# Patient Record
Sex: MALE | Race: WHITE | Employment: OTHER
[De-identification: names, ages, dates, MRNs, and addresses within clinical notes are randomized per-mention and may not be internally consistent; named-entity substitution may affect disease eponyms.]

---

## 2017-01-09 DIAGNOSIS — M51.26 LUMBAGO DUE TO DISPLACEMENT OF INTERVERTEBRAL DISC: ICD-10-CM

## 2017-01-09 RX ORDER — GABAPENTIN 300 MG/1
300 CAPSULE ORAL 3 TIMES DAILY PRN
Qty: 90 CAPSULE | Refills: 2 | OUTPATIENT
Start: 2017-01-09 | End: 2018-01-09

## 2017-01-23 LAB — HBA1C MFR BLD: 6 %

## 2017-02-13 ENCOUNTER — CARE COORDINATION (OUTPATIENT)
Dept: CARE COORDINATION | Facility: CLINIC | Age: 52
End: 2017-02-13

## 2017-02-13 DIAGNOSIS — M51.26 LUMBAGO DUE TO DISPLACEMENT OF INTERVERTEBRAL DISC: ICD-10-CM

## 2017-02-13 RX ORDER — MULTIVIT WITH MINERALS/LUTEIN
1000 TABLET ORAL DAILY
COMMUNITY
End: 2017-10-23 | Stop reason: DRUGHIGH

## 2017-02-13 RX ORDER — METRONIDAZOLE 500 MG/1
500 TABLET ORAL 3 TIMES DAILY
COMMUNITY
Start: 2017-02-10 | End: 2017-02-17

## 2017-02-13 RX ORDER — CEFUROXIME AXETIL 500 MG/1
500 TABLET ORAL 2 TIMES DAILY
COMMUNITY
Start: 2017-02-10 | End: 2017-02-24

## 2017-02-14 ENCOUNTER — TELEPHONE (OUTPATIENT)
Dept: FAMILY MEDICINE CLINIC | Facility: CLINIC | Age: 52
End: 2017-02-14

## 2017-02-14 RX ORDER — METHADONE HYDROCHLORIDE 10 MG/1
10 TABLET ORAL 2 TIMES DAILY
Qty: 60 TABLET | Refills: 0 | Status: SHIPPED | OUTPATIENT
Start: 2017-02-14 | End: 2017-03-07 | Stop reason: ALTCHOICE

## 2017-02-15 ENCOUNTER — HOSPITAL ENCOUNTER (OUTPATIENT)
Age: 52
Setting detail: SPECIMEN
Discharge: HOME | End: 2017-02-15
Payer: MEDICARE

## 2017-02-15 DIAGNOSIS — M51.26 LUMBAGO DUE TO DISPLACEMENT OF INTERVERTEBRAL DISC: ICD-10-CM

## 2017-02-15 LAB
ABSOLUTE EOS #: 0.3 K/UL (ref 0–0.4)
ABSOLUTE LYMPH #: 2.2 K/UL (ref 1–4.8)
ABSOLUTE MONO #: 1.2 K/UL (ref 0.1–1.2)
ALBUMIN SERPL-MCNC: 3.8 G/DL (ref 3.5–5.2)
ALBUMIN/GLOBULIN RATIO: 1.1 (ref 1–2.5)
ALP BLD-CCNC: 172 U/L (ref 40–129)
ALT SERPL-CCNC: 37 U/L (ref 5–41)
ANION GAP SERPL CALCULATED.3IONS-SCNC: 19 MMOL/L (ref 9–17)
AST SERPL-CCNC: 36 U/L
BASOPHILS # BLD: 1 % (ref 0–2)
BASOPHILS ABSOLUTE: 0.1 K/UL (ref 0–0.2)
BILIRUB SERPL-MCNC: 1.01 MG/DL (ref 0.3–1.2)
BUN BLDV-MCNC: 24 MG/DL (ref 6–20)
BUN/CREAT BLD: ABNORMAL (ref 9–20)
CALCIUM SERPL-MCNC: 9.5 MG/DL (ref 8.6–10.4)
CHLORIDE BLD-SCNC: 91 MMOL/L (ref 98–107)
CO2: 24 MMOL/L (ref 20–31)
CREAT SERPL-MCNC: 0.82 MG/DL (ref 0.7–1.2)
DIFFERENTIAL TYPE: ABNORMAL
EOSINOPHILS RELATIVE PERCENT: 2 % (ref 1–4)
GFR AFRICAN AMERICAN: >60 ML/MIN
GFR NON-AFRICAN AMERICAN: >60 ML/MIN
GFR SERPL CREATININE-BSD FRML MDRD: ABNORMAL ML/MIN/{1.73_M2}
GFR SERPL CREATININE-BSD FRML MDRD: ABNORMAL ML/MIN/{1.73_M2}
GLUCOSE BLD-MCNC: 104 MG/DL (ref 70–99)
HCT VFR BLD CALC: 33.3 % (ref 41–53)
HEMOGLOBIN: 10.9 G/DL (ref 13.5–17.5)
LYMPHOCYTES # BLD: 15 % (ref 24–44)
MCH RBC QN AUTO: 29.3 PG (ref 26–34)
MCHC RBC AUTO-ENTMCNC: 32.8 G/DL (ref 31–37)
MCV RBC AUTO: 89.4 FL (ref 80–100)
MONOCYTES # BLD: 8 % (ref 2–11)
PDW BLD-RTO: 17.1 % (ref 12.5–15.4)
PLATELET # BLD: 1267 K/UL (ref 140–450)
PLATELET ESTIMATE: ABNORMAL
PMV BLD AUTO: 7.5 FL (ref 6–12)
POTASSIUM SERPL-SCNC: 5.9 MMOL/L (ref 3.7–5.3)
RBC # BLD: 3.72 M/UL (ref 4.5–5.9)
RBC # BLD: ABNORMAL 10*6/UL
SEG NEUTROPHILS: 74 % (ref 36–66)
SEGMENTED NEUTROPHILS ABSOLUTE COUNT: 10.8 K/UL (ref 1.8–7.7)
SODIUM BLD-SCNC: 134 MMOL/L (ref 135–144)
TOTAL PROTEIN: 7.4 G/DL (ref 6.4–8.3)
WBC # BLD: 14.6 K/UL (ref 3.5–11)
WBC # BLD: ABNORMAL 10*3/UL

## 2017-02-15 PROCEDURE — 36415 COLL VENOUS BLD VENIPUNCTURE: CPT

## 2017-02-15 PROCEDURE — 80053 COMPREHEN METABOLIC PANEL: CPT

## 2017-02-15 PROCEDURE — 85025 COMPLETE CBC W/AUTO DIFF WBC: CPT

## 2017-02-16 DIAGNOSIS — M51.26 LUMBAGO DUE TO DISPLACEMENT OF INTERVERTEBRAL DISC: ICD-10-CM

## 2017-02-16 RX ORDER — METHADONE HYDROCHLORIDE 10 MG/1
TABLET ORAL
Qty: 60 TABLET | Refills: 0 | OUTPATIENT
Start: 2017-02-16

## 2017-02-16 RX ORDER — METHADONE HYDROCHLORIDE 10 MG/1
10 TABLET ORAL 2 TIMES DAILY
Qty: 60 TABLET | Refills: 0 | Status: CANCELLED | OUTPATIENT
Start: 2017-02-16 | End: 2018-02-16

## 2017-02-18 DIAGNOSIS — E78.5 HYPERLIPIDEMIA, UNSPECIFIED HYPERLIPIDEMIA TYPE: ICD-10-CM

## 2017-02-20 RX ORDER — ATORVASTATIN CALCIUM 40 MG/1
40 TABLET, FILM COATED ORAL DAILY
Qty: 30 TABLET | Refills: 2 | Status: SHIPPED | OUTPATIENT
Start: 2017-02-20 | End: 2017-05-08 | Stop reason: SDUPTHER

## 2017-02-27 ENCOUNTER — CARE COORDINATION (OUTPATIENT)
Dept: CARE COORDINATION | Facility: CLINIC | Age: 52
End: 2017-02-27

## 2017-02-27 RX ORDER — CEFUROXIME AXETIL 500 MG/1
500 TABLET ORAL 2 TIMES DAILY
COMMUNITY
Start: 2017-02-25 | End: 2017-03-07 | Stop reason: ALTCHOICE

## 2017-03-06 ENCOUNTER — CARE COORDINATION (OUTPATIENT)
Dept: CARE COORDINATION | Facility: CLINIC | Age: 52
End: 2017-03-06

## 2017-03-06 DIAGNOSIS — M51.26 LUMBAGO DUE TO DISPLACEMENT OF INTERVERTEBRAL DISC: ICD-10-CM

## 2017-03-06 RX ORDER — METHADONE HYDROCHLORIDE 10 MG/1
10 TABLET ORAL 2 TIMES DAILY
Qty: 60 TABLET | Refills: 0 | Status: CANCELLED | OUTPATIENT
Start: 2017-03-06 | End: 2018-03-06

## 2017-03-07 ENCOUNTER — OFFICE VISIT (OUTPATIENT)
Dept: FAMILY MEDICINE CLINIC | Facility: CLINIC | Age: 52
End: 2017-03-07

## 2017-03-07 VITALS
TEMPERATURE: 97.1 F | DIASTOLIC BLOOD PRESSURE: 72 MMHG | RESPIRATION RATE: 17 BRPM | BODY MASS INDEX: 28.62 KG/M2 | HEIGHT: 76 IN | HEART RATE: 70 BPM | WEIGHT: 235 LBS | SYSTOLIC BLOOD PRESSURE: 100 MMHG

## 2017-03-07 DIAGNOSIS — F11.20 UNCOMPLICATED OPIOID DEPENDENCE (HCC): ICD-10-CM

## 2017-03-07 DIAGNOSIS — I10 ESSENTIAL HYPERTENSION: ICD-10-CM

## 2017-03-07 DIAGNOSIS — R55 NEAR SYNCOPE: ICD-10-CM

## 2017-03-07 DIAGNOSIS — J90 LOCULATED PLEURAL EFFUSION: Primary | ICD-10-CM

## 2017-03-07 DIAGNOSIS — J86.9 EMPYEMA (HCC): ICD-10-CM

## 2017-03-07 DIAGNOSIS — M51.26 LUMBAGO DUE TO DISPLACEMENT OF INTERVERTEBRAL DISC: ICD-10-CM

## 2017-03-07 PROCEDURE — 99495 TRANSJ CARE MGMT MOD F2F 14D: CPT | Performed by: NURSE PRACTITIONER

## 2017-03-07 RX ORDER — CEFUROXIME AXETIL 500 MG/1
500 TABLET ORAL 2 TIMES DAILY
COMMUNITY
End: 2017-07-10 | Stop reason: ALTCHOICE

## 2017-03-07 ASSESSMENT — ENCOUNTER SYMPTOMS
VOMITING: 0
DIARRHEA: 0
BACK PAIN: 1
EYE PAIN: 0
EYE DISCHARGE: 0
ABDOMINAL PAIN: 0
TROUBLE SWALLOWING: 0
CONSTIPATION: 1
WHEEZING: 0
SHORTNESS OF BREATH: 1
NAUSEA: 0
BLOOD IN STOOL: 0
RHINORRHEA: 1
COUGH: 1
SORE THROAT: 0

## 2017-03-10 RX ORDER — OXYCODONE AND ACETAMINOPHEN 7.5; 325 MG/1; MG/1
1 TABLET ORAL EVERY 4 HOURS PRN
Qty: 40 TABLET | Refills: 0 | Status: SHIPPED | OUTPATIENT
Start: 2017-03-10 | End: 2017-03-17 | Stop reason: SDUPTHER

## 2017-03-17 DIAGNOSIS — M51.26 LUMBAGO DUE TO DISPLACEMENT OF INTERVERTEBRAL DISC: ICD-10-CM

## 2017-03-17 RX ORDER — OXYCODONE AND ACETAMINOPHEN 7.5; 325 MG/1; MG/1
1 TABLET ORAL EVERY 6 HOURS PRN
Qty: 120 TABLET | Refills: 0 | Status: SHIPPED | OUTPATIENT
Start: 2017-03-17 | End: 2017-07-10 | Stop reason: ALTCHOICE

## 2017-03-20 RX ORDER — IBUPROFEN 800 MG/1
800 TABLET ORAL EVERY 8 HOURS PRN
Qty: 270 TABLET | Refills: 2 | Status: SHIPPED | OUTPATIENT
Start: 2017-03-20 | End: 2017-10-20 | Stop reason: SDUPTHER

## 2017-03-22 DIAGNOSIS — M51.26 LUMBAGO DUE TO DISPLACEMENT OF INTERVERTEBRAL DISC: ICD-10-CM

## 2017-03-23 RX ORDER — GABAPENTIN 300 MG/1
300 CAPSULE ORAL 3 TIMES DAILY PRN
Qty: 90 CAPSULE | Refills: 3 | Status: SHIPPED | OUTPATIENT
Start: 2017-03-23 | End: 2017-07-10 | Stop reason: DRUGHIGH

## 2017-03-24 ENCOUNTER — CARE COORDINATION (OUTPATIENT)
Dept: CARE COORDINATION | Age: 52
End: 2017-03-24

## 2017-04-07 ENCOUNTER — OFFICE VISIT (OUTPATIENT)
Dept: FAMILY MEDICINE CLINIC | Age: 52
End: 2017-04-07
Payer: MEDICARE

## 2017-04-07 VITALS
TEMPERATURE: 98 F | SYSTOLIC BLOOD PRESSURE: 110 MMHG | WEIGHT: 246 LBS | DIASTOLIC BLOOD PRESSURE: 84 MMHG | BODY MASS INDEX: 29.96 KG/M2 | HEIGHT: 76 IN | RESPIRATION RATE: 20 BRPM | HEART RATE: 72 BPM

## 2017-04-07 DIAGNOSIS — M51.26 LUMBAGO DUE TO DISPLACEMENT OF INTERVERTEBRAL DISC: Primary | ICD-10-CM

## 2017-04-07 DIAGNOSIS — M51.26 LUMBAGO DUE TO DISPLACEMENT OF INTERVERTEBRAL DISC: ICD-10-CM

## 2017-04-07 DIAGNOSIS — F11.20 UNCOMPLICATED OPIOID DEPENDENCE (HCC): ICD-10-CM

## 2017-04-07 DIAGNOSIS — F33.0 MAJOR DEPRESSIVE DISORDER, RECURRENT EPISODE, MILD (HCC): ICD-10-CM

## 2017-04-07 PROCEDURE — 99213 OFFICE O/P EST LOW 20 MIN: CPT | Performed by: PEDIATRICS

## 2017-04-07 PROCEDURE — 1036F TOBACCO NON-USER: CPT | Performed by: PEDIATRICS

## 2017-04-07 PROCEDURE — G8419 CALC BMI OUT NRM PARAM NOF/U: HCPCS | Performed by: PEDIATRICS

## 2017-04-07 PROCEDURE — G8427 DOCREV CUR MEDS BY ELIG CLIN: HCPCS | Performed by: PEDIATRICS

## 2017-04-07 PROCEDURE — 3017F COLORECTAL CA SCREEN DOC REV: CPT | Performed by: PEDIATRICS

## 2017-04-07 RX ORDER — LIDOCAINE 50 MG/G
PATCH TOPICAL
Qty: 90 PATCH | Refills: 0 | Status: SHIPPED | OUTPATIENT
Start: 2017-04-07 | End: 2017-05-07

## 2017-04-07 RX ORDER — GABAPENTIN 800 MG/1
800 TABLET ORAL DAILY
Qty: 90 TABLET | Refills: 3 | Status: SHIPPED | OUTPATIENT
Start: 2017-04-07 | End: 2017-06-14 | Stop reason: SDUPTHER

## 2017-04-07 ASSESSMENT — ENCOUNTER SYMPTOMS
BACK PAIN: 1
COUGH: 0

## 2017-04-11 ENCOUNTER — CARE COORDINATION (OUTPATIENT)
Dept: CARE COORDINATION | Age: 52
End: 2017-04-11

## 2017-04-26 RX ORDER — LIDOCAINE 50 MG/G
OINTMENT TOPICAL
Qty: 150 G | Refills: 2 | Status: SHIPPED | OUTPATIENT
Start: 2017-04-26 | End: 2018-04-26

## 2017-05-02 DIAGNOSIS — M51.26 LUMBAGO DUE TO DISPLACEMENT OF INTERVERTEBRAL DISC: ICD-10-CM

## 2017-05-06 ENCOUNTER — PATIENT MESSAGE (OUTPATIENT)
Dept: FAMILY MEDICINE CLINIC | Age: 52
End: 2017-05-06

## 2017-05-08 DIAGNOSIS — E78.5 HYPERLIPIDEMIA, UNSPECIFIED HYPERLIPIDEMIA TYPE: ICD-10-CM

## 2017-05-09 RX ORDER — ATORVASTATIN CALCIUM 40 MG/1
40 TABLET, FILM COATED ORAL DAILY
Qty: 30 TABLET | Refills: 2 | Status: SHIPPED | OUTPATIENT
Start: 2017-05-09 | End: 2017-07-18 | Stop reason: SDUPTHER

## 2017-05-16 ENCOUNTER — CARE COORDINATION (OUTPATIENT)
Dept: CARE COORDINATION | Age: 52
End: 2017-05-16

## 2017-05-19 DIAGNOSIS — I10 ESSENTIAL HYPERTENSION: ICD-10-CM

## 2017-05-19 RX ORDER — HYDROCHLOROTHIAZIDE 25 MG/1
25 TABLET ORAL DAILY
Qty: 30 TABLET | Refills: 5 | Status: SHIPPED | OUTPATIENT
Start: 2017-05-19 | End: 2017-11-15 | Stop reason: SDUPTHER

## 2017-05-30 ENCOUNTER — CARE COORDINATION (OUTPATIENT)
Dept: CARE COORDINATION | Age: 52
End: 2017-05-30

## 2017-06-06 DIAGNOSIS — M51.26 LUMBAGO DUE TO DISPLACEMENT OF INTERVERTEBRAL DISC: ICD-10-CM

## 2017-06-14 DIAGNOSIS — M51.26 LUMBAGO DUE TO DISPLACEMENT OF INTERVERTEBRAL DISC: ICD-10-CM

## 2017-06-14 RX ORDER — GABAPENTIN 800 MG/1
800 TABLET ORAL 3 TIMES DAILY
Qty: 90 TABLET | Refills: 3 | Status: SHIPPED | OUTPATIENT
Start: 2017-06-14 | End: 2017-10-20 | Stop reason: SDUPTHER

## 2017-06-18 DIAGNOSIS — F33.0 MAJOR DEPRESSIVE DISORDER, RECURRENT EPISODE, MILD (HCC): ICD-10-CM

## 2017-06-18 DIAGNOSIS — M51.26 LUMBAGO DUE TO DISPLACEMENT OF INTERVERTEBRAL DISC: ICD-10-CM

## 2017-06-19 RX ORDER — DULOXETIN HYDROCHLORIDE 60 MG/1
60 CAPSULE, DELAYED RELEASE ORAL DAILY
Qty: 30 CAPSULE | Refills: 5 | Status: SHIPPED | OUTPATIENT
Start: 2017-06-19 | End: 2017-11-17 | Stop reason: SDUPTHER

## 2017-06-20 ENCOUNTER — CARE COORDINATION (OUTPATIENT)
Dept: CARE COORDINATION | Age: 52
End: 2017-06-20

## 2017-07-05 DIAGNOSIS — M51.26 LUMBAGO DUE TO DISPLACEMENT OF INTERVERTEBRAL DISC: ICD-10-CM

## 2017-07-10 ENCOUNTER — OFFICE VISIT (OUTPATIENT)
Dept: FAMILY MEDICINE CLINIC | Age: 52
End: 2017-07-10
Payer: MEDICARE

## 2017-07-10 VITALS
DIASTOLIC BLOOD PRESSURE: 78 MMHG | HEART RATE: 88 BPM | TEMPERATURE: 98.1 F | SYSTOLIC BLOOD PRESSURE: 126 MMHG | BODY MASS INDEX: 32.28 KG/M2 | WEIGHT: 265.2 LBS | RESPIRATION RATE: 20 BRPM

## 2017-07-10 DIAGNOSIS — R73.01 IMPAIRED FASTING BLOOD SUGAR: ICD-10-CM

## 2017-07-10 DIAGNOSIS — E78.00 PURE HYPERCHOLESTEROLEMIA: ICD-10-CM

## 2017-07-10 DIAGNOSIS — I10 ESSENTIAL HYPERTENSION: ICD-10-CM

## 2017-07-10 DIAGNOSIS — Z12.11 COLON CANCER SCREENING: ICD-10-CM

## 2017-07-10 DIAGNOSIS — D64.9 ANEMIA, UNSPECIFIED TYPE: ICD-10-CM

## 2017-07-10 DIAGNOSIS — M51.26 LUMBAGO DUE TO DISPLACEMENT OF INTERVERTEBRAL DISC: Primary | ICD-10-CM

## 2017-07-10 PROCEDURE — 99214 OFFICE O/P EST MOD 30 MIN: CPT | Performed by: PEDIATRICS

## 2017-07-10 PROCEDURE — 3017F COLORECTAL CA SCREEN DOC REV: CPT | Performed by: PEDIATRICS

## 2017-07-10 PROCEDURE — G8427 DOCREV CUR MEDS BY ELIG CLIN: HCPCS | Performed by: PEDIATRICS

## 2017-07-10 PROCEDURE — G8417 CALC BMI ABV UP PARAM F/U: HCPCS | Performed by: PEDIATRICS

## 2017-07-10 PROCEDURE — 1036F TOBACCO NON-USER: CPT | Performed by: PEDIATRICS

## 2017-07-10 RX ORDER — MOMETASONE FUROATE 50 UG/1
2 SPRAY, METERED NASAL DAILY
Refills: 0 | COMMUNITY
Start: 2017-05-02 | End: 2017-10-23 | Stop reason: SDUPTHER

## 2017-07-10 ASSESSMENT — PATIENT HEALTH QUESTIONNAIRE - PHQ9
2. FEELING DOWN, DEPRESSED OR HOPELESS: 0
SUM OF ALL RESPONSES TO PHQ QUESTIONS 1-9: 1
SUM OF ALL RESPONSES TO PHQ9 QUESTIONS 1 & 2: 1
1. LITTLE INTEREST OR PLEASURE IN DOING THINGS: 1

## 2017-07-10 ASSESSMENT — ENCOUNTER SYMPTOMS
BOWEL INCONTINENCE: 0
BACK PAIN: 1
ABDOMINAL PAIN: 0

## 2017-07-18 DIAGNOSIS — E78.5 HYPERLIPIDEMIA, UNSPECIFIED HYPERLIPIDEMIA TYPE: ICD-10-CM

## 2017-07-18 DIAGNOSIS — I10 ESSENTIAL HYPERTENSION: ICD-10-CM

## 2017-07-18 RX ORDER — LISINOPRIL 20 MG/1
20 TABLET ORAL DAILY
Qty: 30 TABLET | Refills: 5 | Status: SHIPPED | OUTPATIENT
Start: 2017-07-18 | End: 2017-11-17 | Stop reason: SDUPTHER

## 2017-07-18 RX ORDER — ATORVASTATIN CALCIUM 40 MG/1
40 TABLET, FILM COATED ORAL DAILY
Qty: 30 TABLET | Refills: 3 | Status: SHIPPED | OUTPATIENT
Start: 2017-07-18 | End: 2017-07-26 | Stop reason: SDUPTHER

## 2017-07-20 ENCOUNTER — CARE COORDINATION (OUTPATIENT)
Dept: CARE COORDINATION | Age: 52
End: 2017-07-20

## 2017-07-20 DIAGNOSIS — M51.26 LUMBAGO DUE TO DISPLACEMENT OF INTERVERTEBRAL DISC: Primary | ICD-10-CM

## 2017-07-21 ENCOUNTER — CARE COORDINATION (OUTPATIENT)
Dept: CARE COORDINATION | Age: 52
End: 2017-07-21

## 2017-07-24 ENCOUNTER — HOSPITAL ENCOUNTER (OUTPATIENT)
Age: 52
Setting detail: SPECIMEN
Discharge: HOME | End: 2017-07-24
Payer: MEDICARE

## 2017-07-24 DIAGNOSIS — I10 ESSENTIAL HYPERTENSION: ICD-10-CM

## 2017-07-24 DIAGNOSIS — D64.9 ANEMIA, UNSPECIFIED TYPE: ICD-10-CM

## 2017-07-24 DIAGNOSIS — R73.01 IMPAIRED FASTING BLOOD SUGAR: ICD-10-CM

## 2017-07-24 DIAGNOSIS — E78.00 PURE HYPERCHOLESTEROLEMIA: ICD-10-CM

## 2017-07-24 LAB
ALBUMIN SERPL-MCNC: 4.7 G/DL (ref 3.5–5.2)
ALBUMIN/GLOBULIN RATIO: 1.7 (ref 1–2.5)
ALP BLD-CCNC: 123 U/L (ref 40–129)
ALT SERPL-CCNC: 15 U/L (ref 5–41)
ANION GAP SERPL CALCULATED.3IONS-SCNC: 18 MMOL/L (ref 9–17)
AST SERPL-CCNC: 17 U/L
BILIRUB SERPL-MCNC: 0.7 MG/DL (ref 0.3–1.2)
BUN BLDV-MCNC: 20 MG/DL (ref 6–20)
BUN/CREAT BLD: ABNORMAL (ref 9–20)
CALCIUM SERPL-MCNC: 9.7 MG/DL (ref 8.6–10.4)
CHLORIDE BLD-SCNC: 94 MMOL/L (ref 98–107)
CHOLESTEROL/HDL RATIO: 4
CHOLESTEROL: 227 MG/DL
CO2: 27 MMOL/L (ref 20–31)
CREAT SERPL-MCNC: 0.85 MG/DL (ref 0.7–1.2)
ESTIMATED AVERAGE GLUCOSE: 131 MG/DL
GFR AFRICAN AMERICAN: >60 ML/MIN
GFR NON-AFRICAN AMERICAN: >60 ML/MIN
GFR SERPL CREATININE-BSD FRML MDRD: ABNORMAL ML/MIN/{1.73_M2}
GFR SERPL CREATININE-BSD FRML MDRD: ABNORMAL ML/MIN/{1.73_M2}
GLUCOSE BLD-MCNC: 101 MG/DL (ref 70–99)
HBA1C MFR BLD: 6.2 % (ref 4–6)
HCT VFR BLD CALC: 39.1 % (ref 41–53)
HDLC SERPL-MCNC: 57 MG/DL
HEMOGLOBIN: 12.9 G/DL (ref 13.5–17.5)
LDL CHOLESTEROL DIRECT: 133 MG/DL
LDL CHOLESTEROL: ABNORMAL MG/DL (ref 0–130)
MCH RBC QN AUTO: 30.1 PG (ref 26–34)
MCHC RBC AUTO-ENTMCNC: 33.1 G/DL (ref 31–37)
MCV RBC AUTO: 91 FL (ref 80–100)
PDW BLD-RTO: 14.9 % (ref 12.5–15.4)
PLATELET # BLD: 357 K/UL (ref 140–450)
PMV BLD AUTO: 8.3 FL (ref 6–12)
POTASSIUM SERPL-SCNC: 4.9 MMOL/L (ref 3.7–5.3)
RBC # BLD: 4.3 M/UL (ref 4.5–5.9)
SODIUM BLD-SCNC: 139 MMOL/L (ref 135–144)
TOTAL PROTEIN: 7.4 G/DL (ref 6.4–8.3)
TRIGL SERPL-MCNC: 454 MG/DL
VLDLC SERPL CALC-MCNC: ABNORMAL MG/DL (ref 1–30)
WBC # BLD: 8.6 K/UL (ref 3.5–11)

## 2017-07-25 ENCOUNTER — CARE COORDINATION (OUTPATIENT)
Dept: CARE COORDINATION | Age: 52
End: 2017-07-25

## 2017-07-26 DIAGNOSIS — M51.26 LUMBAGO DUE TO DISPLACEMENT OF INTERVERTEBRAL DISC: ICD-10-CM

## 2017-07-26 DIAGNOSIS — E78.5 HYPERLIPIDEMIA, UNSPECIFIED HYPERLIPIDEMIA TYPE: ICD-10-CM

## 2017-07-26 RX ORDER — ATORVASTATIN CALCIUM 80 MG/1
80 TABLET, FILM COATED ORAL DAILY
Qty: 30 TABLET | Refills: 3 | Status: SHIPPED | OUTPATIENT
Start: 2017-07-26 | End: 2017-10-20 | Stop reason: SDUPTHER

## 2017-08-01 ENCOUNTER — CARE COORDINATION (OUTPATIENT)
Dept: CARE COORDINATION | Age: 52
End: 2017-08-01

## 2017-08-07 DIAGNOSIS — M51.26 LUMBAGO DUE TO DISPLACEMENT OF INTERVERTEBRAL DISC: ICD-10-CM

## 2017-08-15 ENCOUNTER — CARE COORDINATION (OUTPATIENT)
Dept: CARE COORDINATION | Age: 52
End: 2017-08-15

## 2017-09-05 DIAGNOSIS — M51.26 LUMBAGO DUE TO DISPLACEMENT OF INTERVERTEBRAL DISC: ICD-10-CM

## 2017-09-15 ENCOUNTER — CARE COORDINATION (OUTPATIENT)
Dept: CARE COORDINATION | Age: 52
End: 2017-09-15

## 2017-09-15 DIAGNOSIS — M51.26 LUMBAGO DUE TO DISPLACEMENT OF INTERVERTEBRAL DISC: ICD-10-CM

## 2017-09-15 RX ORDER — GABAPENTIN 800 MG/1
800 TABLET ORAL 3 TIMES DAILY
Qty: 90 TABLET | Refills: 3 | OUTPATIENT
Start: 2017-09-15 | End: 2018-09-15

## 2017-09-22 DIAGNOSIS — Z12.11 COLON CANCER SCREENING: ICD-10-CM

## 2017-09-22 LAB
CONTROL: PRESENT
HEMOCCULT STL QL: POSITIVE

## 2017-09-22 PROCEDURE — 82274 ASSAY TEST FOR BLOOD FECAL: CPT | Performed by: PEDIATRICS

## 2017-09-26 ENCOUNTER — TELEPHONE (OUTPATIENT)
Dept: FAMILY MEDICINE CLINIC | Age: 52
End: 2017-09-26

## 2017-09-26 DIAGNOSIS — R19.5 POSITIVE FIT (FECAL IMMUNOCHEMICAL TEST): Primary | ICD-10-CM

## 2017-09-29 ENCOUNTER — CARE COORDINATION (OUTPATIENT)
Dept: CARE COORDINATION | Age: 52
End: 2017-09-29

## 2017-10-06 DIAGNOSIS — M51.26 LUMBAGO DUE TO DISPLACEMENT OF INTERVERTEBRAL DISC: ICD-10-CM

## 2017-10-06 NOTE — TELEPHONE ENCOUNTER
LOV 7-10-17  LR 9-6-17  OARRS reviewed 9-6-17 by     Health Maintenance   Topic Date Due    Flu vaccine (1) 09/01/2017    Colon Cancer Screen FIT/FOBT  09/22/2018    Diabetes screen  07/24/2020    Lipid screen  07/24/2022    DTaP/Tdap/Td vaccine (2 - Td) 07/12/2026    Hepatitis C screen  Completed    HIV screen  Completed             (applicable per patient's age: Cancer Screenings, Depression Screening, Fall Risk Screening, Immunizations)    Hemoglobin A1C (%)   Date Value   07/24/2017 6.2 (H)   01/23/2017 6     LDL Cholesterol (mg/dL)   Date Value   07/24/2017          AST (U/L)   Date Value   07/24/2017 17     ALT (U/L)   Date Value   07/24/2017 15     BUN (mg/dL)   Date Value   07/24/2017 20      (goal A1C is < 7)   (goal LDL is <100) need 30-50% reduction from baseline     BP Readings from Last 3 Encounters:   07/10/17 126/78   04/07/17 110/84   03/07/17 100/72    (goal /80)      All Future Testing planned in [facility]:      Next Visit Date:  Future Appointments  Date Time Provider Donovan Floriani   10/9/2017 1:30 PM Iesha Saldana MD [facility]   10/23/2017 10:00 AM ARYAN Vines [facility]   11/14/2017 10:20 AM MD [facility]            Patient Active Problem List:     Hyperlipidemia     Hypertension     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

## 2017-10-06 NOTE — TELEPHONE ENCOUNTER
From: Rosangela Husain  To: Rambo Melchor MD  Sent: 10/6/2017 6:42 AM EDT  Subject: Medication Renewal Request    Original authorizing provider: MD Mohsen Santoros Mahendra would like a refill of the following medications:  HYDROmorphone (EXALGO) 12 MG T24A extended release tablet Rambo Melchor MD]    Preferred pharmacy: [address] [place] [address] [phone] - F [phone]    Comment:

## 2017-10-09 ENCOUNTER — OFFICE VISIT (OUTPATIENT)
Dept: GASTROENTEROLOGY | Age: 52
End: 2017-10-09
Payer: MEDICARE

## 2017-10-09 VITALS
WEIGHT: 276 LBS | TEMPERATURE: 97.3 F | HEART RATE: 94 BPM | RESPIRATION RATE: 14 BRPM | HEIGHT: 76 IN | SYSTOLIC BLOOD PRESSURE: 130 MMHG | BODY MASS INDEX: 33.61 KG/M2 | DIASTOLIC BLOOD PRESSURE: 81 MMHG | OXYGEN SATURATION: 99 %

## 2017-10-09 DIAGNOSIS — R13.10 DYSPHAGIA, UNSPECIFIED TYPE: ICD-10-CM

## 2017-10-09 DIAGNOSIS — R19.5 POSITIVE FIT (FECAL IMMUNOCHEMICAL TEST): Primary | ICD-10-CM

## 2017-10-09 DIAGNOSIS — D64.9 ANEMIA, UNSPECIFIED: ICD-10-CM

## 2017-10-09 PROCEDURE — 1036F TOBACCO NON-USER: CPT | Performed by: INTERNAL MEDICINE

## 2017-10-09 PROCEDURE — G8427 DOCREV CUR MEDS BY ELIG CLIN: HCPCS | Performed by: INTERNAL MEDICINE

## 2017-10-09 PROCEDURE — 3017F COLORECTAL CA SCREEN DOC REV: CPT | Performed by: INTERNAL MEDICINE

## 2017-10-09 PROCEDURE — G8482 FLU IMMUNIZE ORDER/ADMIN: HCPCS | Performed by: INTERNAL MEDICINE

## 2017-10-09 PROCEDURE — 99204 OFFICE O/P NEW MOD 45 MIN: CPT | Performed by: INTERNAL MEDICINE

## 2017-10-09 PROCEDURE — G8417 CALC BMI ABV UP PARAM F/U: HCPCS | Performed by: INTERNAL MEDICINE

## 2017-10-09 RX ORDER — SODIUM, POTASSIUM,MAG SULFATES 17.5-3.13G
SOLUTION, RECONSTITUTED, ORAL ORAL
Qty: 1 BOTTLE | Refills: 0 | Status: SHIPPED | OUTPATIENT
Start: 2017-10-09 | End: 2018-02-20 | Stop reason: ALTCHOICE

## 2017-10-09 ASSESSMENT — ENCOUNTER SYMPTOMS
ALLERGIC/IMMUNOLOGIC NEGATIVE: 1
VOMITING: 0
NAUSEA: 0
COUGH: 1
ABDOMINAL PAIN: 0
ANAL BLEEDING: 0
BLOOD IN STOOL: 1
DIARRHEA: 0
CONSTIPATION: 0
ABDOMINAL DISTENTION: 0
TROUBLE SWALLOWING: 1
RECTAL PAIN: 0
EYES NEGATIVE: 1

## 2017-10-09 NOTE — PROGRESS NOTES
Subjective:      Patient ID: Fausto Calero is a 46 y.o. male. HPI  Dr. Wilfrid Lopes MD has requested that I see Fausto Calero for a consult for   1. Positive FIT (fecal immunochemical test)    2. Dysphagia, unspecified type    3. Anemia, unspecified     . Patient is here for the first time. GI Problem (pos FIT test ) and Dysphagia (occasional food gets stuck )  Patient is here for the 1st time, referred because of positive fit, but also he said he says he was intubated because of some respiratory issues is been having problem with food getting stuck in his throat, he denied any choking denied any pharyngeal dysphagia type symptoms. But he think that the food around and gets stuck and he has to bring stuff to make go down. He did have reflux although the symptoms at this time are not flaring up on him. He denied any weight loss denied any bleeding. Is anemic and when his stool was checked and was positive for fit and  He's never had a colonoscopy  Denied any lower GI symptoms    Past Medical, Family, and Social History reviewed and does contribute to the patient presenting condition. patient\"s PMH/PSH,SH,PSYCH hx, MEDs, ALLERGIES, and ROS was all reviewed and updated ion the appropriate sections    Review of Systems   Constitutional: Positive for fatigue. HENT: Positive for trouble swallowing (occasional food gets stuck). Eyes: Negative. Respiratory: Positive for cough. Cardiovascular: Negative. Gastrointestinal: Positive for blood in stool (pos fit test). Negative for abdominal distention, abdominal pain, anal bleeding, constipation, diarrhea, nausea, rectal pain and vomiting. Endocrine: Negative. Genitourinary: Negative. Musculoskeletal: Negative. Skin: Negative. Allergic/Immunologic: Negative. Neurological: Positive for weakness (rt leg). Hematological: Negative. Psychiatric/Behavioral: Negative.         Objective:   Physical Exam   Constitutional: He is oriented to

## 2017-10-11 ENCOUNTER — HOSPITAL ENCOUNTER (OUTPATIENT)
Facility: CLINIC | Age: 52
Setting detail: OUTPATIENT SURGERY
Discharge: HOME | End: 2017-10-11
Attending: INTERNAL MEDICINE | Admitting: INTERNAL MEDICINE
Payer: MEDICARE

## 2017-10-11 ENCOUNTER — HOSPITAL ENCOUNTER (OUTPATIENT)
Age: 52
Setting detail: SPECIMEN
Discharge: HOME | End: 2017-10-11
Payer: MEDICARE

## 2017-10-11 ENCOUNTER — ANESTHESIA EVENT (OUTPATIENT)
Dept: OPERATING ROOM | Facility: CLINIC | Age: 52
End: 2017-10-11
Payer: MEDICARE

## 2017-10-11 ENCOUNTER — ANESTHESIA (OUTPATIENT)
Dept: OPERATING ROOM | Facility: CLINIC | Age: 52
End: 2017-10-11
Payer: MEDICARE

## 2017-10-11 VITALS
TEMPERATURE: 96.8 F | HEART RATE: 82 BPM | HEIGHT: 76 IN | BODY MASS INDEX: 32.88 KG/M2 | SYSTOLIC BLOOD PRESSURE: 110 MMHG | RESPIRATION RATE: 9 BRPM | WEIGHT: 270 LBS | OXYGEN SATURATION: 98 % | DIASTOLIC BLOOD PRESSURE: 75 MMHG

## 2017-10-11 VITALS — OXYGEN SATURATION: 97 % | DIASTOLIC BLOOD PRESSURE: 73 MMHG | SYSTOLIC BLOOD PRESSURE: 115 MMHG

## 2017-10-11 LAB
IRON SATURATION: 20 % (ref 20–55)
IRON: 81 UG/DL (ref 59–158)
TOTAL IRON BINDING CAPACITY: 400 UG/DL (ref 250–450)
UNSATURATED IRON BINDING CAPACITY: 319 UG/DL (ref 112–347)

## 2017-10-11 PROCEDURE — 43239 EGD BIOPSY SINGLE/MULTIPLE: CPT | Performed by: INTERNAL MEDICINE

## 2017-10-11 PROCEDURE — 2580000003 HC RX 258: Performed by: ANESTHESIOLOGY

## 2017-10-11 PROCEDURE — 7100000010 HC PHASE II RECOVERY - FIRST 15 MIN: Performed by: INTERNAL MEDICINE

## 2017-10-11 PROCEDURE — 3609010300 HC COLONOSCOPY W/BIOPSY SINGLE/MULTIPLE: Performed by: INTERNAL MEDICINE

## 2017-10-11 PROCEDURE — 7100000000 HC PACU RECOVERY - FIRST 15 MIN: Performed by: INTERNAL MEDICINE

## 2017-10-11 PROCEDURE — 7100000001 HC PACU RECOVERY - ADDTL 15 MIN: Performed by: INTERNAL MEDICINE

## 2017-10-11 PROCEDURE — 7100000011 HC PHASE II RECOVERY - ADDTL 15 MIN: Performed by: INTERNAL MEDICINE

## 2017-10-11 PROCEDURE — 88305 TISSUE EXAM BY PATHOLOGIST: CPT

## 2017-10-11 PROCEDURE — 3700000000 HC ANESTHESIA ATTENDED CARE: Performed by: INTERNAL MEDICINE

## 2017-10-11 PROCEDURE — 3609012400 HC EGD TRANSORAL BIOPSY SINGLE/MULTIPLE: Performed by: INTERNAL MEDICINE

## 2017-10-11 PROCEDURE — 2500000003 HC RX 250 WO HCPCS: Performed by: NURSE ANESTHETIST, CERTIFIED REGISTERED

## 2017-10-11 PROCEDURE — 45380 COLONOSCOPY AND BIOPSY: CPT | Performed by: INTERNAL MEDICINE

## 2017-10-11 PROCEDURE — 3700000001 HC ADD 15 MINUTES (ANESTHESIA): Performed by: INTERNAL MEDICINE

## 2017-10-11 PROCEDURE — 6360000002 HC RX W HCPCS: Performed by: NURSE ANESTHETIST, CERTIFIED REGISTERED

## 2017-10-11 RX ORDER — LIDOCAINE HYDROCHLORIDE 10 MG/ML
INJECTION, SOLUTION INFILTRATION; PERINEURAL PRN
Status: DISCONTINUED | OUTPATIENT
Start: 2017-10-11 | End: 2017-10-11 | Stop reason: SDUPTHER

## 2017-10-11 RX ORDER — PROPOFOL 10 MG/ML
INJECTION, EMULSION INTRAVENOUS PRN
Status: DISCONTINUED | OUTPATIENT
Start: 2017-10-11 | End: 2017-10-11 | Stop reason: SDUPTHER

## 2017-10-11 RX ORDER — SODIUM CHLORIDE, SODIUM LACTATE, POTASSIUM CHLORIDE, CALCIUM CHLORIDE 600; 310; 30; 20 MG/100ML; MG/100ML; MG/100ML; MG/100ML
INJECTION, SOLUTION INTRAVENOUS CONTINUOUS
Status: DISCONTINUED | OUTPATIENT
Start: 2017-10-11 | End: 2017-10-11 | Stop reason: HOSPADM

## 2017-10-11 RX ADMIN — PROPOFOL 50 MG: 10 INJECTION, EMULSION INTRAVENOUS at 12:26

## 2017-10-11 RX ADMIN — PROPOFOL 50 MG: 10 INJECTION, EMULSION INTRAVENOUS at 12:03

## 2017-10-11 RX ADMIN — PROPOFOL 50 MG: 10 INJECTION, EMULSION INTRAVENOUS at 12:23

## 2017-10-11 RX ADMIN — PROPOFOL 50 MG: 10 INJECTION, EMULSION INTRAVENOUS at 12:20

## 2017-10-11 RX ADMIN — PROPOFOL 50 MG: 10 INJECTION, EMULSION INTRAVENOUS at 12:01

## 2017-10-11 RX ADMIN — PROPOFOL 50 MG: 10 INJECTION, EMULSION INTRAVENOUS at 12:05

## 2017-10-11 RX ADMIN — SODIUM CHLORIDE, POTASSIUM CHLORIDE, SODIUM LACTATE AND CALCIUM CHLORIDE: 600; 310; 30; 20 INJECTION, SOLUTION INTRAVENOUS at 12:28

## 2017-10-11 RX ADMIN — SODIUM CHLORIDE, POTASSIUM CHLORIDE, SODIUM LACTATE AND CALCIUM CHLORIDE: 600; 310; 30; 20 INJECTION, SOLUTION INTRAVENOUS at 11:21

## 2017-10-11 RX ADMIN — PROPOFOL 50 MG: 10 INJECTION, EMULSION INTRAVENOUS at 12:14

## 2017-10-11 RX ADMIN — PROPOFOL 50 MG: 10 INJECTION, EMULSION INTRAVENOUS at 12:11

## 2017-10-11 RX ADMIN — LIDOCAINE HYDROCHLORIDE 50 MG: 10 INJECTION, SOLUTION INFILTRATION; PERINEURAL at 11:55

## 2017-10-11 RX ADMIN — PROPOFOL 50 MG: 10 INJECTION, EMULSION INTRAVENOUS at 12:09

## 2017-10-11 RX ADMIN — PROPOFOL 50 MG: 10 INJECTION, EMULSION INTRAVENOUS at 11:57

## 2017-10-11 RX ADMIN — PROPOFOL 50 MG: 10 INJECTION, EMULSION INTRAVENOUS at 11:59

## 2017-10-11 RX ADMIN — PROPOFOL 50 MG: 10 INJECTION, EMULSION INTRAVENOUS at 12:07

## 2017-10-11 RX ADMIN — PROPOFOL 100 MG: 10 INJECTION, EMULSION INTRAVENOUS at 11:55

## 2017-10-11 RX ADMIN — PROPOFOL 50 MG: 10 INJECTION, EMULSION INTRAVENOUS at 12:17

## 2017-10-11 ASSESSMENT — PAIN DESCRIPTION - DESCRIPTORS: DESCRIPTORS: ACHING

## 2017-10-11 ASSESSMENT — ENCOUNTER SYMPTOMS: SHORTNESS OF BREATH: 0

## 2017-10-11 ASSESSMENT — LIFESTYLE VARIABLES: SMOKING_STATUS: 0

## 2017-10-11 ASSESSMENT — PAIN - FUNCTIONAL ASSESSMENT: PAIN_FUNCTIONAL_ASSESSMENT: 0-10

## 2017-10-11 NOTE — OP NOTE
PROCEDURE NOTE    DATE OF PROCEDURE: 10/11/2017    SURGEON: Gonzalo Green MD  Facility : [facility]  ASSISTANT: None  Anesthesia: MAC  PREOPERATIVE DIAGNOSIS:   anmeia    POSTOPERATIVE DIAGNOSIS: as described below    OPERATION: Total colonoscopy     ANESTHESIA: Moderate Sedation    ESTIMATED BLOOD LOSS: less than 50     COMPLICATIONS: None. SPECIMENS:  Was Obtained:   Tiny sessile polyp 6 mm, cold bx rt colon . HISTORY: The patient is a 46y.o. year old male with history of above preop diagnosis. I recommended colonoscopy with possible biopsy or polypectomy and I explained the risk, benefits, expected outcome, and alternatives to the procedure. Risks included but are not limited to bleeding, infection, respiratory distress, hypotension, and perforation of the colon and possibility of missing a lesion. The patient understands and is in agreement. PROCEDURE: The patient was given IV conscious sedation. The patient's SPO2 remained above 90% throughout the procedure. The colonoscope was inserted per rectum and advanced under direct vision to the cecum without difficulty. The prep was good. Findings:  Terminal ileum: normal    Cecum/Ascending colon: abnormal: Tiny sessile polyp 6 mm, cold bx rt colon . few diverticula     Transverse colon: abnormal: few diverticula     Descending/Sigmoid colon: abnormal: few diverticula     Rectum/Anus: examined in normal and retroflexed positions and was abnormal:  Internal hemorrhoids     Withdrawal Time was (minutes): 10    The colon was decompressed and the scope was removed. The patient tolerated the procedure well. Recommendations/Plan:   1. Lifestyle and dietary modifications as discussed  2. F/U Biopsies  3. F/U In OfficeYes  4. Discussed with the family  5.  Repeat colonoscopy [id number]    Electronically signed by Gonzalo Green MD  on 10/11/2017 at 12:34 PM

## 2017-10-11 NOTE — H&P
Procedure History and Physical    Pre-Procedural Diagnosis:      1. Positive FIT (fecal immunochemical test)    2. Dysphagia, unspecified type    3. Anemia, unspecified     . Indications:  same    Procedure Planned: endoscopy and colonoscopy     History Obtained From:  patient    HISTORY OF PRESENT ILLNESS:       The patient is a 46 y.o. male who presents for the above procedure. Past Medical History:    Past Medical History:   Diagnosis Date    Arthritis     Dizziness     Hyperlipidemia     Hypertension     Lumbar disc herniation with radiculopathy     Nervously anxious     Osteoarthrosis of hip     Umbilical hernia        Past Surgical History:    Past Surgical History:   Procedure Laterality Date    CERVICAL FUSION  2011    LUMBAR FUSION  2009    LUMBAR LAMINECTOMY  2001    TOTAL HIP ARTHROPLASTY  2006    right    TOTAL HIP ARTHROPLASTY  2005    left       Medications:  No current facility-administered medications for this encounter. Allergies:   No Known Allergies            Problems with Sedation/Anesthesia in the past? no    REVIEW OF SYSTEMS:  12 point review of systems negative other than mentioned above.       PHYSICAL EXAM:    Vitals:  BP (!) 164/91   Pulse 100   Temp 97.7 °F (36.5 °C)   Resp 16   Ht 6' 4\" (1.93 m)   Wt 270 lb (122.5 kg)   SpO2 95%   BMI 32.87 kg/m²     Focused Exam related to procedure:    General appearance: NAD, conversant   Eyes: anicteric sclerae, moist conjunctivae; no lid-lag; PERRLA   Lungs: CTA, with normal respiratory effort and no intercostal retractions   CV: RRR, no MRGs   Abdomen: Soft, non-tender; no masses or HSM   Skin: Normal temperature, turgor and texture; no rash, ulcers or subcutaneous nodules     DATA:  CBC:   Lab Results   Component Value Date    WBC 8.6 07/24/2017    HGB 12.9 (L) 07/24/2017    HCT 39.1 (L) 07/24/2017    MCV 91.0 07/24/2017     07/24/2017     BUN/Cr:   Lab Results   Component Value Date    BUN 20 07/24/2017   ,   Lab Results   Component Value Date    CREATININE 0.85 07/24/2017     Potassium:   Lab Results   Component Value Date    K 4.9 07/24/2017     PT/INR: No results found for: INR, PROTIME    ASSESSMENT AND PLAN:          1. Patient is a 46 y.o. male with above specified procedure planned. Expected Sedation/Anesthesia Type: MAC    2. ASA ([address] Anesthesiology) Anesthesia Status: Class 1 - A normal healthy patient    3. ASA Physical Status Classification: ASA 1 - Normal health patient    4. Procedure options, risks and benefits reviewed with Patient. Patient expresses understanding.     5.  Consent has been signed:  Yes    Saige Farson

## 2017-10-11 NOTE — ANESTHESIA PRE PROCEDURE
Department of Anesthesiology  Preprocedure Note       Name:  Melly Perez   Age:  46 y.o.  :  1965                                          MRN:  [id number]         Date:  10/11/2017      Surgeon: Mihai Russell):  Cameron Lal MD    Procedure: Procedure(s):  EGD BIOPSY  COLONOSCOPY WITH BIOPSY    Medications prior to admission:   Prior to Admission medications    Medication Sig Start Date End Date Taking? Authorizing Provider   Na Sulfate-K Sulfate-Mg Sulf 17.5-3.13-1.6 GM/180ML SOLN Use as directed in your patient instructions. 10/9/17   Nely Birmingham MD   HYDROmorphone (EXALGO) 12 MG T24A extended release tablet Take 1 tablet by mouth daily . 10/6/17 10/6/18  Brett Mtz MD   metFORMIN (GLUCOPHAGE) 500 MG tablet Take 1 tablet by mouth 2 times daily (with meals) 17  Brett Mtz MD   atorvastatin (LIPITOR) 80 MG tablet Take 1 tablet by mouth daily 17  Brett Mtz MD   lisinopril (PRINIVIL;ZESTRIL) 20 MG tablet Take 1 tablet by mouth daily 17  Brett Mtz MD   mometasone (NASONEX) 50 MCG/ACT nasal spray 2 sprays by Nasal route daily 5/2/17 7/10/18  Historical Provider, MD   DULoxetine (CYMBALTA) 60 MG extended release capsule Take 1 capsule by mouth daily 17  Brett Mtz MD   gabapentin (NEURONTIN) 800 MG tablet Take 1 tablet by mouth 3 times daily 17  Brett Mtz MD   hydrochlorothiazide (HYDRODIURIL) 25 MG tablet Take 1 tablet by mouth daily 17  Brett Mtz MD   lidocaine (XYLOCAINE) 5 % ointment Apply topically daily as needed. 17  Jostin Rey CNP   ibuprofen (ADVIL;MOTRIN) 800 MG tablet Take 1 tablet by mouth every 8 hours as needed for Pain 3/20/17 3/20/18  Jostin Rey CNP   vitamin E 1000 UNITS capsule Take 1,000 Units by mouth daily    Historical Provider, MD       Current medications:    No current facility-administered medications for this encounter.         Allergies:  No Known Allergies    Problem List:    Patient Active Problem List   Diagnosis Code    Hyperlipidemia E78.5    Hypertension I10    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 M51.26    Major depressive disorder, recurrent episode, mild (HCC) F33.0    Impaired fasting blood sugar R73.01    Opioid dependence (HCC) M05.73    Umbilical hernia without obstruction and without gangrene K42.9    Positive FIT (fecal immunochemical test) R19.5    Dysphagia R13.10       Past Medical History:        Diagnosis Date    Arthritis     Dizziness     Hyperlipidemia     Hypertension     Lumbar disc herniation with radiculopathy     Nervously anxious     Osteoarthrosis of hip     Umbilical hernia        Past Surgical History:        Procedure Laterality Date    CERVICAL FUSION  2011    LUMBAR FUSION  2009    LUMBAR LAMINECTOMY  2001    TOTAL HIP ARTHROPLASTY  2006    right    TOTAL HIP ARTHROPLASTY  2005    left       Social History:    Social History   Substance Use Topics    Smoking status: Never Smoker    Smokeless tobacco: Never Used    Alcohol use Yes      Comment: social                                Counseling given: Not Answered      Vital Signs (Current): There were no vitals filed for this visit.                                            BP Readings from Last 3 Encounters:   10/09/17 130/81   07/10/17 126/78   04/07/17 110/84       NPO Status:                                                                                 BMI:   Wt Readings from Last 3 Encounters:   10/09/17 276 lb (125.2 kg)   07/10/17 265 lb 3.2 oz (120.3 kg)   04/07/17 246 lb (111.6 kg)     There is no height or weight on file to calculate BMI.    CBC:   Lab Results   Component Value Date    WBC 8.6 07/24/2017    RBC 4.30 07/24/2017    HGB 12.9 07/24/2017    HCT 39.1 07/24/2017    MCV 91.0 07/24/2017    RDW 14.9 07/24/2017     07/24/2017       CMP:   Lab Results   Component Value Date     07/24/2017    K 4.9 07/24/2017 CL 94 07/24/2017    CO2 27 07/24/2017    BUN 20 07/24/2017    CREATININE 0.85 07/24/2017    GFRAA >60 07/24/2017    LABGLOM >60 07/24/2017    GLUCOSE 101 07/24/2017    GLUCOSE 93 12/20/2011    PROT 7.4 07/24/2017    CALCIUM 9.7 07/24/2017    BILITOT 0.70 07/24/2017    ALKPHOS 123 07/24/2017    AST 17 07/24/2017    ALT 15 07/24/2017       POC Tests: No results for input(s): POCGLU, POCNA, POCK, POCCL, POCBUN, POCHEMO, POCHCT in the last 72 hours. Coags: No results found for: PROTIME, INR, APTT    HCG (If Applicable): No results found for: PREGTESTUR, PREGSERUM, HCG, HCGQUANT     ABGs: No results found for: PHART, PO2ART, RIX9IIU, NMK6OIQ, BEART, D9DRXYEX     Type & Screen (If Applicable):  No results found for: LABABO, [address]    Anesthesia Evaluation  Patient summary reviewed and Nursing notes reviewed no history of anesthetic complications:   Airway: Mallampati: II        Dental: normal exam         Pulmonary: breath sounds clear to auscultation  (+) pneumonia: resolved,      (-) COPD, asthma, shortness of breath, recent URI and sleep apnea                           Cardiovascular:  Exercise tolerance: good (>4 METS),   (+) hypertension:,     (-) pacemaker, valvular problems/murmurs, past MI, CAD, CABG/stent, dysrhythmias,  angina,  CHF, orthopnea, PND and  RODRIGUEZ      Rhythm: regular  Rate: normal           Beta Blocker:  Not on Beta Blocker         Neuro/Psych:   (+) neuromuscular disease:, psychiatric history:   (-) seizures, TIA, CVA and headaches           GI/Hepatic/Renal:   (+) bowel prep     (-) hiatal hernia, GERD, PUD, hepatitis and liver disease       Endo/Other:    (+) : arthritis:. (-) hypothyroidism, hyperthyroidism, blood dyscrasia, no Type II DM, no Type I DM               Abdominal:         (-) obese     Vascular:                                      Anesthesia Plan      general and MAC     ASA 2       Induction: intravenous. Anesthetic plan and risks discussed with patient.       Plan discussed with CRNA.                   Vinod Garcia MD   10/11/2017

## 2017-10-12 ENCOUNTER — HOSPITAL ENCOUNTER (OUTPATIENT)
Age: 52
Setting detail: SPECIMEN
Discharge: HOME | End: 2017-10-12
Payer: MEDICARE

## 2017-10-12 DIAGNOSIS — D64.9 ANEMIA, UNSPECIFIED: ICD-10-CM

## 2017-10-12 LAB
FOLATE: >20 NG/ML
VITAMIN B-12: 386 PG/ML (ref 211–946)

## 2017-10-13 LAB — SURGICAL PATHOLOGY REPORT: NORMAL

## 2017-10-20 ENCOUNTER — CARE COORDINATION (OUTPATIENT)
Dept: CARE COORDINATION | Age: 52
End: 2017-10-20

## 2017-10-20 DIAGNOSIS — M51.26 LUMBAGO DUE TO DISPLACEMENT OF INTERVERTEBRAL DISC: ICD-10-CM

## 2017-10-20 DIAGNOSIS — E78.5 HYPERLIPIDEMIA, UNSPECIFIED HYPERLIPIDEMIA TYPE: ICD-10-CM

## 2017-10-20 DIAGNOSIS — I10 ESSENTIAL HYPERTENSION: Primary | ICD-10-CM

## 2017-10-22 RX ORDER — IBUPROFEN 800 MG/1
800 TABLET ORAL EVERY 8 HOURS PRN
Qty: 270 TABLET | Refills: 2 | Status: SHIPPED | OUTPATIENT
Start: 2017-10-22 | End: 2018-02-20

## 2017-10-22 RX ORDER — GABAPENTIN 800 MG/1
800 TABLET ORAL 3 TIMES DAILY
Qty: 90 TABLET | Refills: 3 | Status: SHIPPED | OUTPATIENT
Start: 2017-10-22 | End: 2018-03-22 | Stop reason: SDUPTHER

## 2017-10-22 RX ORDER — ATORVASTATIN CALCIUM 80 MG/1
80 TABLET, FILM COATED ORAL DAILY
Qty: 30 TABLET | Refills: 3 | Status: SHIPPED | OUTPATIENT
Start: 2017-10-22 | End: 2018-03-28 | Stop reason: SDUPTHER

## 2017-10-23 ENCOUNTER — OFFICE VISIT (OUTPATIENT)
Dept: FAMILY MEDICINE CLINIC | Age: 52
End: 2017-10-23
Payer: MEDICARE

## 2017-10-23 VITALS
TEMPERATURE: 97.1 F | BODY MASS INDEX: 33.78 KG/M2 | DIASTOLIC BLOOD PRESSURE: 80 MMHG | HEIGHT: 76 IN | HEART RATE: 80 BPM | RESPIRATION RATE: 16 BRPM | SYSTOLIC BLOOD PRESSURE: 122 MMHG | WEIGHT: 277.4 LBS

## 2017-10-23 DIAGNOSIS — M51.26 LUMBAGO DUE TO DISPLACEMENT OF INTERVERTEBRAL DISC: Primary | ICD-10-CM

## 2017-10-23 DIAGNOSIS — K42.9 UMBILICAL HERNIA WITHOUT OBSTRUCTION AND WITHOUT GANGRENE: ICD-10-CM

## 2017-10-23 DIAGNOSIS — M54.16 LUMBAR RADICULOPATHY: ICD-10-CM

## 2017-10-23 DIAGNOSIS — I10 ESSENTIAL HYPERTENSION: ICD-10-CM

## 2017-10-23 DIAGNOSIS — J30.1 ACUTE SEASONAL ALLERGIC RHINITIS DUE TO POLLEN: ICD-10-CM

## 2017-10-23 PROCEDURE — 3017F COLORECTAL CA SCREEN DOC REV: CPT | Performed by: NURSE PRACTITIONER

## 2017-10-23 PROCEDURE — 1036F TOBACCO NON-USER: CPT | Performed by: NURSE PRACTITIONER

## 2017-10-23 PROCEDURE — 99214 OFFICE O/P EST MOD 30 MIN: CPT | Performed by: NURSE PRACTITIONER

## 2017-10-23 PROCEDURE — G8417 CALC BMI ABV UP PARAM F/U: HCPCS | Performed by: NURSE PRACTITIONER

## 2017-10-23 PROCEDURE — G8427 DOCREV CUR MEDS BY ELIG CLIN: HCPCS | Performed by: NURSE PRACTITIONER

## 2017-10-23 PROCEDURE — G8482 FLU IMMUNIZE ORDER/ADMIN: HCPCS | Performed by: NURSE PRACTITIONER

## 2017-10-23 RX ORDER — VITAMIN E 268 MG
400 CAPSULE ORAL 2 TIMES DAILY
COMMUNITY
End: 2018-02-20 | Stop reason: ALTCHOICE

## 2017-10-23 RX ORDER — MOMETASONE FUROATE 50 UG/1
2 SPRAY, METERED NASAL DAILY
Qty: 1 INHALER | Refills: 2 | Status: SHIPPED | OUTPATIENT
Start: 2017-10-23 | End: 2018-04-17 | Stop reason: SDUPTHER

## 2017-10-23 ASSESSMENT — ENCOUNTER SYMPTOMS
COUGH: 1
EYE DISCHARGE: 1
BACK PAIN: 1
DIARRHEA: 0
EYE REDNESS: 1
SORE THROAT: 0
TROUBLE SWALLOWING: 0
RHINORRHEA: 1
CHEST TIGHTNESS: 0
CONSTIPATION: 1
EYE ITCHING: 1
WHEEZING: 0
VOMITING: 0
SHORTNESS OF BREATH: 0
ABDOMINAL PAIN: 0
NAUSEA: 0

## 2017-10-23 NOTE — PROGRESS NOTES
Subjective:      Patient ID: Juan Manuel Whitley is a 46 y.o. male. Visit Information    Have you changed or started any medications since your last visit including any over-the-counter medicines, vitamins, or herbal medicines? no   Are you having any side effects from any of your medications? -  no  Have you stopped taking any of your medications? Is so, why? -  no    Have you seen any other physician or provider since your last visit? Yes - Records Requested  Have you had any other diagnostic tests since your last visit? No  Have you been seen in the emergency room and/or had an admission to a hospital since we last saw you? No  Have you had your routine dental cleaning in the past 6 months? no    Have you activated your [employer] account? If not, what are your barriers? Yes     Patient Care Team:  Elias Ventura MD as PCP - General (Pediatrics)  Elias Ventura MD as PCP - S Attributed Provider  Canton MD Puma as Consulting Physician (Infectious Diseases)  Gerson Garcia MD as Consulting Physician (Pulmonary Disease)  Earl Reed as Consulting Physician (Anesthesiology)  Rosalia Johnson RN as Care Coordinator    Medical History Review  Past Medical, Family, and Social History reviewed and does contribute to the patient presenting condition    Health Maintenance   Topic Date Due    Diabetes screen  07/24/2020    Lipid screen  07/24/2022    Colon cancer screen colonoscopy  10/11/2022    DTaP/Tdap/Td vaccine (2 - Td) 07/12/2026    Flu vaccine  Completed    Hepatitis C screen  Completed    HIV screen  Completed       Patient here today for Pre-Op Clearance. Patient is scheduled for his Pre-Op testing on 11/2/17 and his surgery on 11/16/17. Patient will be having [facility] by Dr. Yuly Mabry. He will be having his fusion extended from L3-S1. Electronically signed by Irma Winn on 10/23/2017 at 10:02 AM      Patient presents in office today for pre-op medical clearance.  He is going oropharynx is clear and moist and mucous membranes are normal. No posterior oropharyngeal edema or posterior oropharyngeal erythema. Eyes: Conjunctivae are normal. Right eye exhibits no discharge. Left eye exhibits no discharge. Neck: Neck supple. No JVD present. Cardiovascular: Normal rate, regular rhythm and normal heart sounds. Pulses:       Carotid pulses are 2+ on the right side, and 2+ on the left side. Radial pulses are 2+ on the right side, and 2+ on the left side. Pulmonary/Chest: Effort normal and breath sounds normal. No respiratory distress. He has no wheezes. He has no rales. Abdominal: Soft. Bowel sounds are normal. He exhibits no distension. There is no tenderness. There is no guarding. A hernia (umbilical, easily reducible) is present. Musculoskeletal: He exhibits no edema. Lumbar back: He exhibits decreased range of motion and pain. He exhibits no bony tenderness and no swelling. No red or swollen joints   Lymphadenopathy:     He has no cervical adenopathy. Neurological: He is alert and oriented to person, place, and time. Skin: Skin is warm. No rash noted. He is diaphoretic. Psychiatric: He has a normal mood and affect. His behavior is normal.   Nursing note and vitals reviewed. Assessment:      1. 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     2. Lumbar radiculopathy     3. Acute seasonal allergic rhinitis due to pollen  mometasone (NASONEX) 50 MCG/ACT nasal spray   4. Essential hypertension     5. Umbilical hernia without obstruction and without gangrene             Plan:      Continue medications as prescribed  Advised No NSAIDs at least 7-10 days prior to surgery  Will complete scheduled pre-op testing on 11/2 at [facility]. Will await results. Advised to restart Nasonex and recommend OTC antihistamine as needed for allergies  Umbilical hernia stable. Will monitor. Follow up with neurosurgery as planned.    Call with concerns      Controlled Substances Monitoring:     Attestation: The Prescription Monitoring Report for this patient was reviewed today. Marbella Blas CNP)  Documentation: No signs of potential drug abuse or diversion identified. Marbella Blas CNP)    1. Martín Hawkins received counseling on the following healthy behaviors: nutrition, exercise and medication adherence  2. Reviewed prior labs and health maintenance  3. Continue current medications, diet and exercise. 4.  Discussed use, benefit, and side effects of prescribed medications. Barriers to medication compliance addressed. All patient questions answered. Pt voiced understanding. 5.  Patient given educational materials - see patient instructions  6. Was a self-tracking handout given in paper form or via My[employer]? No  If yes, see orders or list here.       Completed Refills   Requested Prescriptions     Signed Prescriptions Disp Refills    mometasone (NASONEX) 50 MCG/ACT nasal spray 1 Inhaler 2     Si sprays by Nasal route daily

## 2017-10-24 NOTE — TELEPHONE ENCOUNTER
Patient contacted and notified of pending lab orders.  Patient states he will complete in the lab in this office

## 2017-11-03 ENCOUNTER — CARE COORDINATION (OUTPATIENT)
Dept: CARE COORDINATION | Age: 52
End: 2017-11-03

## 2017-11-03 DIAGNOSIS — D72.829 LEUKOCYTOSIS, UNSPECIFIED TYPE: Primary | ICD-10-CM

## 2017-11-03 DIAGNOSIS — M51.26 LUMBAGO DUE TO DISPLACEMENT OF INTERVERTEBRAL DISC: ICD-10-CM

## 2017-11-03 NOTE — CARE COORDINATION
tablet by mouth every 8 hours as needed for Pain 10/22/17   Brady King MD   gabapentin (NEURONTIN) 800 MG tablet Take 1 tablet by mouth 3 times daily 10/22/17 10/22/18  Brady King MD   atorvastatin (LIPITOR) 80 MG tablet Take 1 tablet by mouth daily 10/22/17 10/22/18  Brady King MD   Na Sulfate-K Sulfate-Mg Sulf 17.5-3.13-1.6 GM/180ML SOLN Use as directed in your patient instructions. 10/9/17   Nely Birmingham MD   HYDROmorphone (EXALGO) 12 MG T24A extended release tablet Take 1 tablet by mouth daily . 10/6/17 10/6/18  Brady King MD   metFORMIN (GLUCOPHAGE) 500 MG tablet Take 1 tablet by mouth 2 times daily (with meals) 9/17/17 9/17/18  Brady King MD   lisinopril (PRINIVIL;ZESTRIL) 20 MG tablet Take 1 tablet by mouth daily 7/18/17 7/18/18  Brady King MD   DULoxetine (CYMBALTA) 60 MG extended release capsule Take 1 capsule by mouth daily 6/19/17 6/19/18  Brady King MD   hydrochlorothiazide (HYDRODIURIL) 25 MG tablet Take 1 tablet by mouth daily 5/19/17 5/19/18  Brady King MD   lidocaine (XYLOCAINE) 5 % ointment Apply topically daily as needed.  4/26/17 4/26/18  Tonya Wilson CNP       Future Appointments  Date Time Provider Donovan Peraza   11/6/2017 1:30 PM Carly Pierson MD [facility]   11/14/2017 10:20 AM MD Marcus Vega

## 2017-11-03 NOTE — TELEPHONE ENCOUNTER
LOV 10-23-17  LRF 10-6-17  OARRS last reviewed 10-23-17 by [facility] AT [facility]  Health Maintenance   Topic Date Due    Diabetes screen  07/24/2020    Lipid screen  07/24/2022    Colon cancer screen colonoscopy  10/11/2022    DTaP/Tdap/Td vaccine (2 - Td) 07/12/2026    Flu vaccine  Completed    Hepatitis C screen  Completed    HIV screen  Completed             (applicable per patient's age: Cancer Screenings, Depression Screening, Fall Risk Screening, Immunizations)    Hemoglobin A1C (%)   Date Value   07/24/2017 6.2 (H)   01/23/2017 6     LDL Cholesterol (mg/dL)   Date Value   07/24/2017          AST (U/L)   Date Value   07/24/2017 17     ALT (U/L)   Date Value   07/24/2017 15     BUN (mg/dL)   Date Value   07/24/2017 20      (goal A1C is < 7)   (goal LDL is <100) need 30-50% reduction from baseline     BP Readings from Last 3 Encounters:   10/23/17 122/80   10/11/17 110/75   10/11/17 115/73    (goal /80)      All Future Testing planned in [facility]:  Lab Frequency Next Occurrence   Iron and TIBC Once 01/09/2018   EGD Once 10/09/2017   COLONOSCOPY W/ OR W/O BIOPSY Once 10/09/2017   Lipid, Fasting Once 10/27/2017   Comprehensive Metabolic Panel Once 35/83/1326       Next Visit Date:  Future Appointments  Date Time Provider Donovan Peraza   11/6/2017 1:30 PM Gonzalo Green MD [facility]   11/14/2017 10:20 AM [facility]            Patient Active Problem List:     Hyperlipidemia     Hypertension     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     Major depressive disorder, recurrent episode, mild (HCC)     Impaired fasting blood sugar     Opioid dependence ([address]     Umbilical hernia without obstruction and without gangrene     Positive FIT (fecal immunochemical test)     Dysphagia

## 2017-11-03 NOTE — TELEPHONE ENCOUNTER
At his last visit with Scooter Angel, he stated he was not sure medication was even helping any with pain. Is it work continuing the narcotic pain med since he feels it is not helping?

## 2017-11-03 NOTE — TELEPHONE ENCOUNTER
From: Jaison Dalton  Sent: 11/3/2017 9:00 AM EDT  Subject: Medication Renewal Request    Tori Bateman Aas would like a refill of the following medications:  HYDROmorphone (EXALGO) 12 MG T24A extended release tablet Abrahan Art MD]    Preferred pharmacy: [address] [place] [address] [phone] - F [phone]    Comment:

## 2017-11-03 NOTE — TELEPHONE ENCOUNTER
Patient states Rx is moderately effective and has substantially less side effects than other narcotics that he has tried previously.

## 2017-11-06 ENCOUNTER — OFFICE VISIT (OUTPATIENT)
Dept: GASTROENTEROLOGY | Age: 52
End: 2017-11-06
Payer: MEDICARE

## 2017-11-06 VITALS
BODY MASS INDEX: 34.22 KG/M2 | OXYGEN SATURATION: 99 % | HEIGHT: 76 IN | DIASTOLIC BLOOD PRESSURE: 90 MMHG | WEIGHT: 281 LBS | SYSTOLIC BLOOD PRESSURE: 156 MMHG | TEMPERATURE: 97.3 F | HEART RATE: 86 BPM | RESPIRATION RATE: 14 BRPM

## 2017-11-06 DIAGNOSIS — K57.30 DIVERTICULOSIS OF LARGE INTESTINE WITHOUT HEMORRHAGE: ICD-10-CM

## 2017-11-06 DIAGNOSIS — K21.9 GASTROESOPHAGEAL REFLUX DISEASE WITHOUT ESOPHAGITIS: ICD-10-CM

## 2017-11-06 DIAGNOSIS — R13.10 DYSPHAGIA, UNSPECIFIED TYPE: Primary | ICD-10-CM

## 2017-11-06 DIAGNOSIS — D12.2 ADENOMATOUS POLYP OF ASCENDING COLON: ICD-10-CM

## 2017-11-06 PROCEDURE — G8417 CALC BMI ABV UP PARAM F/U: HCPCS | Performed by: INTERNAL MEDICINE

## 2017-11-06 PROCEDURE — 1036F TOBACCO NON-USER: CPT | Performed by: INTERNAL MEDICINE

## 2017-11-06 PROCEDURE — 99214 OFFICE O/P EST MOD 30 MIN: CPT | Performed by: INTERNAL MEDICINE

## 2017-11-06 PROCEDURE — G8482 FLU IMMUNIZE ORDER/ADMIN: HCPCS | Performed by: INTERNAL MEDICINE

## 2017-11-06 PROCEDURE — 3017F COLORECTAL CA SCREEN DOC REV: CPT | Performed by: INTERNAL MEDICINE

## 2017-11-06 PROCEDURE — G8428 CUR MEDS NOT DOCUMENT: HCPCS | Performed by: INTERNAL MEDICINE

## 2017-11-06 RX ORDER — OMEPRAZOLE 20 MG/1
20 TABLET, DELAYED RELEASE ORAL DAILY
Qty: 30 TABLET | Refills: 3 | Status: SHIPPED | OUTPATIENT
Start: 2017-11-06 | End: 2017-12-18 | Stop reason: SDUPTHER

## 2017-11-06 RX ORDER — OMEPRAZOLE 20 MG/1
20 TABLET, DELAYED RELEASE ORAL DAILY
COMMUNITY
End: 2017-11-06 | Stop reason: SDUPTHER

## 2017-11-06 ASSESSMENT — ENCOUNTER SYMPTOMS
COUGH: 1
ABDOMINAL DISTENTION: 0
EYES NEGATIVE: 1
ABDOMINAL PAIN: 0
CONSTIPATION: 0
SINUS PRESSURE: 1
ANAL BLEEDING: 0
NAUSEA: 0
BACK PAIN: 1
ALLERGIC/IMMUNOLOGIC NEGATIVE: 1
TROUBLE SWALLOWING: 0
RECTAL PAIN: 0
VOMITING: 0
DIARRHEA: 0
BLOOD IN STOOL: 0
GASTROINTESTINAL NEGATIVE: 1

## 2017-11-06 NOTE — PROGRESS NOTES
Subjective:      Patient ID: Rachel Dover is a 45 y.o. male. HPI      Dr. Sarah Ryan MD our mutual patient Rachel Dover was seen  for   1. Dysphagia, unspecified type    2. Gastroesophageal reflux disease without esophagitis    3. Diverticulosis of large intestine without hemorrhage    4. Adenomatous polyp of ascending colon     . Patient is here for follow up, Dysphagia (states doing much better ) and Gastroesophageal Reflux (started taking Prilosec OTC once a day and that seems to be controlling the symptoms well )  Patient is here for follow-up his EGD and colonoscopy were done as below  He said his dysphagia has resolved as long as taking the medication is doing okay he does not have any heartburn odynophagia nor dysphagia no black stool or blood in the stool. Did have diverticulosis he has no pain no indication of diverticulitis. Colonoscopy showed a tiny polyp. Patient in general doing very well he said at this time as long as taking his Prilosec is asymptomatic  No N/v   no abd pain   no melena/hematemsis/hematochezia  No dysphagia/odynophagia   no wt loss   no diarrhea /contipation    Past Medical, Family, and Social History reviewed and does  contribute to the patient presenting condition.     patient\"s PMH/PSH,SH,PSYCH hx, MEDs, ALLERGIES, and ROS was all reviewed and updated ion the appropriate sections    PROCEDURE NOTE     DATE OF PROCEDURE: 10/11/2017      SURGEON: Dante Sharp MD  Facility: [facility]  ASSISTANT: None  Anesthesia: MAC  PREOPERATIVE DIAGNOSIS:   dysphagia  Anemia     Diagnosis:  Nothing in esophagus tpo explain dysphagia seen  Gastritis, bxs taken         POSTOPERATIVE DIAGNOSIS: As described below     OPERATION: Upper GI endoscopy with Biopsy     ANESTHESIA: Moderate Sedation      ESTIMATED BLOOD LOSS: Less than 50 ml     COMPLICATIONS: None.      SPECIMENS:  Was Obtained:      Gastritis, bxs taken      HISTORY: The patient is a 46y.o. year old male with history of not limited to bleeding, infection, respiratory distress, hypotension, and perforation of the colon and possibility of missing a lesion. The patient understands and is in agreement. PROCEDURE: The patient was given IV conscious sedation. The patient's SPO2 remained above 90% throughout the procedure.      The colonoscope was inserted per rectum and advanced under direct vision to the cecum without difficulty.       The prep was good.       Findings:  Terminal ileum: normal     Cecum/Ascending colon: abnormal: Tiny sessile polyp 6 mm, cold bx rt colon . few diverticula      Transverse colon: abnormal: few diverticula      Descending/Sigmoid colon: abnormal: few diverticula      Rectum/Anus: examined in normal and retroflexed positions and was abnormal:  Internal hemorrhoids      Withdrawal Time was (minutes): 10     The colon was decompressed and the scope was removed. The patient tolerated the procedure well.      Recommendations/Plan:   1. Lifestyle and dietary modifications as discussed  2. F/U Biopsies  3. F/U In OfficeYes  4. Discussed with the family  5. Repeat colonoscopy [id number]     Electronically signed by Mike Newman MD  on 10/11/2017 at 12:34 PM    Diagnosis --   1.  STOMACH, BIOPSIES:       -  MILD CHRONIC INACTIVE GASTRITIS.     -  NEGATIVE FOR ACTIVE GASTRITIS, INTESTINAL METAPLASIA OR   DYSPLASIA. 2.  RIGHT COLON, BIOPSY:       -  NORMAL COLONIC MUCOSA. Rufino Oliveira M.D.   **Electronically Signed Out**         jet/10/13/2017          Review of Systems   Constitutional: Positive for fatigue. HENT: Positive for congestion, postnasal drip and sinus pressure. Negative for trouble swallowing. Eyes: Negative. Respiratory: Positive for cough. Cardiovascular: Negative. Gastrointestinal: Negative. Negative for abdominal distention, abdominal pain, anal bleeding, blood in stool, constipation, diarrhea, nausea, rectal pain and vomiting. Endocrine: Negative.

## 2017-11-09 ENCOUNTER — TELEPHONE (OUTPATIENT)
Dept: FAMILY MEDICINE CLINIC | Age: 52
End: 2017-11-09

## 2017-11-09 NOTE — TELEPHONE ENCOUNTER
I have no cleared him yet. He had elevated WBC and was to repeat CBC this week. I do not see results of repeat CBC yet. Has he done?

## 2017-11-09 NOTE — TELEPHONE ENCOUNTER
Bing Rueda from Dr. Art Goldsmith office calling for clearance letter.   Results in care-everywhere    Fax letter to: [phone]    Electronically signed by Jerrel Cockayne on 11/9/2017 at 1:55 PM

## 2017-11-09 NOTE — TELEPHONE ENCOUNTER
Patient came this morning to have labs done because he needs fasting labs completed as well.   He states that he will be coming in tomorrow morning to have them completed

## 2017-11-10 ENCOUNTER — HOSPITAL ENCOUNTER (OUTPATIENT)
Age: 52
Setting detail: SPECIMEN
Discharge: HOME | End: 2017-11-10
Payer: MEDICARE

## 2017-11-10 DIAGNOSIS — I10 ESSENTIAL HYPERTENSION: ICD-10-CM

## 2017-11-10 DIAGNOSIS — E78.5 HYPERLIPIDEMIA, UNSPECIFIED HYPERLIPIDEMIA TYPE: ICD-10-CM

## 2017-11-10 DIAGNOSIS — D72.829 LEUKOCYTOSIS, UNSPECIFIED TYPE: ICD-10-CM

## 2017-11-10 LAB
ALBUMIN SERPL-MCNC: 4.5 G/DL (ref 3.5–5.2)
ALBUMIN/GLOBULIN RATIO: 1.4 (ref 1–2.5)
ALP BLD-CCNC: 122 U/L (ref 40–129)
ALT SERPL-CCNC: 17 U/L (ref 5–41)
ANION GAP SERPL CALCULATED.3IONS-SCNC: 20 MMOL/L (ref 9–17)
AST SERPL-CCNC: 19 U/L
BILIRUB SERPL-MCNC: 0.48 MG/DL (ref 0.3–1.2)
BUN BLDV-MCNC: 21 MG/DL (ref 6–20)
BUN/CREAT BLD: ABNORMAL (ref 9–20)
CALCIUM SERPL-MCNC: 10 MG/DL (ref 8.6–10.4)
CHLORIDE BLD-SCNC: 90 MMOL/L (ref 98–107)
CHOLESTEROL, FASTING: 225 MG/DL
CHOLESTEROL/HDL RATIO: 4.5
CO2: 26 MMOL/L (ref 20–31)
CREAT SERPL-MCNC: 0.81 MG/DL (ref 0.7–1.2)
GFR AFRICAN AMERICAN: >60 ML/MIN
GFR NON-AFRICAN AMERICAN: >60 ML/MIN
GFR SERPL CREATININE-BSD FRML MDRD: ABNORMAL ML/MIN/{1.73_M2}
GFR SERPL CREATININE-BSD FRML MDRD: ABNORMAL ML/MIN/{1.73_M2}
GLUCOSE BLD-MCNC: 96 MG/DL (ref 70–99)
HCT VFR BLD CALC: 41.3 % (ref 40.7–50.3)
HDLC SERPL-MCNC: 50 MG/DL
HEMOGLOBIN: 13 G/DL (ref 13–17)
LDL CHOLESTEROL DIRECT: 116 MG/DL
LDL CHOLESTEROL: ABNORMAL MG/DL (ref 0–130)
MCH RBC QN AUTO: 30.2 PG (ref 25.2–33.5)
MCHC RBC AUTO-ENTMCNC: 31.5 G/DL (ref 28.4–34.8)
MCV RBC AUTO: 96 FL (ref 82.6–102.9)
PDW BLD-RTO: 12.8 % (ref 11.8–14.4)
PLATELET # BLD: 425 K/UL (ref 138–453)
PMV BLD AUTO: 9.6 FL (ref 8.1–13.5)
POTASSIUM SERPL-SCNC: 4.6 MMOL/L (ref 3.7–5.3)
RBC # BLD: 4.3 M/UL (ref 4.21–5.77)
SODIUM BLD-SCNC: 136 MMOL/L (ref 135–144)
TOTAL PROTEIN: 7.8 G/DL (ref 6.4–8.3)
TRIGLYCERIDE, FASTING: 554 MG/DL
VLDLC SERPL CALC-MCNC: ABNORMAL MG/DL (ref 1–30)
WBC # BLD: 8.8 K/UL (ref 3.5–11.3)

## 2017-11-14 ENCOUNTER — OFFICE VISIT (OUTPATIENT)
Dept: FAMILY MEDICINE CLINIC | Age: 52
End: 2017-11-14
Payer: MEDICARE

## 2017-11-14 VITALS
BODY MASS INDEX: 33.47 KG/M2 | DIASTOLIC BLOOD PRESSURE: 84 MMHG | SYSTOLIC BLOOD PRESSURE: 118 MMHG | HEART RATE: 72 BPM | RESPIRATION RATE: 20 BRPM | WEIGHT: 275 LBS | TEMPERATURE: 98.3 F

## 2017-11-14 DIAGNOSIS — I10 ESSENTIAL HYPERTENSION: ICD-10-CM

## 2017-11-14 DIAGNOSIS — M51.26 LUMBAGO DUE TO DISPLACEMENT OF INTERVERTEBRAL DISC: Primary | ICD-10-CM

## 2017-11-14 DIAGNOSIS — E78.00 PURE HYPERCHOLESTEROLEMIA: ICD-10-CM

## 2017-11-14 PROCEDURE — G8482 FLU IMMUNIZE ORDER/ADMIN: HCPCS | Performed by: PEDIATRICS

## 2017-11-14 PROCEDURE — G8417 CALC BMI ABV UP PARAM F/U: HCPCS | Performed by: PEDIATRICS

## 2017-11-14 PROCEDURE — 99213 OFFICE O/P EST LOW 20 MIN: CPT | Performed by: PEDIATRICS

## 2017-11-14 PROCEDURE — 3017F COLORECTAL CA SCREEN DOC REV: CPT | Performed by: PEDIATRICS

## 2017-11-14 PROCEDURE — G8427 DOCREV CUR MEDS BY ELIG CLIN: HCPCS | Performed by: PEDIATRICS

## 2017-11-14 PROCEDURE — 1036F TOBACCO NON-USER: CPT | Performed by: PEDIATRICS

## 2017-11-14 ASSESSMENT — ENCOUNTER SYMPTOMS
NAUSEA: 0
CONSTIPATION: 0
DIARRHEA: 0
SHORTNESS OF BREATH: 0
WHEEZING: 0
COUGH: 0

## 2017-11-14 NOTE — PROGRESS NOTES
Subjective:      Patient ID: Tim Garg is a 45 y.o. male. Visit Information    Have you changed or started any medications since your last visit including any over-the-counter medicines, vitamins, or herbal medicines? yes - Hold Vitamins and Motrin for upcoming surgery   Are you having any side effects from any of your medications? -  no  Have you stopped taking any of your medications? Is so, why? -  yes - Hold for surgery    Have you seen any other physician or provider since your last visit? Yes - Records Obtained  Have you had any other diagnostic tests since your last visit? Yes - Records Obtained  Have you been seen in the emergency room and/or had an admission to a hospital since we last saw you? No  Have you had your routine dental cleaning in the past 6 months? yes - Routinely    Have you activated your [employer] account? If not, what are your barriers?  Yes     Patient Care Team:  Woody Gray MD as PCP - General (Internal Medicine/Pediatrics)  Woody Gray MD as PCP - S Attributed Provider  Queenie Seals MD as Consulting Physician (Infectious Diseases)  Narcisa Lees MD as Consulting Physician (Pulmonary Disease)  Carlene Landry as Consulting Physician (Anesthesiology)  Priyanka Edmonds RN as Care Coordinator    Medical History Review  Past Medical, Family, and Social History reviewed and does contribute to the patient presenting condition    Health Maintenance   Topic Date Due    Colon cancer screen colonoscopy  10/11/2022    Lipid screen  11/10/2022    DTaP/Tdap/Td vaccine (2 - Td) 07/12/2026    Flu vaccine  Completed    Hepatitis C screen  Completed    HIV screen  Completed     HPI     Chief Complaint   Patient presents with    Back Pain    Hypertension    Hyperlipidemia       Wt Readings from Last 3 Encounters:   11/14/17 275 lb (124.7 kg)   11/06/17 281 lb (127.5 kg)   10/23/17 277 lb 6.4 oz (125.8 kg)     BP Readings from Last 3 Encounters:   11/14/17 118/84   11/06/17 (!) 156/90   10/23/17 122/80     Pulse Readings from Last 3 Encounters:   11/14/17 72   11/06/17 86   10/23/17 80         PHQ-9 Total Score: 1 (7/10/2017 11:13 AM)    Has surgery in 2 days on lower back. Had recent fasting labs. bp has been good. Diet is ok. Just not able to exercise a lot due to hip and back. May miss statin med at times. No recent illness. occ light headed when going from sitting to standing. Electronically signed by Anselmo Villalobos MD on 11/14/2017 at 10:52 AM      Review of Systems   Constitutional: Positive for fatigue. Negative for fever. HENT:        Recardo Kuster a few weeks ago but has resolved. Had some sinus drainage. Now resolved. Respiratory: Negative for cough, shortness of breath and wheezing. Cardiovascular: Positive for leg swelling (trace at times. ). Negative for chest pain. Gastrointestinal: Negative for constipation, diarrhea and nausea. Less constipation with current pain med. Genitourinary: Negative for dysuria. Objective:   Physical Exam   Constitutional: He is oriented to person, place, and time. He appears well-developed. Overweight    HENT:   Head: Normocephalic. Cardiovascular: Normal rate, regular rhythm and normal heart sounds. Pulses:       Carotid pulses are 2+ on the right side, and 2+ on the left side. Radial pulses are 2+ on the right side, and 2+ on the left side. Pulmonary/Chest: Effort normal and breath sounds normal. No respiratory distress. He has no wheezes. He has no rales. Abdominal: Soft. Bowel sounds are normal. He exhibits no distension. There is no tenderness. Protuberant    Musculoskeletal: He exhibits no edema. Neurological: He is alert and oriented to person, place, and time. He exhibits normal muscle tone. Skin: Skin is warm. Psychiatric: He has a normal mood and affect. His behavior is normal.       Assessment:      1. 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    Continue meds

## 2017-11-15 DIAGNOSIS — I10 ESSENTIAL HYPERTENSION: ICD-10-CM

## 2017-11-15 RX ORDER — HYDROCHLOROTHIAZIDE 25 MG/1
TABLET ORAL
Qty: 30 TABLET | Refills: 5 | Status: SHIPPED | OUTPATIENT
Start: 2017-11-15 | End: 2017-11-15 | Stop reason: SDUPTHER

## 2017-11-15 NOTE — TELEPHONE ENCOUNTER
LOV 11/14/17  LRF 5/19/17     Health Maintenance   Topic Date Due    Colon cancer screen colonoscopy  10/11/2022    Lipid screen  11/10/2022    DTaP/Tdap/Td vaccine (2 - Td) 07/12/2026    Flu vaccine  Completed    Hepatitis C screen  Completed    HIV screen  Completed             (applicable per patient's age: Cancer Screenings, Depression Screening, Fall Risk Screening, Immunizations)    Hemoglobin A1C (%)   Date Value   07/24/2017 6.2 (H)   01/23/2017 6     LDL Cholesterol (mg/dL)   Date Value   11/10/2017          AST (U/L)   Date Value   11/10/2017 19     ALT (U/L)   Date Value   11/10/2017 17     BUN (mg/dL)   Date Value   11/10/2017 21 (H)      (goal A1C is < 7)   (goal LDL is <100) need 30-50% reduction from baseline     BP Readings from Last 3 Encounters:   11/14/17 118/84   11/06/17 (!) 156/90   10/23/17 122/80    (goal /80)      All Future Testing planned in [facility]:  Lab Frequency Next Occurrence   Iron and TIBC Once 01/09/2018   EGD Once 10/09/2017   COLONOSCOPY W/ OR W/O BIOPSY Once 10/09/2017       Next Visit Date:  Future Appointments  Date Time Provider Donovan Peraza   2/20/2018 10:40 AM MD Marcus Eduardo [facility]   5/21/2018 1:00 PM Jagdeep Thorne MD [facility]            Patient Active Problem List:     Hyperlipidemia     Hypertension     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

## 2017-11-15 NOTE — TELEPHONE ENCOUNTER
Last OV 11/14, Last refill 11/15.  BP  Health Maintenance   Topic Date Due    Colon cancer screen colonoscopy  10/11/2022    Lipid screen  11/10/2022    DTaP/Tdap/Td vaccine (2 - Td) 07/12/2026    Flu vaccine  Completed    Hepatitis C screen  Completed    HIV screen  Completed             (applicable per patient's age: Cancer Screenings, Depression Screening, Fall Risk Screening, Immunizations)    Hemoglobin A1C (%)   Date Value   07/24/2017 6.2 (H)   01/23/2017 6     LDL Cholesterol (mg/dL)   Date Value   11/10/2017          AST (U/L)   Date Value   11/10/2017 19     ALT (U/L)   Date Value   11/10/2017 17     BUN (mg/dL)   Date Value   11/10/2017 21 (H)      (goal A1C is < 7)   (goal LDL is <100) need 30-50% reduction from baseline     BP Readings from Last 3 Encounters:   11/14/17 118/84   11/06/17 (!) 156/90   10/23/17 122/80    (goal /80)      All Future Testing planned in [facility]:  Lab Frequency Next Occurrence   Iron and TIBC Once 01/09/2018   EGD Once 10/09/2017   COLONOSCOPY W/ OR W/O BIOPSY Once 10/09/2017       Next Visit Date:  Future Appointments  Date Time Provider Donovan Peraza   2/20/2018 10:40 AM MD Marcus [facility]   5/21/2018 1:00 PM Diana Costello MD [facility]            Patient Active Problem List:     Hyperlipidemia     Hypertension     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

## 2017-11-16 RX ORDER — HYDROCHLOROTHIAZIDE 25 MG/1
25 TABLET ORAL DAILY
Qty: 90 TABLET | Refills: 1 | Status: SHIPPED | OUTPATIENT
Start: 2017-11-16 | End: 2017-11-27 | Stop reason: ALTCHOICE

## 2017-11-17 ENCOUNTER — CARE COORDINATION (OUTPATIENT)
Dept: CARE COORDINATION | Age: 52
End: 2017-11-17

## 2017-11-17 DIAGNOSIS — M51.26 LUMBAGO DUE TO DISPLACEMENT OF INTERVERTEBRAL DISC: ICD-10-CM

## 2017-11-17 DIAGNOSIS — I10 ESSENTIAL HYPERTENSION: ICD-10-CM

## 2017-11-17 DIAGNOSIS — F33.0 MAJOR DEPRESSIVE DISORDER, RECURRENT EPISODE, MILD (HCC): ICD-10-CM

## 2017-11-17 RX ORDER — LISINOPRIL 20 MG/1
20 TABLET ORAL DAILY
Qty: 90 TABLET | Refills: 1 | Status: SHIPPED | OUTPATIENT
Start: 2017-11-17 | End: 2017-11-27 | Stop reason: ALTCHOICE

## 2017-11-17 RX ORDER — HYDROCHLOROTHIAZIDE 25 MG/1
TABLET ORAL
Qty: 90 TABLET | Refills: 0 | OUTPATIENT
Start: 2017-11-17

## 2017-11-17 RX ORDER — DULOXETIN HYDROCHLORIDE 60 MG/1
60 CAPSULE, DELAYED RELEASE ORAL DAILY
Qty: 90 CAPSULE | Refills: 1 | Status: SHIPPED | OUTPATIENT
Start: 2017-11-17 | End: 2018-01-30 | Stop reason: SDUPTHER

## 2017-11-17 NOTE — CARE COORDINATION
Attempted to call pt left VM to call [phone] opt [phone]  Per [facility] is still admitted at [facility]

## 2017-11-20 ENCOUNTER — CARE COORDINATION (OUTPATIENT)
Dept: CARE COORDINATION | Age: 52
End: 2017-11-20

## 2017-11-27 ENCOUNTER — OFFICE VISIT (OUTPATIENT)
Dept: FAMILY MEDICINE CLINIC | Age: 52
End: 2017-11-27
Payer: MEDICARE

## 2017-11-27 VITALS
RESPIRATION RATE: 19 BRPM | SYSTOLIC BLOOD PRESSURE: 130 MMHG | WEIGHT: 274 LBS | HEART RATE: 88 BPM | BODY MASS INDEX: 33.35 KG/M2 | DIASTOLIC BLOOD PRESSURE: 89 MMHG | TEMPERATURE: 98.3 F

## 2017-11-27 DIAGNOSIS — M51.26 LUMBAGO DUE TO DISPLACEMENT OF INTERVERTEBRAL DISC: Primary | ICD-10-CM

## 2017-11-27 DIAGNOSIS — Z98.1 S/P LAMINECTOMY WITH SPINAL FUSION: ICD-10-CM

## 2017-11-27 DIAGNOSIS — I10 ESSENTIAL HYPERTENSION: ICD-10-CM

## 2017-11-27 PROCEDURE — 3017F COLORECTAL CA SCREEN DOC REV: CPT | Performed by: NURSE PRACTITIONER

## 2017-11-27 PROCEDURE — G8482 FLU IMMUNIZE ORDER/ADMIN: HCPCS | Performed by: NURSE PRACTITIONER

## 2017-11-27 PROCEDURE — G8427 DOCREV CUR MEDS BY ELIG CLIN: HCPCS | Performed by: NURSE PRACTITIONER

## 2017-11-27 PROCEDURE — G8417 CALC BMI ABV UP PARAM F/U: HCPCS | Performed by: NURSE PRACTITIONER

## 2017-11-27 PROCEDURE — 1036F TOBACCO NON-USER: CPT | Performed by: NURSE PRACTITIONER

## 2017-11-27 PROCEDURE — 99214 OFFICE O/P EST MOD 30 MIN: CPT | Performed by: NURSE PRACTITIONER

## 2017-11-27 RX ORDER — TIZANIDINE 4 MG/1
4 TABLET ORAL
COMMUNITY
Start: 2017-11-19 | End: 2017-12-19

## 2017-11-27 RX ORDER — HYDROCODONE BITARTRATE AND ACETAMINOPHEN 7.5; 325 MG/1; MG/1
TABLET ORAL
Refills: 0 | COMMUNITY
Start: 2017-11-19 | End: 2018-02-20 | Stop reason: ALTCHOICE

## 2017-11-27 ASSESSMENT — ENCOUNTER SYMPTOMS
DIARRHEA: 0
ABDOMINAL PAIN: 0
RHINORRHEA: 0
NAUSEA: 0
COUGH: 0
BACK PAIN: 1
WHEEZING: 0
SORE THROAT: 0
EYE DISCHARGE: 0
EYE PAIN: 0
TROUBLE SWALLOWING: 0
SHORTNESS OF BREATH: 0
VOMITING: 0
CONSTIPATION: 0

## 2017-11-27 NOTE — PROGRESS NOTES
Visit Information    Have you changed or started any medications since your last visit including any over-the-counter medicines, vitamins, or herbal medicines? yes - medication list updated   Have you stopped taking any of your medications? Is so, why? -  yes - medication list updated  Are you having any side effects from any of your medications? - no    Have you seen any other physician or provider since your last visit?  no   Have you had any other diagnostic tests since your last visit?  no   Have you been seen in the emergency room and/or had an admission in a hospital since we last saw you?  yes [facility]   Have you had your routine dental cleaning in the past 6 months?  yes - routinely     Do you have an active MyChart account? If no, what is the barrier?   Yes    Patient Care Team:  Anaya Castro MD as PCP - General (Internal Medicine/Pediatrics)  Anaya Castro MD as PCP - [facility] Attributed Provider  Mayo Castorena MD as Consulting Physician (Infectious Diseases)  Bill Garza MD as Consulting Physician (Pulmonary Disease)  Chaparro Meyers as Consulting Physician (Anesthesiology)  Carlo Lock RN as Care Coordinator    Medical History Review  Past Medical, Family, and Social History reviewed and does contribute to the patient presenting condition    Health Maintenance   Topic Date Due    Colon cancer screen colonoscopy  10/11/2022    Lipid screen  11/10/2022    DTaP/Tdap/Td vaccine (2 - Td) 07/12/2026    Flu vaccine  Completed    Hepatitis C screen  Completed    HIV screen  Completed

## 2017-11-27 NOTE — PROGRESS NOTES
Subjective:      Patient ID: Jason Huerta is a 45 y.o. male. Visit Information    Have you changed or started any medications since your last visit including any over-the-counter medicines, vitamins, or herbal medicines? yes - medication list updated   Have you stopped taking any of your medications? Is so, why? -  yes - medication list updated  Are you having any side effects from any of your medications? - no    Have you seen any other physician or provider since your last visit?  no   Have you had any other diagnostic tests since your last visit?  no   Have you been seen in the emergency room and/or had an admission in a hospital since we last saw you?  yes [facility]   Have you had your routine dental cleaning in the past 6 months?  yes - routinely     Do you have an active MyChart account? If no, what is the barrier? Yes    Patient Care Team:  Blake Ballesteros MD as PCP - General (Internal Medicine/Pediatrics)  Blake Ballesteros MD as PCP - S Attributed Provider  Sukumar Orr MD as Consulting Physician (Infectious Diseases)  Marley Franklin MD as Consulting Physician (Pulmonary Disease)  Jorge Alberto Herron as Consulting Physician (Anesthesiology)  Jennifer Jean, RN as Care Coordinator    Medical History Review  Past Medical, Family, and Social History reviewed and does contribute to the patient presenting condition    Health Maintenance   Topic Date Due    Colon cancer screen colonoscopy  10/11/2022    Lipid screen  11/10/2022    DTaP/Tdap/Td vaccine (2 - Td) 07/12/2026    Flu vaccine  Completed    Hepatitis C screen  Completed    HIV screen  Completed         Patient presents in office today for follow up after a back surgery on Novemeber 16th. Released from [facility] on November 19th. Notes sent to office. Pain has been manageable with Norco, has only had to take between 1-2 a day since he was released.  No pus from wound but is concerned for infection since he had a bad one in his hip before, he is nervous now. Follow up with surgery tomorrow. Overall feels pretty good. Some hypotension post surgery but it has since resolved. [facility] discontinue blood pressure medications at discharge. No other concerns at this point. Review of Systems   Constitutional: Negative for activity change, appetite change and fever. HENT: Negative for congestion, ear pain, rhinorrhea, sore throat and trouble swallowing. Eyes: Negative for pain, discharge and visual disturbance. Respiratory: Negative for cough, shortness of breath and wheezing. Cardiovascular: Negative for chest pain. Gastrointestinal: Negative for abdominal pain, constipation, diarrhea, nausea and vomiting. Genitourinary: Negative for dysuria. Musculoskeletal: Positive for back pain (recent surgery. ). Negative for gait problem and joint swelling. Skin: Positive for wound (surgical incision, lower back. ). Negative for rash. Neurological: Negative for dizziness, light-headedness and headaches. Psychiatric/Behavioral: Negative for dysphoric mood and sleep disturbance. Objective:   Physical Exam   Constitutional: He is oriented to person, place, and time. He appears well-developed and well-nourished. No distress. HENT:   Mouth/Throat: No posterior oropharyngeal edema or posterior oropharyngeal erythema. Eyes: Pupils are equal, round, and reactive to light. Right eye exhibits no discharge. Left eye exhibits no discharge. Cardiovascular: Normal rate, regular rhythm and normal heart sounds. No murmur heard. Pulses:       Radial pulses are 2+ on the right side, and 2+ on the left side. Posterior tibial pulses are 2+ on the right side, and 2+ on the left side. Pulmonary/Chest: Effort normal and breath sounds normal. No respiratory distress. He has no wheezes. He has no rales. Abdominal: Soft. Bowel sounds are normal. He exhibits no distension. There is no tenderness. There is no guarding. Patient voiced understanding.        PHQ Scores 7/10/2017   PHQ2 Score 1   PHQ9 Score 1     Interpretation of Total Score Depression Severity: 1-4 = Minimal depression, 5-9 = Mild depression, 10-14 = Moderate depression, 15-19 = Moderately severe depression, 20-27 = Severe depression  Normal

## 2017-12-04 ENCOUNTER — CARE COORDINATION (OUTPATIENT)
Dept: CARE COORDINATION | Age: 52
End: 2017-12-04

## 2017-12-04 NOTE — CARE COORDINATION
Ambulatory Care Coordination Note  12/4/2017  CM Risk Score: 5  Williams Mortality Risk Score:      ACC: Mauro Ko RN    Summary Note: spoke with pt who said he is doing ok. He follow up with andry last Tuesday when they went to remove staples there was some fluid so they drained this and said it was spinal fluid and serosanguineous  fluid. He said he felt relief from pain once they drained it. He said the swelling came back and he is going back to [facility] today at 1:00pm. He said he also has a few staples in his back still. He did take his last pain med today. Care Coordination Interventions    Program Enrollment:  Rising Risk  Referral from Primary Care Provider:  No  Suggested Interventions and Community Resources  Medi Set or Pill Pack:  Completed         Goals Addressed             Most Recent     Care Coordination Self Management   On track (12/4/2017)             CC Self Management Goal  Patient Goal (What steps will patient take to achieve goal?): feel better  Patient is able to discuss self-management of condition(s):  Pt demonstrates adherence to medications  Pt demonstrates understanding of self-monitoring  Patient is able to identify Red Flags:  Alert to potential adverse drug reactions(s) or side effects and actions to take should they arise  Discuss target symptoms and actions to take should they arise  Identify problems that require immediate PCP or specialist visit  Patient demonstrates understanding of access to PCP/Specialist:  Understands about scheduling routine Follow Up appointments   Understands about sick day appointment options for worsening of symptoms/progression (Same Day, E Visits)            Prior to Admission medications    Medication Sig Start Date End Date Taking?  Authorizing Provider   tiZANidine (ZANAFLEX) 4 MG tablet Take 4 mg by mouth 11/19/17 12/19/17  Historical Provider, MD   HYDROcodone-acetaminophen (NORCO) 7.5-325 MG per tablet take 1 to 2 tablets by mouth every 6 hours if needed for pain 11/19/17   Historical Provider, MD   metFORMIN (GLUCOPHAGE) 500 MG tablet Take 1 tablet by mouth 2 times daily 11/17/17 11/17/18  Blake Ballesteros MD   DULoxetine (CYMBALTA) 60 MG extended release capsule Take 1 capsule by mouth daily 11/17/17 11/17/18  Balke Ballesteros MD   omeprazole (PRILOSEC OTC) 20 MG tablet Take 1 tablet by mouth daily 11/6/17   Nely Birmingham MD   HYDROmorphone (EXALGO) 12 MG T24A extended release tablet Take 1 tablet by mouth daily . 11/3/17 11/3/18  Blake Ballesteros MD   Cholecalciferol (VITAMIN D3) 5000 units TABS Take 5,000 Units by mouth daily    Historical Provider, MD   vitamin E 400 UNIT capsule Take 400 Units by mouth 2 times daily    Historical Provider, MD   mometasone (NASONEX) 50 MCG/ACT nasal spray 2 sprays by Nasal route daily 10/23/17 12/31/18  Mallorie Pool CNP   ibuprofen (ADVIL;MOTRIN) 800 MG tablet Take 1 tablet by mouth every 8 hours as needed for Pain 10/22/17   Blake Ballesteros MD   gabapentin (NEURONTIN) 800 MG tablet Take 1 tablet by mouth 3 times daily 10/22/17 10/22/18  Blake Ballesteros MD   atorvastatin (LIPITOR) 80 MG tablet Take 1 tablet by mouth daily 10/22/17 10/22/18  Blake Ballesteros MD   Na Sulfate-K Sulfate-Mg Sulf 17.5-3.13-1.6 GM/180ML SOLN Use as directed in your patient instructions. 10/9/17   Nely Birmingham MD   lidocaine (XYLOCAINE) 5 % ointment Apply topically daily as needed.  4/26/17 4/26/18  Mallorie Pool CNP       Future Appointments  Date Time Provider Donovan Peraza   2/20/2018 10:40 AM [facility]   5/21/2018 1:00 PM Jagdeep Thorne MD [facility] [address] [facility]

## 2017-12-08 ENCOUNTER — CARE COORDINATION (OUTPATIENT)
Dept: CARE COORDINATION | Age: 52
End: 2017-12-08

## 2017-12-08 NOTE — CARE COORDINATION
Ambulatory Care Coordination Note  12/8/2017  CM Risk Score: 5  Williams Mortality Risk Score:      ACC: Cristiane Boyd RN    Summary Note: spoke with pt who said he did go to see Dr Noemi Kern on Monday who did not drain off any more fluid from his back. He has to return there next Monday for another check. He feels it is getting a little better. He feels like there is still swelling but better. He said they did take the remaining 2 staples out. They did refill his pain meds too. Care Coordination Interventions    Program Enrollment:  Rising Risk  Referral from Primary Care Provider:  No  Suggested Interventions and Community Resources  Medi Set or Pill Pack:  Completed         Goals Addressed             Most Recent     Care Coordination Self Management   On track (12/8/2017)             CC Self Management Goal  Patient Goal (What steps will patient take to achieve goal?): feel better  Patient is able to discuss self-management of condition(s):  Pt demonstrates adherence to medications  Pt demonstrates understanding of self-monitoring  Patient is able to identify Red Flags:  Alert to potential adverse drug reactions(s) or side effects and actions to take should they arise  Discuss target symptoms and actions to take should they arise  Identify problems that require immediate PCP or specialist visit  Patient demonstrates understanding of access to PCP/Specialist:  Understands about scheduling routine Follow Up appointments   Understands about sick day appointment options for worsening of symptoms/progression (Same Day, E Visits)            Prior to Admission medications    Medication Sig Start Date End Date Taking?  Authorizing Provider   tiZANidine (ZANAFLEX) 4 MG tablet Take 4 mg by mouth 11/19/17 12/19/17  Historical Provider, MD   HYDROcodone-acetaminophen (Arik Gallon) 7.5-325 MG per tablet take 1 to 2 tablets by mouth every 6 hours if needed for pain 11/19/17   Historical Provider, MD   metFORMIN (GLUCOPHAGE) 500 MG tablet Take 1 tablet by mouth 2 times daily 11/17/17 11/17/18  Lily Hinojosa MD   DULoxetine (CYMBALTA) 60 MG extended release capsule Take 1 capsule by mouth daily 11/17/17 11/17/18  Lily Hinojosa MD   omeprazole (PRILOSEC OTC) 20 MG tablet Take 1 tablet by mouth daily 11/6/17   Nely Birmingham MD   HYDROmorphone (EXALGO) 12 MG T24A extended release tablet Take 1 tablet by mouth daily . 11/3/17 11/3/18  Lily Hinojosa MD   Cholecalciferol (VITAMIN D3) 5000 units TABS Take 5,000 Units by mouth daily    Historical Provider, MD   vitamin E 400 UNIT capsule Take 400 Units by mouth 2 times daily    Historical Provider, MD   mometasone (NASONEX) 50 MCG/ACT nasal spray 2 sprays by Nasal route daily 10/23/17 12/31/18  Laith Dent CNP   ibuprofen (ADVIL;MOTRIN) 800 MG tablet Take 1 tablet by mouth every 8 hours as needed for Pain 10/22/17   Lily Hinojosa MD   gabapentin (NEURONTIN) 800 MG tablet Take 1 tablet by mouth 3 times daily 10/22/17 10/22/18  Lily Hinojosa MD   atorvastatin (LIPITOR) 80 MG tablet Take 1 tablet by mouth daily 10/22/17 10/22/18  Lily Hinojosa MD   Na Sulfate-K Sulfate-Mg Sulf 17.5-3.13-1.6 GM/180ML SOLN Use as directed in your patient instructions. 10/9/17   Nely Birmingham MD   lidocaine (XYLOCAINE) 5 % ointment Apply topically daily as needed.  4/26/17 4/26/18  Laith Dent CNP       Future Appointments  Date Time Provider Donovan Peraza   2/20/2018 10:40 AM MD Marcus Maldonado  [facility]   5/21/2018 1:00 PM Montserrat Thomas MD [facility] [address] [facility]

## 2017-12-15 ENCOUNTER — CARE COORDINATION (OUTPATIENT)
Dept: CARE COORDINATION | Age: 52
End: 2017-12-15

## 2017-12-15 RX ORDER — LISINOPRIL 20 MG/1
20 TABLET ORAL DAILY
COMMUNITY
End: 2018-02-20 | Stop reason: SDUPTHER

## 2017-12-15 NOTE — CARE COORDINATION
Ambulatory Care Coordination Note  12/15/2017  CM Risk Score: 5  Williams Mortality Risk Score:      ACC: Susy Briggs RN    Summary Note: spoke with pt who said he is doing ok. His back is stable he did see Dr Munira Duffy this past Monday he still has some swelling and they was to see him back Darion 3. He said his bp have been around 143/80's he is still off the lisinopril and HCTZ he has put some of his bp in [place]. 11/29/2017 12/11/2017 12/12/2017 12/15/2017   Time 12:35 PM 9:03 AM 11:19 AM 52:11 AM   Systolic [phone] (A) 076 (A)   Diastolic 83 89 83 88   Pulse 76 76 77 70           Care Coordination Interventions    Program Enrollment:  Rising Risk  Referral from Primary Care Provider:  No  Suggested Interventions and Community Resources  Medi Set or Pill Pack:  Completed         Goals Addressed     None          Prior to Admission medications    Medication Sig Start Date End Date Taking? Authorizing Provider   tiZANidine (ZANAFLEX) 4 MG tablet Take 4 mg by mouth 11/19/17 12/19/17  Historical Provider, MD   HYDROcodone-acetaminophen (Viraj Littler) 7.5-325 MG per tablet take 1 to 2 tablets by mouth every 6 hours if needed for pain 11/19/17   Historical Provider, MD   metFORMIN (GLUCOPHAGE) 500 MG tablet Take 1 tablet by mouth 2 times daily 11/17/17 11/17/18  Terrell Wall MD   DULoxetine (CYMBALTA) 60 MG extended release capsule Take 1 capsule by mouth daily 11/17/17 11/17/18  Terrell Wall MD   omeprazole (PRILOSEC OTC) 20 MG tablet Take 1 tablet by mouth daily 11/6/17   Nely Birmingham MD   HYDROmorphone (EXALGO) 12 MG T24A extended release tablet Take 1 tablet by mouth daily .  11/3/17 11/3/18  Terrell Wall MD   Cholecalciferol (VITAMIN D3) 5000 units TABS Take 5,000 Units by mouth daily    Historical Provider, MD   vitamin E 400 UNIT capsule Take 400 Units by mouth 2 times daily    Historical Provider, MD   mometasone (NASONEX) 50 MCG/ACT nasal spray 2 sprays by Nasal route daily 10/23/17 12/31/18  Marie Holland

## 2017-12-18 RX ORDER — OMEPRAZOLE 20 MG/1
20 CAPSULE, DELAYED RELEASE ORAL DAILY
Qty: 90 CAPSULE | Refills: 1 | Status: SHIPPED | OUTPATIENT
Start: 2017-12-18 | End: 2018-03-28 | Stop reason: SDUPTHER

## 2017-12-18 RX ORDER — OMEPRAZOLE 20 MG/1
20 TABLET, DELAYED RELEASE ORAL DAILY
Qty: 30 TABLET | Refills: 3 | Status: SHIPPED | OUTPATIENT
Start: 2017-12-18 | End: 2018-02-20 | Stop reason: SDUPTHER

## 2017-12-29 ENCOUNTER — CARE COORDINATION (OUTPATIENT)
Dept: CARE COORDINATION | Age: 52
End: 2017-12-29

## 2017-12-29 NOTE — CARE COORDINATION
Ambulatory Care Coordination Note  12/29/2017  CM Risk Score: 5  Williams Mortality Risk Score:      ACC: Colonel Cabezas, RN    Summary Note: spoke with pt who said he is doing better. His back is improving he had an xray done yesterday at [facility] and will follow up with neurosurgery on [place] 3. He said his bp is better on the Lisinopril and will put some of his reading in [place]. He denies any lightheadedness or dizziness. He denies any needs at this time. Care Coordination Interventions    Program Enrollment:  Rising Risk  Referral from Primary Care Provider:  No  Suggested Interventions and Community Resources  Medi Set or Pill Pack:  Completed         Goals Addressed             Most Recent     Care Coordination Self Management   On track (12/29/2017)             CC Self Management Goal  Patient Goal (What steps will patient take to achieve goal?): feel better  Patient is able to discuss self-management of condition(s):  Pt demonstrates adherence to medications  Pt demonstrates understanding of self-monitoring  Patient is able to identify Red Flags:  Alert to potential adverse drug reactions(s) or side effects and actions to take should they arise  Discuss target symptoms and actions to take should they arise  Identify problems that require immediate PCP or specialist visit  Patient demonstrates understanding of access to PCP/Specialist:  Understands about scheduling routine Follow Up appointments   Understands about sick day appointment options for worsening of symptoms/progression (Same Day, E Visits)            Prior to Admission medications    Medication Sig Start Date End Date Taking?  Authorizing Provider   omeprazole (PRILOSEC) 20 MG delayed release capsule Take 1 capsule by mouth Daily 12/18/17 3/18/18  Nely Birmingham MD   omeprazole (PRILOSEC OTC) 20 MG tablet Take 1 tablet by mouth daily 12/18/17   Nely Birmingham MD   lisinopril (PRINIVIL;ZESTRIL) 20 MG tablet Take 20 mg by mouth daily    Historical

## 2018-01-19 ENCOUNTER — CARE COORDINATION (OUTPATIENT)
Dept: CARE COORDINATION | Age: 53
End: 2018-01-19

## 2018-02-17 DIAGNOSIS — I10 ESSENTIAL HYPERTENSION: ICD-10-CM

## 2018-02-19 ENCOUNTER — CARE COORDINATION (OUTPATIENT)
Dept: CARE COORDINATION | Age: 53
End: 2018-02-19

## 2018-02-19 NOTE — CARE COORDINATION
by mouth Daily 12/18/17 3/18/18  Nely Birmingham MD   omeprazole (PRILOSEC OTC) 20 MG tablet Take 1 tablet by mouth daily 12/18/17   Nely Birmingham MD   lisinopril (PRINIVIL;ZESTRIL) 20 MG tablet Take 20 mg by mouth daily    Historical Provider, MD   HYDROcodone-acetaminophen (Verdis Mends) 7.5-325 MG per tablet take 1 to 2 tablets by mouth every 6 hours if needed for pain 11/19/17   Historical Provider, MD   HYDROmorphone (EXALGO) 12 MG T24A extended release tablet Take 1 tablet by mouth daily . 11/3/17 11/3/18  Carroll Maher MD   Cholecalciferol (VITAMIN D3) 5000 units TABS Take 5,000 Units by mouth daily    Historical Provider, MD   vitamin E 400 UNIT capsule Take 400 Units by mouth 2 times daily    Historical Provider, MD   mometasone (NASONEX) 50 MCG/ACT nasal spray 2 sprays by Nasal route daily 10/23/17 12/31/18  Michele Alba CNP   ibuprofen (ADVIL;MOTRIN) 800 MG tablet Take 1 tablet by mouth every 8 hours as needed for Pain 10/22/17   Carroll Maher MD   gabapentin (NEURONTIN) 800 MG tablet Take 1 tablet by mouth 3 times daily 10/22/17 10/22/18  Carroll Maher MD   atorvastatin (LIPITOR) 80 MG tablet Take 1 tablet by mouth daily 10/22/17 10/22/18  Carroll Maher MD   Na Sulfate-K Sulfate-Mg Sulf 17.5-3.13-1.6 GM/180ML SOLN Use as directed in your patient instructions. 10/9/17   Nely Birmingham MD   lidocaine (XYLOCAINE) 5 % ointment Apply topically daily as needed.  4/26/17 4/26/18  Michele Alba CNP       Future Appointments  Date Time Provider Donovan Peraza   2/20/2018 10:40 AM MD Marcus Whitley  [facility]   5/21/2018 1:00 PM MD alise Steinberg

## 2018-02-20 ENCOUNTER — OFFICE VISIT (OUTPATIENT)
Dept: FAMILY MEDICINE CLINIC | Age: 53
End: 2018-02-20
Payer: MEDICARE

## 2018-02-20 VITALS
WEIGHT: 276.4 LBS | RESPIRATION RATE: 18 BRPM | SYSTOLIC BLOOD PRESSURE: 122 MMHG | DIASTOLIC BLOOD PRESSURE: 80 MMHG | HEIGHT: 76 IN | HEART RATE: 100 BPM | BODY MASS INDEX: 33.66 KG/M2

## 2018-02-20 DIAGNOSIS — E78.00 PURE HYPERCHOLESTEROLEMIA: Primary | ICD-10-CM

## 2018-02-20 DIAGNOSIS — I10 ESSENTIAL HYPERTENSION: ICD-10-CM

## 2018-02-20 DIAGNOSIS — R73.01 IMPAIRED FASTING BLOOD SUGAR: ICD-10-CM

## 2018-02-20 DIAGNOSIS — F33.0 MAJOR DEPRESSIVE DISORDER, RECURRENT EPISODE, MILD (HCC): ICD-10-CM

## 2018-02-20 PROCEDURE — 1036F TOBACCO NON-USER: CPT | Performed by: PEDIATRICS

## 2018-02-20 PROCEDURE — 3017F COLORECTAL CA SCREEN DOC REV: CPT | Performed by: PEDIATRICS

## 2018-02-20 PROCEDURE — 99214 OFFICE O/P EST MOD 30 MIN: CPT | Performed by: PEDIATRICS

## 2018-02-20 PROCEDURE — G8417 CALC BMI ABV UP PARAM F/U: HCPCS | Performed by: PEDIATRICS

## 2018-02-20 PROCEDURE — G8482 FLU IMMUNIZE ORDER/ADMIN: HCPCS | Performed by: PEDIATRICS

## 2018-02-20 PROCEDURE — G8427 DOCREV CUR MEDS BY ELIG CLIN: HCPCS | Performed by: PEDIATRICS

## 2018-02-20 RX ORDER — LISINOPRIL 20 MG/1
20 TABLET ORAL DAILY
Qty: 30 TABLET | Refills: 5 | Status: SHIPPED | OUTPATIENT
Start: 2018-02-20 | End: 2018-08-20 | Stop reason: SDUPTHER

## 2018-02-20 RX ORDER — HYDROCHLOROTHIAZIDE 25 MG/1
1 TABLET ORAL DAILY
Refills: 0 | COMMUNITY
Start: 2018-01-27 | End: 2018-02-20 | Stop reason: ALTCHOICE

## 2018-02-20 RX ORDER — TIZANIDINE 4 MG/1
1 TABLET ORAL EVERY 8 HOURS PRN
COMMUNITY
Start: 2018-01-18 | End: 2018-02-20 | Stop reason: ALTCHOICE

## 2018-02-20 ASSESSMENT — ENCOUNTER SYMPTOMS
GASTROINTESTINAL NEGATIVE: 1
RESPIRATORY NEGATIVE: 1
EYES NEGATIVE: 1

## 2018-02-20 NOTE — PATIENT INSTRUCTIONS
in your blood. It is needed for many body functions, such as making new cells. Cholesterol is made by your body. It also comes from food you eat. High cholesterol means that you have too much of the fat in your blood. This raises your risk of a heart attack and stroke. LDL and HDL are part of your total cholesterol. LDL is the \"bad\" cholesterol. High LDL can raise your risk for heart disease, heart attack, and stroke. HDL is the \"good\" cholesterol. It helps clear bad cholesterol from the body. High HDL is linked with a lower risk of heart disease, heart attack, and stroke. Your cholesterol levels help your doctor find out your risk for having a heart attack or stroke. You and your doctor can talk about whether you need to lower your risk and what treatment is best for you. A heart-healthy lifestyle along with medicines can help lower your cholesterol and your risk. The way you choose to lower your risk will depend on how high your risk is for heart attack and stroke. It will also depend on how you feel about taking medicines. Follow-up care is a key part of your treatment and safety. Be sure to make and go to all appointments, and call your doctor if you are having problems. It's also a good idea to know your test results and keep a list of the medicines you take. How can you care for yourself at home? · Eat a variety of foods every day. Good choices include fruits, vegetables, whole grains (like oatmeal), dried beans and peas, nuts and seeds, soy products (like tofu), and fat-free or low-fat dairy products. · Replace butter, margarine, and hydrogenated or partially hydrogenated oils with olive and canola oils. (Canola oil margarine without trans fat is fine.)  · Replace red meat with fish, poultry, and soy protein (like tofu). · Limit processed and packaged foods like chips, crackers, and cookies. · Bake, broil, or steam foods. Don't crawford them. · Be physically active.  Get at least 30 minutes of exercise on

## 2018-02-20 NOTE — PROGRESS NOTES
11/14/17 118/84     Pulse Readings from Last 3 Encounters:   02/20/18 100   11/27/17 88   11/14/17 72         PHQ-9 Total Score: 1 (7/10/2017 11:13 AM)    Has been doing therapy. Off of pain meds. Now in PT for last 2 weeks. Pain is well controlled. Still some soreness. Working at losing weight. Trying to do more time on treadmill. Strength in legs seem good. Did perform aquatherapy. On omeprazole 20 mg in am for reflux. Still seems some irritability in upper throat. Some acid taste in mouth. No trouble swallowing and food not getting stuck in throat. Building leg strength with therapy. Breathing is good. Doing well recently with diet. No fast foods. More home cooked meals. Has been good for last 6 or 7 weeks. Still on atorvastatin 80 mg. Electronically signed by Keyana Vergara MD on 2/20/2018 at 11:14 AM      Review of Systems   Constitutional: Negative for fatigue and fever. HENT:        Some sinus drainage. Often sleeps with head of bed elevated. Eyes: Negative. Respiratory: Negative. Cardiovascular: Negative. Gastrointestinal: Negative. Still with reflux symptoms. Genitourinary: Negative for dysuria and frequency. Musculoskeletal: Positive for arthralgias. Little back pains. Doing well. Neurological: Negative for numbness. Hematological: Negative. Psychiatric/Behavioral: Negative. Objective:   Physical Exam   Constitutional: He is oriented to person, place, and time. He appears well-developed. No distress. Overweight    HENT:   Head: Normocephalic and atraumatic. Eyes: Conjunctivae are normal. Right eye exhibits no discharge. Left eye exhibits no discharge. Neck: Normal range of motion. Cardiovascular: Normal rate and regular rhythm. Exam reveals distant heart sounds. Pulses:       Carotid pulses are 2+ on the right side, and 2+ on the left side.        Radial pulses are 2+ on the right side, and 2+ on the left side. Posterior tibial pulses are 2+ on the right side, and 2+ on the left side. Pulmonary/Chest: Effort normal and breath sounds normal. No respiratory distress. He has no wheezes. Abdominal: Soft. He exhibits no distension. Protuberant    Musculoskeletal: He exhibits no edema. Neurological: He is alert and oriented to person, place, and time. He exhibits normal muscle tone. Skin: Skin is warm. No erythema. Psychiatric: He has a normal mood and affect. Assessment:        1. Pure hypercholesterolemia  Stable. Continue meds. - Comprehensive Metabolic Panel, Fasting; Future  - Lipid, Fasting; Future  - LDL Cholesterol, Direct; Future  - Hemoglobin A1C; Future    2. Essential hypertension  Stable   Continue meds. - Comprehensive Metabolic Panel, Fasting; Future  - Lipid, Fasting; Future  - LDL Cholesterol, Direct; Future  - Hemoglobin A1C; Future    3. Impaired fasting blood sugar  Stable,. Continue diet and exercise. Monitor.   - Hemoglobin A1C; Future    4. Major depressive disorder, recurrent episode, mild (HCC)  Stable  Continue meds. Monitor. Plan:      Move omeprazole to evening about 30 min minutes before meal.  Call if reflux symptoms persist.    Consider weaning neurontin in the early summer. Fasting labs in end of March  Continue other med. Routine care  Follow up with specialist.   Continue exercise      1. Keshia Jose received counseling on the following healthy behaviors: nutrition, exercise and medication adherence  2. Reviewed prior labs and health maintenance  3. Continue current medications, diet and exercise. 4.  Discussed use, benefit, and side effects of prescribed medications. Barriers to medication compliance addressed. All patient questions answered. Pt voiced understanding. 5.  Patient given educational materials - see patient instructions  6. Was a self-tracking handout given in paper form or via [employer]?  No  If yes, see orders or list here.     PHQ Scores 7/10/2017   PHQ2 Score 1   PHQ9 Score 1     Interpretation of Total Score Depression Severity: 1-4 = Minimal depression, 5-9 = Mild depression, 10-14 = Moderate depression, 15-19 = Moderately severe depression, 20-27 = Severe depression  Normal    Completed Refills   Requested Prescriptions      No prescriptions requested or ordered in this encounter

## 2018-03-19 ENCOUNTER — CARE COORDINATION (OUTPATIENT)
Dept: CARE COORDINATION | Age: 53
End: 2018-03-19

## 2018-03-19 NOTE — CARE COORDINATION
Ambulatory Care Coordination Note  3/19/2018  CM Risk Score: 1  Williams Mortality Risk Score:      ACC: Chris Barnhart RN    Summary Note: spoke with pt who said he is doing great. His pain is gone. He is still going to PT two times a week. He is working on endurance on the treadmill at PT. His bp has been good. He is working on weaning off his gabapentin. reminded him of his blood work due next week. He denies any needs at this time. Care Coordination Interventions    Program Enrollment:  Complex Care  Referral from Primary Care Provider:  No  Suggested Interventions and Community Resources  Medi Set or Pill Pack:  Completed         Goals Addressed     None          Prior to Admission medications    Medication Sig Start Date End Date Taking? Authorizing Provider   lisinopril (PRINIVIL;ZESTRIL) 20 MG tablet Take 1 tablet by mouth daily 2/20/18 2/20/19  Chanelle Schmidt MD   Acetaminophen (TYLENOL PO) Take 1 tablet by mouth See Admin Instructions 2 in the AM 2 at bedtime    Historical Provider, MD   metFORMIN (GLUCOPHAGE) 500 MG tablet Take 1 tablet by mouth 2 times daily (with meals) 1/31/18 1/31/19  Chanelle Schmidt MD   DULoxetine (CYMBALTA) 60 MG extended release capsule Take 1 capsule by mouth daily 1/31/18 1/31/19  Chanelle Schmidt MD   omeprazole (PRILOSEC) 20 MG delayed release capsule Take 1 capsule by mouth Daily 12/18/17 3/18/18  Nely Birmingham MD   Cholecalciferol (VITAMIN D3) 5000 units TABS Take 5,000 Units by mouth daily    Historical Provider, MD   mometasone (NASONEX) 50 MCG/ACT nasal spray 2 sprays by Nasal route daily 10/23/17 12/31/18  Caden Kraus CNP   gabapentin (NEURONTIN) 800 MG tablet Take 1 tablet by mouth 3 times daily 10/22/17 10/22/18  Chanelle Schmidt MD   atorvastatin (LIPITOR) 80 MG tablet Take 1 tablet by mouth daily 10/22/17 10/22/18  Chanelle Schmidt MD   lidocaine (XYLOCAINE) 5 % ointment Apply topically daily as needed.  4/26/17 4/26/18  Caden Kraus CNP       Future Appointments  Date

## 2018-03-22 DIAGNOSIS — M51.26 LUMBAGO DUE TO DISPLACEMENT OF INTERVERTEBRAL DISC: ICD-10-CM

## 2018-03-23 RX ORDER — GABAPENTIN 800 MG/1
800 TABLET ORAL 3 TIMES DAILY
Qty: 90 TABLET | Refills: 2 | Status: SHIPPED | OUTPATIENT
Start: 2018-03-23 | End: 2018-08-06 | Stop reason: SDUPTHER

## 2018-03-26 ENCOUNTER — PATIENT MESSAGE (OUTPATIENT)
Dept: FAMILY MEDICINE CLINIC | Age: 53
End: 2018-03-26

## 2018-03-26 DIAGNOSIS — I10 ESSENTIAL HYPERTENSION: ICD-10-CM

## 2018-03-26 DIAGNOSIS — E78.00 PURE HYPERCHOLESTEROLEMIA: Primary | ICD-10-CM

## 2018-03-28 RX ORDER — OMEPRAZOLE 20 MG/1
20 CAPSULE, DELAYED RELEASE ORAL DAILY
Qty: 90 CAPSULE | Refills: 1 | Status: SHIPPED | OUTPATIENT
Start: 2018-03-28 | End: 2018-05-17

## 2018-03-28 RX ORDER — ATORVASTATIN CALCIUM 80 MG/1
80 TABLET, FILM COATED ORAL DAILY
Qty: 90 TABLET | Refills: 2 | Status: SHIPPED | OUTPATIENT
Start: 2018-03-28 | End: 2018-12-27 | Stop reason: SDUPTHER

## 2018-03-28 RX ORDER — HYDROCHLOROTHIAZIDE 12.5 MG/1
12.5 CAPSULE, GELATIN COATED ORAL DAILY
Qty: 30 CAPSULE | Refills: 1 | Status: SHIPPED | OUTPATIENT
Start: 2018-03-28 | End: 2018-06-13 | Stop reason: SDUPTHER

## 2018-03-28 NOTE — TELEPHONE ENCOUNTER
From: Romie Hernández CNP  To: Olga Doe  Sent: 3/26/2018 11:41 AM EDT  Subject: BP    Hi Neftaly Woodard. Your BP remains running slightly elevated. We do not like to see BP above 140/90. I think it is time we restart your HCTZ, but can try at lower dose first. Are you ok with that?       Yonatan Campbell

## 2018-04-10 ENCOUNTER — HOSPITAL ENCOUNTER (OUTPATIENT)
Age: 53
Setting detail: SPECIMEN
Discharge: HOME | End: 2018-04-10
Payer: MEDICARE

## 2018-04-10 DIAGNOSIS — I10 ESSENTIAL HYPERTENSION: ICD-10-CM

## 2018-04-10 DIAGNOSIS — E78.00 PURE HYPERCHOLESTEROLEMIA: ICD-10-CM

## 2018-04-10 DIAGNOSIS — R73.01 IMPAIRED FASTING BLOOD SUGAR: ICD-10-CM

## 2018-04-10 LAB
ALBUMIN SERPL-MCNC: 4.3 G/DL (ref 3.5–5.2)
ALBUMIN/GLOBULIN RATIO: 1.7 (ref 1–2.5)
ALP BLD-CCNC: 122 U/L (ref 40–129)
ALT SERPL-CCNC: 18 U/L (ref 5–41)
ANION GAP SERPL CALCULATED.3IONS-SCNC: 17 MMOL/L (ref 9–17)
AST SERPL-CCNC: 18 U/L
BILIRUB SERPL-MCNC: 0.81 MG/DL (ref 0.3–1.2)
BUN BLDV-MCNC: 13 MG/DL (ref 6–20)
BUN/CREAT BLD: ABNORMAL (ref 9–20)
CALCIUM SERPL-MCNC: 9.4 MG/DL (ref 8.6–10.4)
CHLORIDE BLD-SCNC: 93 MMOL/L (ref 98–107)
CHOLESTEROL, FASTING: 182 MG/DL
CHOLESTEROL/HDL RATIO: 3.6
CO2: 28 MMOL/L (ref 20–31)
CREAT SERPL-MCNC: 0.95 MG/DL (ref 0.7–1.2)
GFR AFRICAN AMERICAN: >60 ML/MIN
GFR NON-AFRICAN AMERICAN: >60 ML/MIN
GFR SERPL CREATININE-BSD FRML MDRD: ABNORMAL ML/MIN/{1.73_M2}
GFR SERPL CREATININE-BSD FRML MDRD: ABNORMAL ML/MIN/{1.73_M2}
GLUCOSE FASTING: 98 MG/DL (ref 70–99)
HDLC SERPL-MCNC: 51 MG/DL
LDL CHOLESTEROL DIRECT: 98 MG/DL
LDL CHOLESTEROL: 59 MG/DL (ref 0–130)
POTASSIUM SERPL-SCNC: 5 MMOL/L (ref 3.7–5.3)
SODIUM BLD-SCNC: 138 MMOL/L (ref 135–144)
TOTAL PROTEIN: 6.9 G/DL (ref 6.4–8.3)
TRIGLYCERIDE, FASTING: 359 MG/DL
VLDLC SERPL CALC-MCNC: ABNORMAL MG/DL (ref 1–30)

## 2018-04-11 LAB
ESTIMATED AVERAGE GLUCOSE: 126 MG/DL
HBA1C MFR BLD: 6 % (ref 4–6)

## 2018-04-17 DIAGNOSIS — J30.1 ACUTE SEASONAL ALLERGIC RHINITIS DUE TO POLLEN: ICD-10-CM

## 2018-04-17 RX ORDER — MOMETASONE FUROATE 50 UG/1
2 SPRAY, METERED NASAL DAILY
Qty: 1 INHALER | Refills: 2 | Status: SHIPPED | OUTPATIENT
Start: 2018-04-17 | End: 2018-08-20 | Stop reason: SDUPTHER

## 2018-04-19 ENCOUNTER — CARE COORDINATION (OUTPATIENT)
Dept: CARE COORDINATION | Age: 53
End: 2018-04-19

## 2018-05-03 ENCOUNTER — CARE COORDINATION (OUTPATIENT)
Dept: CARE COORDINATION | Age: 53
End: 2018-05-03

## 2018-05-17 ENCOUNTER — OFFICE VISIT (OUTPATIENT)
Dept: GASTROENTEROLOGY | Age: 53
End: 2018-05-17
Payer: MEDICARE

## 2018-05-17 VITALS
OXYGEN SATURATION: 98 % | DIASTOLIC BLOOD PRESSURE: 88 MMHG | BODY MASS INDEX: 33.49 KG/M2 | HEIGHT: 76 IN | SYSTOLIC BLOOD PRESSURE: 138 MMHG | HEART RATE: 95 BPM | WEIGHT: 275 LBS

## 2018-05-17 DIAGNOSIS — K21.9 GASTROESOPHAGEAL REFLUX DISEASE WITHOUT ESOPHAGITIS: Primary | ICD-10-CM

## 2018-05-17 DIAGNOSIS — R13.10 DYSPHAGIA, UNSPECIFIED TYPE: ICD-10-CM

## 2018-05-17 DIAGNOSIS — K57.30 DIVERTICULOSIS OF LARGE INTESTINE WITHOUT HEMORRHAGE: ICD-10-CM

## 2018-05-17 PROCEDURE — G8427 DOCREV CUR MEDS BY ELIG CLIN: HCPCS | Performed by: INTERNAL MEDICINE

## 2018-05-17 PROCEDURE — 1036F TOBACCO NON-USER: CPT | Performed by: INTERNAL MEDICINE

## 2018-05-17 PROCEDURE — 99214 OFFICE O/P EST MOD 30 MIN: CPT | Performed by: INTERNAL MEDICINE

## 2018-05-17 PROCEDURE — 3017F COLORECTAL CA SCREEN DOC REV: CPT | Performed by: INTERNAL MEDICINE

## 2018-05-17 PROCEDURE — G8417 CALC BMI ABV UP PARAM F/U: HCPCS | Performed by: INTERNAL MEDICINE

## 2018-05-17 RX ORDER — CETIRIZINE HYDROCHLORIDE 10 MG/1
10 TABLET ORAL DAILY
COMMUNITY

## 2018-05-17 RX ORDER — RANITIDINE 150 MG/1
150 TABLET ORAL NIGHTLY
Qty: 60 TABLET | Refills: 3 | Status: SHIPPED | OUTPATIENT
Start: 2018-05-17 | End: 2019-01-18 | Stop reason: SDUPTHER

## 2018-05-17 ASSESSMENT — ENCOUNTER SYMPTOMS
BACK PAIN: 0
COUGH: 0
EYES NEGATIVE: 1
RECTAL PAIN: 0
VOMITING: 0
DIARRHEA: 0
ABDOMINAL PAIN: 0
BLOOD IN STOOL: 0
ANAL BLEEDING: 0
CONSTIPATION: 0
ALLERGIC/IMMUNOLOGIC NEGATIVE: 1
ABDOMINAL DISTENTION: 0
TROUBLE SWALLOWING: 1
GASTROINTESTINAL NEGATIVE: 1
SINUS PRESSURE: 0
NAUSEA: 0

## 2018-06-04 ENCOUNTER — CARE COORDINATION (OUTPATIENT)
Dept: CARE COORDINATION | Age: 53
End: 2018-06-04

## 2018-06-13 DIAGNOSIS — I10 ESSENTIAL HYPERTENSION: ICD-10-CM

## 2018-06-15 RX ORDER — HYDROCHLOROTHIAZIDE 25 MG/1
25 TABLET ORAL DAILY
Qty: 90 TABLET | Refills: 1 | OUTPATIENT
Start: 2018-06-15 | End: 2019-06-15

## 2018-07-10 DIAGNOSIS — I10 ESSENTIAL HYPERTENSION: ICD-10-CM

## 2018-07-12 RX ORDER — HYDROCHLOROTHIAZIDE 25 MG/1
TABLET ORAL
Qty: 90 TABLET | Refills: 1 | OUTPATIENT
Start: 2018-07-12

## 2018-07-17 DIAGNOSIS — F33.0 MAJOR DEPRESSIVE DISORDER, RECURRENT EPISODE, MILD (HCC): ICD-10-CM

## 2018-07-17 DIAGNOSIS — M51.26 LUMBAGO DUE TO DISPLACEMENT OF INTERVERTEBRAL DISC: ICD-10-CM

## 2018-07-18 RX ORDER — DULOXETIN HYDROCHLORIDE 60 MG/1
60 CAPSULE, DELAYED RELEASE ORAL DAILY
Qty: 90 CAPSULE | Refills: 0 | Status: SHIPPED | OUTPATIENT
Start: 2018-07-18 | End: 2019-02-25 | Stop reason: SDUPTHER

## 2018-07-18 NOTE — TELEPHONE ENCOUNTER
LOV 2-20-18  LRF 1-31-18  RTO in 6 mos.     Health Maintenance   Topic Date Due    Shingles Vaccine (1 of 2 - 2 Dose Series) 11/06/2013    Flu vaccine (1) 09/01/2018    A1C test (Diabetic or Prediabetic)  04/10/2019    Potassium monitoring  04/10/2019    Creatinine monitoring  04/10/2019    Colon cancer screen colonoscopy  10/11/2022    Lipid screen  04/10/2023    DTaP/Tdap/Td vaccine (2 - Td) 07/12/2026    Hepatitis C screen  Completed    HIV screen  Completed             (applicable per patient's age: Cancer Screenings, Depression Screening, Fall Risk Screening, Immunizations)    Hemoglobin A1C (%)   Date Value   04/10/2018 6.0   07/24/2017 6.2 (H)   01/23/2017 6     LDL Cholesterol (mg/dL)   Date Value   04/10/2018 59     AST (U/L)   Date Value   04/10/2018 18     ALT (U/L)   Date Value   04/10/2018 18     BUN (mg/dL)   Date Value   04/10/2018 13      (goal A1C is < 7)   (goal LDL is <100) need 30-50% reduction from baseline     BP Readings from Last 3 Encounters:   05/17/18 138/88   02/20/18 122/80   11/27/17 130/89    (goal /80)      All Future Testing planned in [facility]:  Lab Frequency Next Occurrence   Iron and TIBC Once 01/09/2018       Next Visit Date:  Future Appointments  Date Time Provider Donovan Peraza   8/20/2018 10:40 AM MD Reynaldo Pablo [facility]            Patient Active Problem List:     Hyperlipidemia     Hypertension     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

## 2018-08-06 DIAGNOSIS — M51.26 LUMBAGO DUE TO DISPLACEMENT OF INTERVERTEBRAL DISC: ICD-10-CM

## 2018-08-06 RX ORDER — GABAPENTIN 800 MG/1
800 TABLET ORAL 3 TIMES DAILY
Qty: 90 TABLET | Refills: 2 | Status: SHIPPED | OUTPATIENT
Start: 2018-08-06 | End: 2019-02-01 | Stop reason: SDUPTHER

## 2018-08-09 DIAGNOSIS — I10 ESSENTIAL HYPERTENSION: ICD-10-CM

## 2018-08-10 RX ORDER — HYDROCHLOROTHIAZIDE 25 MG/1
25 TABLET ORAL DAILY
Qty: 90 TABLET | Refills: 0 | OUTPATIENT
Start: 2018-08-10 | End: 2019-08-10

## 2018-08-10 NOTE — TELEPHONE ENCOUNTER
Changing rx so he could get 90 day supply. But it looks like last refill was just for the 12.5 mg dose. Please verify.

## 2018-08-20 ENCOUNTER — OFFICE VISIT (OUTPATIENT)
Dept: FAMILY MEDICINE CLINIC | Age: 53
End: 2018-08-20
Payer: MEDICARE

## 2018-08-20 VITALS
HEIGHT: 76 IN | RESPIRATION RATE: 16 BRPM | BODY MASS INDEX: 34.1 KG/M2 | WEIGHT: 280 LBS | HEART RATE: 76 BPM | SYSTOLIC BLOOD PRESSURE: 122 MMHG | DIASTOLIC BLOOD PRESSURE: 74 MMHG

## 2018-08-20 DIAGNOSIS — R73.01 IMPAIRED FASTING BLOOD SUGAR: ICD-10-CM

## 2018-08-20 DIAGNOSIS — R53.82 CHRONIC FATIGUE: ICD-10-CM

## 2018-08-20 DIAGNOSIS — E78.00 PURE HYPERCHOLESTEROLEMIA: Primary | ICD-10-CM

## 2018-08-20 DIAGNOSIS — Z13.31 POSITIVE DEPRESSION SCREENING: ICD-10-CM

## 2018-08-20 DIAGNOSIS — I10 ESSENTIAL HYPERTENSION: ICD-10-CM

## 2018-08-20 DIAGNOSIS — K21.9 GASTROESOPHAGEAL REFLUX DISEASE WITHOUT ESOPHAGITIS: ICD-10-CM

## 2018-08-20 DIAGNOSIS — J30.1 ACUTE SEASONAL ALLERGIC RHINITIS DUE TO POLLEN: ICD-10-CM

## 2018-08-20 PROBLEM — R19.5 POSITIVE FIT (FECAL IMMUNOCHEMICAL TEST): Status: RESOLVED | Noted: 2017-10-09 | Resolved: 2018-08-20

## 2018-08-20 PROCEDURE — 3017F COLORECTAL CA SCREEN DOC REV: CPT | Performed by: PEDIATRICS

## 2018-08-20 PROCEDURE — 99214 OFFICE O/P EST MOD 30 MIN: CPT | Performed by: PEDIATRICS

## 2018-08-20 PROCEDURE — 1036F TOBACCO NON-USER: CPT | Performed by: PEDIATRICS

## 2018-08-20 PROCEDURE — G8431 POS CLIN DEPRES SCRN F/U DOC: HCPCS | Performed by: PEDIATRICS

## 2018-08-20 PROCEDURE — G0444 DEPRESSION SCREEN ANNUAL: HCPCS | Performed by: PEDIATRICS

## 2018-08-20 PROCEDURE — G8427 DOCREV CUR MEDS BY ELIG CLIN: HCPCS | Performed by: PEDIATRICS

## 2018-08-20 PROCEDURE — G8417 CALC BMI ABV UP PARAM F/U: HCPCS | Performed by: PEDIATRICS

## 2018-08-20 RX ORDER — MOMETASONE FUROATE 50 UG/1
2 SPRAY, METERED NASAL DAILY PRN
Qty: 1 INHALER | Refills: 5 | Status: SHIPPED | OUTPATIENT
Start: 2018-08-20 | End: 2019-09-16 | Stop reason: SDUPTHER

## 2018-08-20 ASSESSMENT — PATIENT HEALTH QUESTIONNAIRE - PHQ9
2. FEELING DOWN, DEPRESSED OR HOPELESS: 0
SUM OF ALL RESPONSES TO PHQ QUESTIONS 1-9: 8
5. POOR APPETITE OR OVEREATING: 0
1. LITTLE INTEREST OR PLEASURE IN DOING THINGS: 3
10. IF YOU CHECKED OFF ANY PROBLEMS, HOW DIFFICULT HAVE THESE PROBLEMS MADE IT FOR YOU TO DO YOUR WORK, TAKE CARE OF THINGS AT HOME, OR GET ALONG WITH OTHER PEOPLE: 1
7. TROUBLE CONCENTRATING ON THINGS, SUCH AS READING THE NEWSPAPER OR WATCHING TELEVISION: 0
9. THOUGHTS THAT YOU WOULD BE BETTER OFF DEAD, OR OF HURTING YOURSELF: 0
8. MOVING OR SPEAKING SO SLOWLY THAT OTHER PEOPLE COULD HAVE NOTICED. OR THE OPPOSITE, BEING SO FIGETY OR RESTLESS THAT YOU HAVE BEEN MOVING AROUND A LOT MORE THAN USUAL: 0
3. TROUBLE FALLING OR STAYING ASLEEP: 1
4. FEELING TIRED OR HAVING LITTLE ENERGY: 3
SUM OF ALL RESPONSES TO PHQ QUESTIONS 1-9: 8
6. FEELING BAD ABOUT YOURSELF - OR THAT YOU ARE A FAILURE OR HAVE LET YOURSELF OR YOUR FAMILY DOWN: 1
SUM OF ALL RESPONSES TO PHQ9 QUESTIONS 1 & 2: 3

## 2018-08-20 ASSESSMENT — ENCOUNTER SYMPTOMS
CONSTIPATION: 0
RHINORRHEA: 0
DIARRHEA: 0
STRIDOR: 0
CHOKING: 0
SHORTNESS OF BREATH: 0

## 2018-08-20 NOTE — TELEPHONE ENCOUNTER
LOV 8-20-18  LRF 2-20-18 # 30 with 5 refills    Health Maintenance   Topic Date Due    Shingles Vaccine (1 of 2 - 2 Dose Series) 11/06/2013    Flu vaccine (1) 09/01/2018    A1C test (Diabetic or Prediabetic)  04/10/2019    Potassium monitoring  04/10/2019    Creatinine monitoring  04/10/2019    Colon cancer screen colonoscopy  10/11/2022    Lipid screen  04/10/2023    DTaP/Tdap/Td vaccine (2 - Td) 07/12/2026    Hepatitis C screen  Completed    HIV screen  Completed             (applicable per patient's age: Cancer Screenings, Depression Screening, Fall Risk Screening, Immunizations)    Hemoglobin A1C (%)   Date Value   04/10/2018 6.0   07/24/2017 6.2 (H)   01/23/2017 6     LDL Cholesterol (mg/dL)   Date Value   04/10/2018 59     AST (U/L)   Date Value   04/10/2018 18     ALT (U/L)   Date Value   04/10/2018 18     BUN (mg/dL)   Date Value   04/10/2018 13      (goal A1C is < 7)   (goal LDL is <100) need 30-50% reduction from baseline     BP Readings from Last 3 Encounters:   08/20/18 122/74   05/17/18 138/88   02/20/18 122/80    (goal /80)      All Future Testing planned in [facility]:  Lab Frequency Next Occurrence       Next Visit Date:  Future Appointments  Date Time Provider Donovan Peraza   11/19/2018 9:00 AM Zoltan Nicole [facility] [address] [facility]            Patient Active Problem List:     Hyperlipidemia     Hypertension     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

## 2018-08-20 NOTE — PROGRESS NOTES
Subjective:      Patient ID: Chinyere Mandel is a 45 y.o. male. Visit Information    Have you changed or started any medications since your last visit including any over-the-counter medicines, vitamins, or herbal medicines? yes - med list updated   Are you having any side effects from any of your medications? -  no  Have you stopped taking any of your medications? Is so, why? -  yes - med list updated    Have you seen any other physician or provider since your last visit? Yes - Records Obtained  Have you had any other diagnostic tests since your last visit? Yes - Records Obtained  Have you been seen in the emergency room and/or had an admission to a hospital since we last saw you? No  Have you had your routine dental cleaning in the past 6 months? no    Have you activated your [employer] account? If not, what are your barriers?  Yes     Patient Care Team:  Waqas Starks MD as PCP - General (Internal Medicine/Pediatrics)  CARLOS Arguello CNP as PCP - S Attributed Provider  Bozena Dumont MD as Consulting Physician (Infectious Diseases)  Marita Barton MD as Consulting Physician (Pulmonary Disease)  Niall Gallegos MD as Consulting Physician (Anesthesiology)    Medical History Review  Past Medical, Family, and Social History reviewed and does contribute to the patient presenting condition    Health Maintenance   Topic Date Due    Shingles Vaccine (1 of 2 - 2 Dose Series) 11/06/2013    Flu vaccine (1) 09/01/2018    A1C test (Diabetic or Prediabetic)  04/10/2019    Potassium monitoring  04/10/2019    Creatinine monitoring  04/10/2019    Colon cancer screen colonoscopy  10/11/2022    Lipid screen  04/10/2023    DTaP/Tdap/Td vaccine (2 - Td) 07/12/2026    Hepatitis C screen  Completed    HIV screen  Completed       HPI     Chief Complaint   Patient presents with    Hyperlipidemia    Hypertension       Wt Readings from Last 3 Encounters:   08/20/18 280 lb (127 kg)   05/17/18 275 lb (124.7 kg) Normocephalic. Eyes: Conjunctivae are normal. Right eye exhibits no discharge. Left eye exhibits no discharge. Neck: No thyromegaly present. Cardiovascular: Normal rate and regular rhythm. Exam reveals distant heart sounds. No murmur heard. Pulses:       Carotid pulses are 2+ on the right side, and 2+ on the left side. Radial pulses are 2+ on the right side, and 2+ on the left side. Pulmonary/Chest: Effort normal and breath sounds normal. No respiratory distress. He has no wheezes. Abdominal: Soft. He exhibits no distension. There is no tenderness. Protuberant    Musculoskeletal: He exhibits edema (trace). No red or swollen joint. Lymphadenopathy:     He has no cervical adenopathy. Neurological: He is alert and oriented to person, place, and time. He exhibits normal muscle tone. Skin: Skin is warm. About 1.5 cm circular skin lesion on right upper inner calf. Dark red in color. No drainage. Psychiatric: He has a normal mood and affect. His behavior is normal.       Assessment:       Diagnosis Orders   1. Pure hypercholesterolemia     2. Impaired fasting blood sugar     3. Essential hypertension     4. Gastroesophageal reflux disease without esophagitis     5. Chronic fatigue     6. Acute seasonal allergic rhinitis due to pollen  mometasone (NASONEX) 50 MCG/ACT nasal spray   7. Positive depression screening  Positive Screen for Clinical Depression with a Documented Follow-up Plan            Plan:      Decrease am gabapentin to just 1/2 tablet. Continue the 800 mg at night    Monitor fatigue. Monitor mood. Start b complex vitamin with folic acid. Trial of Lotrisone for right leg rash. Apply until gone. Resume nasal steroid  Continue other. Consider change zantac 150 to just using nexium twice daily. Repeat fasting labs in 6 months, consider checking testosterone  Consider evaluation for sleep apnea.          Rojelio Sloan MD    On the basis of positive PHQ-9 screening (PHQ-9 Total Score: 8), the following plan was implemented: still with increased symptoms. Monitor with med changes. Patient will follow-up in 3 month(s) with PCP. 1.  Hever Quintana received counseling on the following healthy behaviors: nutrition, exercise and medication adherence  2. Reviewed prior labs and health maintenance  3. Continue current medications, diet and exercise. 4.  Discussed use, benefit, and side effects of prescribed medications. Barriers to medication compliance addressed. All patient questions answered. Pt voiced understanding. 5.  Patient given educational materials - see patient instructions  6. Was a self-tracking handout given in paper form or via [employer]? No  If yes, see orders or list here. PHQ Scores 2018 7/10/2017   PHQ2 Score 3 1   PHQ9 Score 8 1     Interpretation of Total Score Depression Severity: 1-4 = Minimal depression, 5-9 = Mild depression, 10-14 = Moderate depression, 15-19 = Moderately severe depression, 20-27 = Severe depression  - increased symptoms - persists - follow after med changes.      Completed Refills   Requested Prescriptions     Signed Prescriptions Disp Refills    mometasone (NASONEX) 50 MCG/ACT nasal spray 1 Inhaler 5     Si sprays by Nasal route daily as needed (allergies)    esomeprazole (NEXIUM) 20 MG delayed release capsule 30 capsule 3     Sig: Take 1 capsule by mouth every morning (before breakfast)

## 2018-08-20 NOTE — PATIENT INSTRUCTIONS
Today's office visit was for the following diagnosis    ICD-10-CM ICD-9-CM    1. Pure hypercholesterolemia E78.00 272.0    2. Impaired fasting blood sugar R73.01 790.21    3. Essential hypertension I10 401.9    4. Gastroesophageal reflux disease without esophagitis K21.9 530.81    5. Chronic fatigue R53.82 780.79    6. Acute seasonal allergic rhinitis due to pollen J30.1 477.0 mometasone (NASONEX) 50 MCG/ACT nasal spray       The treatment plan consists of the following     Decrease am gabapentin to just 1/2 tablet. Continue the 800 mg at night    Monitor fatigue. Monitor mood. Start b complex vitamin with folic acid. Trial of Lotrisone for right leg rash. Apply until gone. Resume nasal steroid  Continue other. Consider change zantac 150 to just using nexium twice daily. Repeat fasting labs in 6 months, consider checking testosterone  Consider evaluation for sleep apnea. [facility]  Lab Frequency Next Occurrence       Survey of office experience to help improve our quality of service. Please note that you may receive a patient satisfaction survey either by [facility] postal mail service or email. We would appreciate you taking the time to complete and return the survey. We value your opinion and will use your comments to help improve our care and  service to our patients    Health Maintenance  Please also note the list of Health maintenance items for you and their due dates. Help us continue to provide excellent health care by keeping these items up to date. We will be happy to assist you in getting this completed in a timely fashion.    Health Maintenance Due   Topic Date Due    Shingles Vaccine (1 of 2 - 2 Dose Series) 11/06/2013         After-Hours Urgent [address] -  24 hours emergency services daily  [facility].      Patient Education        Gastroesophageal Reflux Disease (GERD): Care Instructions  Your Care fatigue depends on the cause. For example, if you have fatigue because you have a certain health problem, treating this problem also treats your fatigue. If depression or anxiety is the cause, treatment may help. Follow-up care is a key part of your treatment and safety. Be sure to make and go to all appointments, and call your doctor if you are having problems. It's also a good idea to know your test results and keep a list of the medicines you take. How can you care for yourself at home? · Get regular exercise. But don't overdo it. Go back and forth between rest and exercise. · Get plenty of rest.  · Eat a healthy diet. Do not skip meals, especially breakfast.  · Reduce your use of caffeine, tobacco, and alcohol. Caffeine is most often found in coffee, tea, cola drinks, and chocolate. · Limit medicines that can cause fatigue. This includes tranquilizers and cold and allergy medicines. When should you call for help? Watch closely for changes in your health, and be sure to contact your doctor if:    · You have new symptoms such as fever or a rash.     · Your fatigue gets worse.     · You have been feeling down, depressed, or hopeless. Or you may have lost interest in things that you usually enjoy.     · You are not getting better as expected. Where can you learn more? Go to [email or web address] and sign in to your [employer] account. Enter Q224 in the [facility] box to learn more about \"Fatigue: Care Instructions. \"     If you do not have an account, please click on the \"Sign Up Now\" link. Current as of: November 20, 2017  Content Version: 11.7  © 5995-5567 [employer]. Care instructions adapted under license by [facility] ([facility]). If you have questions about a medical condition or this instruction, always ask your healthcare professional. [address] any warranty or liability for your use of this information.

## 2018-08-21 RX ORDER — LISINOPRIL 20 MG/1
20 TABLET ORAL DAILY
Qty: 90 TABLET | Refills: 1 | Status: SHIPPED | OUTPATIENT
Start: 2018-08-21 | End: 2019-08-16

## 2018-11-19 ENCOUNTER — OFFICE VISIT (OUTPATIENT)
Dept: FAMILY MEDICINE CLINIC | Age: 53
End: 2018-11-19
Payer: MEDICARE

## 2018-11-19 ENCOUNTER — HOSPITAL ENCOUNTER (OUTPATIENT)
Age: 53
Setting detail: SPECIMEN
Discharge: HOME | End: 2018-11-19
Payer: MEDICARE

## 2018-11-19 VITALS
TEMPERATURE: 99.4 F | BODY MASS INDEX: 33.6 KG/M2 | DIASTOLIC BLOOD PRESSURE: 86 MMHG | WEIGHT: 276 LBS | SYSTOLIC BLOOD PRESSURE: 136 MMHG | HEART RATE: 80 BPM

## 2018-11-19 DIAGNOSIS — R53.82 CHRONIC FATIGUE: ICD-10-CM

## 2018-11-19 DIAGNOSIS — E53.8 B12 DEFICIENCY: ICD-10-CM

## 2018-11-19 DIAGNOSIS — I10 ESSENTIAL HYPERTENSION: ICD-10-CM

## 2018-11-19 DIAGNOSIS — R73.01 IMPAIRED FASTING BLOOD SUGAR: ICD-10-CM

## 2018-11-19 DIAGNOSIS — F33.0 MAJOR DEPRESSIVE DISORDER, RECURRENT EPISODE, MILD (HCC): Primary | ICD-10-CM

## 2018-11-19 DIAGNOSIS — F33.0 MAJOR DEPRESSIVE DISORDER, RECURRENT EPISODE, MILD (HCC): ICD-10-CM

## 2018-11-19 LAB
ALBUMIN SERPL-MCNC: 4.5 G/DL (ref 3.5–5.2)
ALBUMIN/GLOBULIN RATIO: 1.5 (ref 1–2.5)
ALP BLD-CCNC: 127 U/L (ref 40–129)
ALT SERPL-CCNC: 18 U/L (ref 5–41)
ANION GAP SERPL CALCULATED.3IONS-SCNC: 17 MMOL/L (ref 9–17)
AST SERPL-CCNC: 21 U/L
BILIRUB SERPL-MCNC: 0.51 MG/DL (ref 0.3–1.2)
BUN BLDV-MCNC: 10 MG/DL (ref 6–20)
BUN/CREAT BLD: ABNORMAL (ref 9–20)
CALCIUM SERPL-MCNC: 9.4 MG/DL (ref 8.6–10.4)
CHLORIDE BLD-SCNC: 94 MMOL/L (ref 98–107)
CO2: 27 MMOL/L (ref 20–31)
CREAT SERPL-MCNC: 0.82 MG/DL (ref 0.7–1.2)
ESTIMATED AVERAGE GLUCOSE: 120 MG/DL
GFR AFRICAN AMERICAN: >60 ML/MIN
GFR NON-AFRICAN AMERICAN: >60 ML/MIN
GFR SERPL CREATININE-BSD FRML MDRD: ABNORMAL ML/MIN/{1.73_M2}
GFR SERPL CREATININE-BSD FRML MDRD: ABNORMAL ML/MIN/{1.73_M2}
GLUCOSE BLD-MCNC: 85 MG/DL (ref 70–99)
HBA1C MFR BLD: 5.8 % (ref 4–6)
POTASSIUM SERPL-SCNC: 4.7 MMOL/L (ref 3.7–5.3)
SODIUM BLD-SCNC: 138 MMOL/L (ref 135–144)
TESTOSTERONE TOTAL: 245 NG/DL (ref 220–1000)
TOTAL PROTEIN: 7.5 G/DL (ref 6.4–8.3)
TSH SERPL DL<=0.05 MIU/L-ACNC: 2.91 MIU/L (ref 0.3–5)
VITAMIN B-12: 1178 PG/ML (ref 232–1245)

## 2018-11-19 PROCEDURE — 99214 OFFICE O/P EST MOD 30 MIN: CPT | Performed by: PEDIATRICS

## 2018-11-19 PROCEDURE — 1036F TOBACCO NON-USER: CPT | Performed by: PEDIATRICS

## 2018-11-19 PROCEDURE — G8427 DOCREV CUR MEDS BY ELIG CLIN: HCPCS | Performed by: PEDIATRICS

## 2018-11-19 PROCEDURE — G8417 CALC BMI ABV UP PARAM F/U: HCPCS | Performed by: PEDIATRICS

## 2018-11-19 PROCEDURE — G8484 FLU IMMUNIZE NO ADMIN: HCPCS | Performed by: PEDIATRICS

## 2018-11-19 PROCEDURE — 3017F COLORECTAL CA SCREEN DOC REV: CPT | Performed by: PEDIATRICS

## 2018-11-19 RX ORDER — LANOLIN ALCOHOL/MO/W.PET/CERES
1000 CREAM (GRAM) TOPICAL DAILY
COMMUNITY
End: 2021-10-27

## 2018-11-19 ASSESSMENT — ENCOUNTER SYMPTOMS
DIARRHEA: 0
BACK PAIN: 1
COUGH: 1
CONSTIPATION: 0
NAUSEA: 0
SHORTNESS OF BREATH: 0

## 2018-11-23 ENCOUNTER — TELEPHONE (OUTPATIENT)
Dept: FAMILY MEDICINE CLINIC | Age: 53
End: 2018-11-23

## 2018-11-23 DIAGNOSIS — R79.89 LOW TESTOSTERONE: Primary | ICD-10-CM

## 2018-11-23 RX ORDER — TESTOSTERONE 16.2 MG/G
2 GEL TRANSDERMAL DAILY
Qty: 2 BOTTLE | Refills: 1 | Status: SHIPPED | OUTPATIENT
Start: 2018-11-23 | End: 2019-03-19 | Stop reason: CLARIF

## 2018-12-18 DIAGNOSIS — I10 ESSENTIAL HYPERTENSION: ICD-10-CM

## 2018-12-19 RX ORDER — HYDROCHLOROTHIAZIDE 12.5 MG/1
12.5 CAPSULE, GELATIN COATED ORAL EVERY MORNING
Qty: 30 CAPSULE | Refills: 5 | Status: SHIPPED | OUTPATIENT
Start: 2018-12-19 | End: 2019-06-22 | Stop reason: SDUPTHER

## 2018-12-27 DIAGNOSIS — E78.00 PURE HYPERCHOLESTEROLEMIA: ICD-10-CM

## 2018-12-27 RX ORDER — ATORVASTATIN CALCIUM 80 MG/1
80 TABLET, FILM COATED ORAL DAILY
Qty: 90 TABLET | Refills: 2 | Status: SHIPPED | OUTPATIENT
Start: 2018-12-27 | End: 2019-10-01 | Stop reason: SDUPTHER

## 2019-01-09 ENCOUNTER — PATIENT MESSAGE (OUTPATIENT)
Dept: FAMILY MEDICINE CLINIC | Age: 54
End: 2019-01-09

## 2019-01-22 RX ORDER — RANITIDINE 150 MG/1
TABLET ORAL
Qty: 60 TABLET | Refills: 3 | Status: SHIPPED | OUTPATIENT
Start: 2019-01-22 | End: 2019-09-22 | Stop reason: SDUPTHER

## 2019-02-01 DIAGNOSIS — M51.26 LUMBAGO DUE TO DISPLACEMENT OF INTERVERTEBRAL DISC: ICD-10-CM

## 2019-02-02 RX ORDER — GABAPENTIN 800 MG/1
800 TABLET ORAL 2 TIMES DAILY
Qty: 60 TABLET | Refills: 2 | Status: SHIPPED | OUTPATIENT
Start: 2019-02-02 | End: 2019-09-16

## 2019-02-13 DIAGNOSIS — K21.9 GASTROESOPHAGEAL REFLUX DISEASE WITHOUT ESOPHAGITIS: Primary | ICD-10-CM

## 2019-02-25 DIAGNOSIS — F33.0 MAJOR DEPRESSIVE DISORDER, RECURRENT EPISODE, MILD (HCC): ICD-10-CM

## 2019-02-25 DIAGNOSIS — M51.26 LUMBAGO DUE TO DISPLACEMENT OF INTERVERTEBRAL DISC: ICD-10-CM

## 2019-02-26 RX ORDER — DULOXETIN HYDROCHLORIDE 60 MG/1
60 CAPSULE, DELAYED RELEASE ORAL DAILY
Qty: 90 CAPSULE | Refills: 0 | Status: SHIPPED | OUTPATIENT
Start: 2019-02-26 | End: 2019-05-28 | Stop reason: SDUPTHER

## 2019-03-19 ENCOUNTER — OFFICE VISIT (OUTPATIENT)
Dept: FAMILY MEDICINE CLINIC | Age: 54
End: 2019-03-19
Payer: MEDICARE

## 2019-03-19 VITALS
RESPIRATION RATE: 18 BRPM | DIASTOLIC BLOOD PRESSURE: 82 MMHG | BODY MASS INDEX: 33.11 KG/M2 | SYSTOLIC BLOOD PRESSURE: 136 MMHG | TEMPERATURE: 99.3 F | HEART RATE: 82 BPM | WEIGHT: 272 LBS

## 2019-03-19 DIAGNOSIS — E78.00 PURE HYPERCHOLESTEROLEMIA: Primary | ICD-10-CM

## 2019-03-19 DIAGNOSIS — R73.01 IMPAIRED FASTING BLOOD SUGAR: ICD-10-CM

## 2019-03-19 DIAGNOSIS — Z12.5 PROSTATE CANCER SCREENING: ICD-10-CM

## 2019-03-19 DIAGNOSIS — I10 ESSENTIAL HYPERTENSION: ICD-10-CM

## 2019-03-19 DIAGNOSIS — E66.9 OBESITY (BMI 30.0-34.9): ICD-10-CM

## 2019-03-19 DIAGNOSIS — F33.0 MAJOR DEPRESSIVE DISORDER, RECURRENT EPISODE, MILD (HCC): ICD-10-CM

## 2019-03-19 PROCEDURE — 3017F COLORECTAL CA SCREEN DOC REV: CPT | Performed by: PEDIATRICS

## 2019-03-19 PROCEDURE — G0008 ADMIN INFLUENZA VIRUS VAC: HCPCS | Performed by: PEDIATRICS

## 2019-03-19 PROCEDURE — 90756 CCIIV4 VACC ABX FREE IM: CPT | Performed by: PEDIATRICS

## 2019-03-19 PROCEDURE — 1036F TOBACCO NON-USER: CPT | Performed by: PEDIATRICS

## 2019-03-19 PROCEDURE — G8482 FLU IMMUNIZE ORDER/ADMIN: HCPCS | Performed by: PEDIATRICS

## 2019-03-19 PROCEDURE — G8427 DOCREV CUR MEDS BY ELIG CLIN: HCPCS | Performed by: PEDIATRICS

## 2019-03-19 PROCEDURE — G8417 CALC BMI ABV UP PARAM F/U: HCPCS | Performed by: PEDIATRICS

## 2019-03-19 PROCEDURE — 99214 OFFICE O/P EST MOD 30 MIN: CPT | Performed by: PEDIATRICS

## 2019-03-19 ASSESSMENT — ENCOUNTER SYMPTOMS
BACK PAIN: 1
CONSTIPATION: 0
DIARRHEA: 0
BLOOD IN STOOL: 0
COUGH: 0
SHORTNESS OF BREATH: 0
EYES NEGATIVE: 1

## 2019-03-21 PROBLEM — E66.9 OBESITY (BMI 30.0-34.9): Status: ACTIVE | Noted: 2019-03-21

## 2019-03-21 PROBLEM — E66.811 OBESITY (BMI 30.0-34.9): Status: ACTIVE | Noted: 2019-03-21

## 2019-06-21 DIAGNOSIS — I10 ESSENTIAL HYPERTENSION: ICD-10-CM

## 2019-06-21 DIAGNOSIS — K21.9 GASTROESOPHAGEAL REFLUX DISEASE WITHOUT ESOPHAGITIS: ICD-10-CM

## 2019-06-22 RX ORDER — HYDROCHLOROTHIAZIDE 12.5 MG/1
12.5 CAPSULE, GELATIN COATED ORAL EVERY MORNING
Qty: 30 CAPSULE | Refills: 5 | Status: SHIPPED | OUTPATIENT
Start: 2019-06-22 | End: 2019-12-20

## 2019-07-26 RX ORDER — IBUPROFEN 800 MG/1
800 TABLET ORAL EVERY 8 HOURS PRN
Qty: 270 TABLET | Refills: 1 | Status: SHIPPED | OUTPATIENT
Start: 2019-07-26 | End: 2020-01-03

## 2019-08-29 DIAGNOSIS — R73.01 IMPAIRED FASTING BLOOD SUGAR: Primary | ICD-10-CM

## 2019-09-11 ENCOUNTER — HOSPITAL ENCOUNTER (OUTPATIENT)
Age: 54
Setting detail: SPECIMEN
Discharge: HOME | End: 2019-09-11
Payer: MEDICARE

## 2019-09-11 DIAGNOSIS — R79.89 LOW TESTOSTERONE: ICD-10-CM

## 2019-09-11 DIAGNOSIS — R73.01 IMPAIRED FASTING BLOOD SUGAR: ICD-10-CM

## 2019-09-11 LAB
ALBUMIN SERPL-MCNC: 4.5 G/DL (ref 3.5–5.2)
ALBUMIN/GLOBULIN RATIO: 1.6 (ref 1–2.5)
ALP BLD-CCNC: 111 U/L (ref 40–129)
ALT SERPL-CCNC: 15 U/L (ref 5–41)
ANION GAP SERPL CALCULATED.3IONS-SCNC: 12 MMOL/L (ref 9–17)
AST SERPL-CCNC: 18 U/L
BILIRUB SERPL-MCNC: 0.46 MG/DL (ref 0.3–1.2)
BUN BLDV-MCNC: 12 MG/DL (ref 6–20)
BUN/CREAT BLD: ABNORMAL (ref 9–20)
CALCIUM SERPL-MCNC: 10 MG/DL (ref 8.6–10.4)
CHLORIDE BLD-SCNC: 95 MMOL/L (ref 98–107)
CHOLESTEROL, FASTING: 156 MG/DL
CHOLESTEROL/HDL RATIO: 2.4
CO2: 29 MMOL/L (ref 20–31)
CREAT SERPL-MCNC: 0.89 MG/DL (ref 0.7–1.2)
GFR AFRICAN AMERICAN: >60 ML/MIN
GFR NON-AFRICAN AMERICAN: >60 ML/MIN
GFR SERPL CREATININE-BSD FRML MDRD: ABNORMAL ML/MIN/{1.73_M2}
GFR SERPL CREATININE-BSD FRML MDRD: ABNORMAL ML/MIN/{1.73_M2}
GLUCOSE FASTING: 95 MG/DL (ref 70–99)
HDLC SERPL-MCNC: 64 MG/DL
LDL CHOLESTEROL: 68 MG/DL (ref 0–130)
POTASSIUM SERPL-SCNC: 5 MMOL/L (ref 3.7–5.3)
PROSTATE SPECIFIC ANTIGEN: 0.43 UG/L
SODIUM BLD-SCNC: 136 MMOL/L (ref 135–144)
TESTOSTERONE TOTAL: 242 NG/DL (ref 220–1000)
TOTAL PROTEIN: 7.3 G/DL (ref 6.4–8.3)
TRIGLYCERIDE, FASTING: 119 MG/DL
VLDLC SERPL CALC-MCNC: NORMAL MG/DL (ref 1–30)

## 2019-09-12 LAB
ESTIMATED AVERAGE GLUCOSE: 117 MG/DL
HBA1C MFR BLD: 5.7 % (ref 4–6)

## 2019-09-16 ENCOUNTER — OFFICE VISIT (OUTPATIENT)
Dept: FAMILY MEDICINE CLINIC | Age: 54
End: 2019-09-16
Payer: MEDICARE

## 2019-09-16 VITALS
WEIGHT: 270 LBS | SYSTOLIC BLOOD PRESSURE: 170 MMHG | HEART RATE: 86 BPM | BODY MASS INDEX: 31.88 KG/M2 | DIASTOLIC BLOOD PRESSURE: 106 MMHG | HEIGHT: 77 IN

## 2019-09-16 DIAGNOSIS — J30.1 ACUTE SEASONAL ALLERGIC RHINITIS DUE TO POLLEN: ICD-10-CM

## 2019-09-16 DIAGNOSIS — E78.00 PURE HYPERCHOLESTEROLEMIA: ICD-10-CM

## 2019-09-16 DIAGNOSIS — K21.9 GASTROESOPHAGEAL REFLUX DISEASE WITHOUT ESOPHAGITIS: ICD-10-CM

## 2019-09-16 DIAGNOSIS — M51.26 LUMBAGO DUE TO DISPLACEMENT OF INTERVERTEBRAL DISC: ICD-10-CM

## 2019-09-16 DIAGNOSIS — R13.10 DYSPHAGIA, UNSPECIFIED TYPE: ICD-10-CM

## 2019-09-16 DIAGNOSIS — Z23 NEED FOR INFLUENZA VACCINATION: ICD-10-CM

## 2019-09-16 DIAGNOSIS — F33.0 MAJOR DEPRESSIVE DISORDER, RECURRENT EPISODE, MILD (HCC): ICD-10-CM

## 2019-09-16 DIAGNOSIS — I10 ESSENTIAL HYPERTENSION: Primary | ICD-10-CM

## 2019-09-16 PROCEDURE — 99214 OFFICE O/P EST MOD 30 MIN: CPT | Performed by: NURSE PRACTITIONER

## 2019-09-16 PROCEDURE — G8417 CALC BMI ABV UP PARAM F/U: HCPCS | Performed by: NURSE PRACTITIONER

## 2019-09-16 PROCEDURE — 90686 IIV4 VACC NO PRSV 0.5 ML IM: CPT | Performed by: NURSE PRACTITIONER

## 2019-09-16 PROCEDURE — G0008 ADMIN INFLUENZA VIRUS VAC: HCPCS | Performed by: NURSE PRACTITIONER

## 2019-09-16 PROCEDURE — 1036F TOBACCO NON-USER: CPT | Performed by: NURSE PRACTITIONER

## 2019-09-16 PROCEDURE — 3017F COLORECTAL CA SCREEN DOC REV: CPT | Performed by: NURSE PRACTITIONER

## 2019-09-16 PROCEDURE — G8427 DOCREV CUR MEDS BY ELIG CLIN: HCPCS | Performed by: NURSE PRACTITIONER

## 2019-09-16 RX ORDER — FAMOTIDINE 20 MG/1
20 TABLET, FILM COATED ORAL 2 TIMES DAILY
COMMUNITY
End: 2020-10-27

## 2019-09-16 RX ORDER — LOSARTAN POTASSIUM 50 MG/1
50 TABLET ORAL DAILY
Qty: 30 TABLET | Refills: 2 | Status: SHIPPED | OUTPATIENT
Start: 2019-09-16 | End: 2019-09-24 | Stop reason: ALTCHOICE

## 2019-09-16 RX ORDER — MOMETASONE FUROATE 50 UG/1
2 SPRAY, METERED NASAL DAILY PRN
Qty: 1 INHALER | Refills: 5 | Status: SHIPPED | OUTPATIENT
Start: 2019-09-16 | End: 2020-06-23 | Stop reason: SDUPTHER

## 2019-09-16 ASSESSMENT — ENCOUNTER SYMPTOMS
VOMITING: 0
EYE DISCHARGE: 0
EYE PAIN: 0
TROUBLE SWALLOWING: 1
VOICE CHANGE: 1
DIARRHEA: 0
ABDOMINAL PAIN: 0
SHORTNESS OF BREATH: 0
COUGH: 1
WHEEZING: 0
NAUSEA: 0
CONSTIPATION: 0
BACK PAIN: 1
RHINORRHEA: 0
SORE THROAT: 0

## 2019-09-23 ENCOUNTER — NURSE ONLY (OUTPATIENT)
Dept: FAMILY MEDICINE CLINIC | Age: 54
End: 2019-09-23

## 2019-09-23 VITALS — SYSTOLIC BLOOD PRESSURE: 132 MMHG | RESPIRATION RATE: 20 BRPM | DIASTOLIC BLOOD PRESSURE: 86 MMHG | HEART RATE: 80 BPM

## 2019-09-23 DIAGNOSIS — I10 ESSENTIAL HYPERTENSION: Primary | ICD-10-CM

## 2019-09-23 RX ORDER — RANITIDINE 150 MG/1
TABLET ORAL
Qty: 60 TABLET | Refills: 3 | Status: SHIPPED | OUTPATIENT
Start: 2019-09-23 | End: 2020-01-22

## 2019-09-24 ENCOUNTER — TELEPHONE (OUTPATIENT)
Dept: FAMILY MEDICINE CLINIC | Age: 54
End: 2019-09-24

## 2019-09-24 DIAGNOSIS — I10 ESSENTIAL HYPERTENSION: Primary | ICD-10-CM

## 2019-09-24 RX ORDER — LOSARTAN POTASSIUM 100 MG/1
100 TABLET ORAL DAILY
Qty: 90 TABLET | Refills: 1 | Status: SHIPPED | OUTPATIENT
Start: 2019-09-24 | End: 2020-03-20

## 2019-10-01 ENCOUNTER — PATIENT MESSAGE (OUTPATIENT)
Dept: FAMILY MEDICINE CLINIC | Age: 54
End: 2019-10-01

## 2019-10-01 DIAGNOSIS — E78.00 PURE HYPERCHOLESTEROLEMIA: ICD-10-CM

## 2019-10-01 RX ORDER — ATORVASTATIN CALCIUM 80 MG/1
80 TABLET, FILM COATED ORAL DAILY
Qty: 90 TABLET | Refills: 3 | Status: SHIPPED | OUTPATIENT
Start: 2019-10-01 | End: 2020-09-24

## 2019-10-02 DIAGNOSIS — I10 ESSENTIAL HYPERTENSION: ICD-10-CM

## 2019-10-02 NOTE — TELEPHONE ENCOUNTER
[facility] requesting refill of the following pended medication:    LOV 9/16/19  RTO 1 week  LRF 11/17/17    Health Maintenance   Topic Date Due    Shingles Vaccine (1 of 2) 11/06/2013    Annual Wellness Visit (AWV)  05/29/2019    A1C test (Diabetic or Prediabetic)  09/11/2020    Lipid screen  09/11/2020    Potassium monitoring  09/11/2020    Creatinine monitoring  09/11/2020    Colon cancer screen colonoscopy  10/11/2022    DTaP/Tdap/Td vaccine (2 - Td) 07/12/2026    Flu vaccine  Completed    Hepatitis C screen  Completed    HIV screen  Completed    Pneumococcal 0-64 years Vaccine  Aged Out             (applicable per patient's age: Cancer Screenings, Depression Screening, Fall Risk Screening, Immunizations)    Hemoglobin A1C (%)   Date Value   09/11/2019 5.7   11/19/2018 5.8   04/10/2018 6.0     LDL Cholesterol (mg/dL)   Date Value   09/11/2019 68     AST (U/L)   Date Value   09/11/2019 18     ALT (U/L)   Date Value   09/11/2019 15     BUN (mg/dL)   Date Value   09/11/2019 12      (goal A1C is < 7)   (goal LDL is <100) need 30-50% reduction from baseline     BP Readings from Last 3 Encounters:   09/23/19 132/86   09/16/19 (!) 170/106   03/19/19 136/82    (goal /80)      All Future Testing planned in [facility]:  Lab Frequency Next Occurrence       Next Visit Date:  No future appointments. Patient Active Problem List:     Hyperlipidemia     Hypertension     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 9)

## 2019-10-04 RX ORDER — LISINOPRIL 20 MG/1
20 TABLET ORAL DAILY
Qty: 90 TABLET | Refills: 1 | OUTPATIENT
Start: 2019-10-04 | End: 2020-04-01

## 2019-10-21 DIAGNOSIS — K21.9 GASTROESOPHAGEAL REFLUX DISEASE WITHOUT ESOPHAGITIS: ICD-10-CM

## 2019-11-02 DIAGNOSIS — M51.26 LUMBAGO DUE TO DISPLACEMENT OF INTERVERTEBRAL DISC: ICD-10-CM

## 2019-11-02 DIAGNOSIS — F33.0 MAJOR DEPRESSIVE DISORDER, RECURRENT EPISODE, MILD (HCC): ICD-10-CM

## 2019-11-04 RX ORDER — DULOXETIN HYDROCHLORIDE 60 MG/1
60 CAPSULE, DELAYED RELEASE ORAL DAILY
Qty: 90 CAPSULE | Refills: 1 | Status: SHIPPED | OUTPATIENT
Start: 2019-11-04 | End: 2020-02-10

## 2019-12-20 DIAGNOSIS — I10 ESSENTIAL HYPERTENSION: ICD-10-CM

## 2019-12-22 RX ORDER — HYDROCHLOROTHIAZIDE 12.5 MG/1
12.5 CAPSULE, GELATIN COATED ORAL EVERY MORNING
Qty: 30 CAPSULE | Refills: 5 | Status: SHIPPED | OUTPATIENT
Start: 2019-12-22 | End: 2020-06-22

## 2020-01-03 RX ORDER — IBUPROFEN 800 MG/1
800 TABLET ORAL EVERY 8 HOURS PRN
Qty: 270 TABLET | Refills: 1 | Status: SHIPPED | OUTPATIENT
Start: 2020-01-03 | End: 2021-04-27

## 2020-01-22 ENCOUNTER — OFFICE VISIT (OUTPATIENT)
Dept: GASTROENTEROLOGY | Age: 55
End: 2020-01-22
Payer: MEDICARE

## 2020-01-22 VITALS
HEART RATE: 69 BPM | WEIGHT: 267 LBS | SYSTOLIC BLOOD PRESSURE: 154 MMHG | BODY MASS INDEX: 31.66 KG/M2 | DIASTOLIC BLOOD PRESSURE: 89 MMHG

## 2020-01-22 PROCEDURE — G8482 FLU IMMUNIZE ORDER/ADMIN: HCPCS | Performed by: INTERNAL MEDICINE

## 2020-01-22 PROCEDURE — G8417 CALC BMI ABV UP PARAM F/U: HCPCS | Performed by: INTERNAL MEDICINE

## 2020-01-22 PROCEDURE — 1036F TOBACCO NON-USER: CPT | Performed by: INTERNAL MEDICINE

## 2020-01-22 PROCEDURE — 99214 OFFICE O/P EST MOD 30 MIN: CPT | Performed by: INTERNAL MEDICINE

## 2020-01-22 PROCEDURE — 3017F COLORECTAL CA SCREEN DOC REV: CPT | Performed by: INTERNAL MEDICINE

## 2020-01-22 PROCEDURE — G8427 DOCREV CUR MEDS BY ELIG CLIN: HCPCS | Performed by: INTERNAL MEDICINE

## 2020-01-22 ASSESSMENT — ENCOUNTER SYMPTOMS
TROUBLE SWALLOWING: 1
CHOKING: 1
VOMITING: 1
WHEEZING: 0
ANAL BLEEDING: 0
NAUSEA: 0
ABDOMINAL DISTENTION: 0
DIARRHEA: 0
BLOOD IN STOOL: 0
COUGH: 1
RECTAL PAIN: 0
CONSTIPATION: 0
ABDOMINAL PAIN: 0

## 2020-01-22 NOTE — PROGRESS NOTES
file   Occupational History    Not on file   Social Needs    Financial resource strain: Not on file    Food insecurity:     Worry: Not on file     Inability: Not on file    Transportation needs:     Medical: Not on file     Non-medical: Not on file   Tobacco Use    Smoking status: Never Smoker    Smokeless tobacco: Never Used   Substance and Sexual Activity    Alcohol use: Yes     Comment: social    Drug use: No    Sexual activity: Not on file   Lifestyle    Physical activity:     Days per week: Not on file     Minutes per session: Not on file    Stress: Not on file   Relationships    Social connections:     Talks on phone: Not on file     Gets together: Not on file     Attends [employer] service: Not on file     Active member of club or organization: Not on file     Attends meetings of clubs or organizations: Not on file     Relationship status: Not on file    Intimate partner violence:     Fear of current or ex partner: Not on file     Emotionally abused: Not on file     Physically abused: Not on file     Forced sexual activity: Not on file   Other Topics Concern    Not on file   Social History Narrative    Not on file       REVIEW OF SYSTEMS: A 12-point review of systemswas obtained and pertinent positives and negatives were enumerated above in the history of present illness. All other reviewed systems / symptoms were negative. Review of Systems   Constitutional: Positive for fatigue. Negative for appetite change and unexpected weight change. HENT: Positive for trouble swallowing. Respiratory: Positive for cough and choking. Negative for wheezing. Cardiovascular: Negative for chest pain, palpitations and leg swelling. Gastrointestinal: Positive for vomiting (gag reflex). Negative for abdominal distention, abdominal pain, anal bleeding, blood in stool, constipation, diarrhea, nausea and rectal pain. Genitourinary: Negative for difficulty urinating.    Allergic/Immunologic: Negative for environmental allergies and food allergies. Neurological: Positive for dizziness. Negative for weakness, light-headedness, numbness and headaches. Hematological: Does not bruise/bleed easily. Psychiatric/Behavioral: Positive for sleep disturbance. The patient is nervous/anxious. Depression             LABORATORY DATA: Reviewed  Lab Results   Component Value Date    WBC 8.8 11/10/2017    HGB 13.0 11/10/2017    HCT 41.3 11/10/2017    MCV 96.0 11/10/2017     11/10/2017     09/11/2019    K 5.0 09/11/2019    CL 95 (L) 09/11/2019    CO2 29 09/11/2019    BUN 12 09/11/2019    CREATININE 0.89 09/11/2019    LABPROT 7.5 12/14/2012    LABALBU 4.5 09/11/2019    BILITOT 0.46 09/11/2019    ALKPHOS 111 09/11/2019    AST 18 09/11/2019    ALT 15 09/11/2019         Lab Results   Component Value Date    RBC 4.30 11/10/2017    HGB 13.0 11/10/2017    MCV 96.0 11/10/2017    MCH 30.2 11/10/2017    MCHC 31.5 11/10/2017    RDW 12.8 11/10/2017    MPV 9.6 11/10/2017    BASOPCT 1 02/15/2017    LYMPHSABS 2.20 02/15/2017    MONOSABS 1.20 02/15/2017    NEUTROABS 10.80 (H) 02/15/2017    EOSABS 0.30 02/15/2017    BASOSABS 0.10 02/15/2017         DIAGNOSTIC TESTING:     No results found. PHYSICAL EXAMINATION: Vital signs reviewed per the nursing documentation. BP (!) 154/89   Pulse 69   Wt 267 lb (121.1 kg)   BMI 31.66 kg/m²   Body mass index is 31.66 kg/m². Physical Exam  Vitals signs and nursing note reviewed. Constitutional:       General: He is not in acute distress. Appearance: He is well-developed. He is not diaphoretic. HENT:      Head: Normocephalic and atraumatic. Eyes:      General: No scleral icterus. Pupils: Pupils are equal, round, and reactive to light. Neck:      Musculoskeletal: Normal range of motion and neck supple. Thyroid: No thyromegaly. Vascular: No JVD. Trachea: No tracheal deviation. Cardiovascular:      Rate and Rhythm: Normal rate and regular rhythm.

## 2020-02-10 RX ORDER — DULOXETIN HYDROCHLORIDE 60 MG/1
CAPSULE, DELAYED RELEASE ORAL
Qty: 90 CAPSULE | Refills: 1 | Status: SHIPPED | OUTPATIENT
Start: 2020-02-10 | End: 2020-11-17

## 2020-02-11 ENCOUNTER — ANESTHESIA EVENT (OUTPATIENT)
Dept: OPERATING ROOM | Age: 55
End: 2020-02-11
Payer: MEDICARE

## 2020-02-14 ENCOUNTER — TELEPHONE (OUTPATIENT)
Dept: GASTROENTEROLOGY | Age: 55
End: 2020-02-14

## 2020-02-19 ENCOUNTER — HOSPITAL ENCOUNTER (OUTPATIENT)
Age: 55
Setting detail: OUTPATIENT SURGERY
Discharge: HOME | End: 2020-02-19
Attending: INTERNAL MEDICINE | Admitting: INTERNAL MEDICINE
Payer: MEDICARE

## 2020-02-19 ENCOUNTER — ANESTHESIA (OUTPATIENT)
Dept: OPERATING ROOM | Age: 55
End: 2020-02-19
Payer: MEDICARE

## 2020-02-19 VITALS
HEART RATE: 84 BPM | OXYGEN SATURATION: 98 % | TEMPERATURE: 98 F | DIASTOLIC BLOOD PRESSURE: 84 MMHG | RESPIRATION RATE: 16 BRPM | SYSTOLIC BLOOD PRESSURE: 134 MMHG | WEIGHT: 259.2 LBS | BODY MASS INDEX: 32.23 KG/M2 | HEIGHT: 75 IN

## 2020-02-19 VITALS — OXYGEN SATURATION: 100 % | SYSTOLIC BLOOD PRESSURE: 114 MMHG | DIASTOLIC BLOOD PRESSURE: 61 MMHG

## 2020-02-19 LAB — GLUCOSE BLD-MCNC: 80 MG/DL (ref 75–110)

## 2020-02-19 PROCEDURE — 7100000011 HC PHASE II RECOVERY - ADDTL 15 MIN: Performed by: INTERNAL MEDICINE

## 2020-02-19 PROCEDURE — 88305 TISSUE EXAM BY PATHOLOGIST: CPT

## 2020-02-19 PROCEDURE — 7100000010 HC PHASE II RECOVERY - FIRST 15 MIN: Performed by: INTERNAL MEDICINE

## 2020-02-19 PROCEDURE — 2580000003 HC RX 258: Performed by: ANESTHESIOLOGY

## 2020-02-19 PROCEDURE — 3700000000 HC ANESTHESIA ATTENDED CARE: Performed by: INTERNAL MEDICINE

## 2020-02-19 PROCEDURE — 6360000002 HC RX W HCPCS: Performed by: NURSE ANESTHETIST, CERTIFIED REGISTERED

## 2020-02-19 PROCEDURE — 43239 EGD BIOPSY SINGLE/MULTIPLE: CPT | Performed by: INTERNAL MEDICINE

## 2020-02-19 PROCEDURE — 2709999900 HC NON-CHARGEABLE SUPPLY: Performed by: INTERNAL MEDICINE

## 2020-02-19 PROCEDURE — 2500000003 HC RX 250 WO HCPCS: Performed by: NURSE ANESTHETIST, CERTIFIED REGISTERED

## 2020-02-19 PROCEDURE — 82947 ASSAY GLUCOSE BLOOD QUANT: CPT

## 2020-02-19 PROCEDURE — 3609012400 HC EGD TRANSORAL BIOPSY SINGLE/MULTIPLE: Performed by: INTERNAL MEDICINE

## 2020-02-19 PROCEDURE — 7100000000 HC PACU RECOVERY - FIRST 15 MIN: Performed by: INTERNAL MEDICINE

## 2020-02-19 RX ORDER — SODIUM CHLORIDE 0.9 % (FLUSH) 0.9 %
10 SYRINGE (ML) INJECTION EVERY 12 HOURS SCHEDULED
Status: DISCONTINUED | OUTPATIENT
Start: 2020-02-19 | End: 2020-02-19 | Stop reason: HOSPADM

## 2020-02-19 RX ORDER — HYDRALAZINE HYDROCHLORIDE 20 MG/ML
5 INJECTION INTRAMUSCULAR; INTRAVENOUS EVERY 10 MIN PRN
Status: DISCONTINUED | OUTPATIENT
Start: 2020-02-19 | End: 2020-02-19 | Stop reason: HOSPADM

## 2020-02-19 RX ORDER — SODIUM CHLORIDE, SODIUM LACTATE, POTASSIUM CHLORIDE, CALCIUM CHLORIDE 600; 310; 30; 20 MG/100ML; MG/100ML; MG/100ML; MG/100ML
INJECTION, SOLUTION INTRAVENOUS CONTINUOUS
Status: DISCONTINUED | OUTPATIENT
Start: 2020-02-19 | End: 2020-02-19 | Stop reason: HOSPADM

## 2020-02-19 RX ORDER — LIDOCAINE HYDROCHLORIDE 10 MG/ML
INJECTION, SOLUTION EPIDURAL; INFILTRATION; INTRACAUDAL; PERINEURAL PRN
Status: DISCONTINUED | OUTPATIENT
Start: 2020-02-19 | End: 2020-02-19 | Stop reason: SDUPTHER

## 2020-02-19 RX ORDER — PROPOFOL 10 MG/ML
INJECTION, EMULSION INTRAVENOUS PRN
Status: DISCONTINUED | OUTPATIENT
Start: 2020-02-19 | End: 2020-02-19 | Stop reason: SDUPTHER

## 2020-02-19 RX ORDER — MIDAZOLAM HYDROCHLORIDE 1 MG/ML
2 INJECTION INTRAMUSCULAR; INTRAVENOUS
Status: DISCONTINUED | OUTPATIENT
Start: 2020-02-19 | End: 2020-02-19 | Stop reason: HOSPADM

## 2020-02-19 RX ORDER — SODIUM CHLORIDE 9 MG/ML
INJECTION, SOLUTION INTRAVENOUS CONTINUOUS
Status: DISCONTINUED | OUTPATIENT
Start: 2020-02-19 | End: 2020-02-19 | Stop reason: HOSPADM

## 2020-02-19 RX ORDER — SODIUM CHLORIDE 0.9 % (FLUSH) 0.9 %
10 SYRINGE (ML) INJECTION PRN
Status: DISCONTINUED | OUTPATIENT
Start: 2020-02-19 | End: 2020-02-19 | Stop reason: HOSPADM

## 2020-02-19 RX ORDER — ONDANSETRON 2 MG/ML
4 INJECTION INTRAMUSCULAR; INTRAVENOUS
Status: DISCONTINUED | OUTPATIENT
Start: 2020-02-19 | End: 2020-02-19 | Stop reason: HOSPADM

## 2020-02-19 RX ORDER — METOCLOPRAMIDE HYDROCHLORIDE 5 MG/ML
10 INJECTION INTRAMUSCULAR; INTRAVENOUS
Status: DISCONTINUED | OUTPATIENT
Start: 2020-02-19 | End: 2020-02-19 | Stop reason: HOSPADM

## 2020-02-19 RX ORDER — FENTANYL CITRATE 50 UG/ML
25 INJECTION, SOLUTION INTRAMUSCULAR; INTRAVENOUS EVERY 5 MIN PRN
Status: DISCONTINUED | OUTPATIENT
Start: 2020-02-19 | End: 2020-02-19 | Stop reason: HOSPADM

## 2020-02-19 RX ORDER — FENTANYL CITRATE 50 UG/ML
50 INJECTION, SOLUTION INTRAMUSCULAR; INTRAVENOUS EVERY 5 MIN PRN
Status: DISCONTINUED | OUTPATIENT
Start: 2020-02-19 | End: 2020-02-19 | Stop reason: HOSPADM

## 2020-02-19 RX ADMIN — PROPOFOL 50 MG: 10 INJECTION, EMULSION INTRAVENOUS at 10:38

## 2020-02-19 RX ADMIN — LIDOCAINE HYDROCHLORIDE 50 MG: 10 INJECTION, SOLUTION EPIDURAL; INFILTRATION; INTRACAUDAL; PERINEURAL at 10:38

## 2020-02-19 RX ADMIN — PROPOFOL 50 MG: 10 INJECTION, EMULSION INTRAVENOUS at 10:39

## 2020-02-19 RX ADMIN — PROPOFOL 50 MG: 10 INJECTION, EMULSION INTRAVENOUS at 10:41

## 2020-02-19 RX ADMIN — SODIUM CHLORIDE, POTASSIUM CHLORIDE, SODIUM LACTATE AND CALCIUM CHLORIDE: 600; 310; 30; 20 INJECTION, SOLUTION INTRAVENOUS at 10:36

## 2020-02-19 RX ADMIN — PROPOFOL 20 MG: 10 INJECTION, EMULSION INTRAVENOUS at 10:43

## 2020-02-19 RX ADMIN — SODIUM CHLORIDE, POTASSIUM CHLORIDE, SODIUM LACTATE AND CALCIUM CHLORIDE: 600; 310; 30; 20 INJECTION, SOLUTION INTRAVENOUS at 09:30

## 2020-02-19 ASSESSMENT — PAIN SCALES - GENERAL
PAINLEVEL_OUTOF10: 0

## 2020-02-19 ASSESSMENT — PULMONARY FUNCTION TESTS
PIF_VALUE: 1

## 2020-02-19 ASSESSMENT — PAIN - FUNCTIONAL ASSESSMENT: PAIN_FUNCTIONAL_ASSESSMENT: 0-10

## 2020-02-19 NOTE — H&P
Procedure History and Physical    Pre-Procedural Diagnosis:    gerd  \ dysphagia     Indications:  same    Procedure Planned: endoscopy     History Obtained From:  patient    HISTORY OF PRESENT ILLNESS:       The patient is a 62 y.o. male who presents for the above procedure.         Past Medical History:    Past Medical History:   Diagnosis Date    Arthritis     Diabetes mellitus ([address]     Dizziness     History of blood transfusion     Hyperlipidemia     Hypertension     Lumbar disc herniation with radiculopathy     Nervously anxious     Osteoarthrosis of hip     Umbilical hernia        Past Surgical History:    Past Surgical History:   Procedure Laterality Date    CERVICAL FUSION  2011    COLONOSCOPY  10/11/2017    normal mucosa    LUMBAR FUSION  2009    LUMBAR LAMINECTOMY  2001    CO COLONOSCOPY W/BIOPSY SINGLE/MULTIPLE N/A 10/11/2017    COLONOSCOPY WITH BIOPSY performed by Sima Zeng MD at [address] EGD TRANSORAL BIOPSY SINGLE/MULTIPLE N/A 10/11/2017    EGD BIOPSY performed by Sima Zeng MD at [facility]  2006    right   [facility]  2005    left    UPPER GASTROINTESTINAL ENDOSCOPY  10/11/2017    gastritis       Medications:  Current Facility-Administered Medications   Medication Dose Route Frequency Provider Last Rate Last Dose    0.9 % sodium chloride infusion   Intravenous Continuous Vasyl Law MD        lactated ringers infusion   Intravenous Continuous Vasyl Law  mL/hr at 02/19/20 0930      sodium chloride flush 0.9 % injection 10 mL  10 mL Intravenous 2 times per day Vasyl Law MD        sodium chloride flush 0.9 % injection 10 mL  10 mL Intravenous PRN Vasyl Law MD        midazolam (VERSED) injection 2 mg  2 mg Intravenous Once PRN Vasyl Law MD           Allergies:   No Known Allergies              Social   Social History     Tobacco Use    Smoking status: Never Smoker    Smokeless tobacco: Never Used   Substance Use

## 2020-02-19 NOTE — ANESTHESIA PRE PROCEDURE
Department of Anesthesiology  Preprocedure Note       Name:  Castillo Gray   Age:  62 y.o.  :  1965                                          MRN:  [id number]         Date:  2020      Surgeon: Christa Boast):  Elvi Ruiz MD    Procedure: EGD ESOPHAGOGASTRODUODENOSCOPY (N/A )    Medications prior to admission:   Prior to Admission medications    Medication Sig Start Date End Date Taking?  Authorizing Provider   DULoxetine (CYMBALTA) 60 MG extended release capsule take 1 capsule by mouth once daily 2/10/20  Yes CARLOS Brewster CNP   metFORMIN (GLUCOPHAGE) 500 MG tablet Take 1 tablet by mouth 2 times daily (with meals) 20 Yes CARLOS Brewster CNP   ibuprofen (ADVIL;MOTRIN) 800 MG tablet TAKE 1 TABLET BY MOUTH EVERY 8 HOURS AS NEEDED FOR PAIN 1/3/20 1/2/21 Yes CARLOS Brewster CNP   hydrochlorothiazide (MICROZIDE) 12.5 MG capsule Take 1 capsule by mouth every morning 19 Yes CARLOS Brewster CNP   esomeprazole (NEXIUM) 20 MG delayed release capsule Take 1 capsule by mouth every morning (before breakfast) 10/21/19  Yes CARLOS Brewster CNP   atorvastatin (LIPITOR) 80 MG tablet Take 1 tablet by mouth daily 10/1/19 3/29/20 Yes CARLOS Brewster CNP   losartan (COZAAR) 100 MG tablet Take 1 tablet by mouth daily 19  Yes Angel Guzmán MD   famotidine (PEPCID) 20 MG tablet Take 20 mg by mouth 2 times daily   Yes Historical Provider, MD   mometasone (NASONEX) 50 MCG/ACT nasal spray 2 sprays by Nasal route daily as needed (allergies) 19 Yes CARLOS Brewster CNP   vitamin B-12 (CYANOCOBALAMIN) 1000 MCG tablet Take 1,000 mcg by mouth daily   Yes Historical Provider, MD   cetirizine (ZYRTEC) 10 MG tablet Take 10 mg by mouth daily   Yes Historical Provider, MD   Cholecalciferol (VITAMIN D3) 5000 units TABS Take 5,000 Units by mouth daily   Yes Historical Provider, MD       Current medications:    Current Facility-Administered 10/11/2017    gastritis       Social History:    Social History     Tobacco Use    Smoking status: Never Smoker    Smokeless tobacco: Never Used   Substance Use Topics    Alcohol use: Yes     Comment: social                                Counseling given: Not Answered      Vital Signs (Current):   Vitals:    02/12/20 1414 02/19/20 0916 02/19/20 0926   BP:  (!) 143/104 (!) 158/89   Pulse:  98    Resp:  16    Temp:  97.4 °F (36.3 °C)    TempSrc:  Temporal    SpO2:  100%    Weight: 260 lb (117.9 kg) 259 lb 3.2 oz (117.6 kg)    Height: 6' 3\" (1.905 m)                                                BP Readings from Last 3 Encounters:   02/19/20 (!) 158/89   01/22/20 (!) 154/89   09/23/19 132/86       NPO Status: Time of last liquid consumption: 0715(sip of water w meds)                        Time of last solid consumption: 2100                        Date of last liquid consumption: 02/19/20                        Date of last solid food consumption: 02/18/20    BMI:   Wt Readings from Last 3 Encounters:   02/19/20 259 lb 3.2 oz (117.6 kg)   01/22/20 267 lb (121.1 kg)   09/16/19 270 lb (122.5 kg)     Body mass index is 32.4 kg/m².     CBC:   Lab Results   Component Value Date    WBC 8.8 11/10/2017    RBC 4.30 11/10/2017    HGB 13.0 11/10/2017    HCT 41.3 11/10/2017    MCV 96.0 11/10/2017    RDW 12.8 11/10/2017     11/10/2017       CMP:   Lab Results   Component Value Date     09/11/2019    K 5.0 09/11/2019    CL 95 09/11/2019    CO2 29 09/11/2019    BUN 12 09/11/2019    CREATININE 0.89 09/11/2019    GFRAA >60 09/11/2019    LABGLOM >60 09/11/2019    GLUCOSE 85 11/19/2018    GLUCOSE 93 12/20/2011    PROT 7.3 09/11/2019    CALCIUM 10.0 09/11/2019    BILITOT 0.46 09/11/2019    ALKPHOS 111 09/11/2019    AST 18 09/11/2019    ALT 15 09/11/2019       POC Tests:   Recent Labs     02/19/20  0928   POCGLU 80       Coags: No results found for: PROTIME, INR, APTT    HCG (If Applicable): No results found for:

## 2020-02-21 ENCOUNTER — TELEPHONE (OUTPATIENT)
Dept: GASTROENTEROLOGY | Age: 55
End: 2020-02-21

## 2020-02-21 LAB — SURGICAL PATHOLOGY REPORT: NORMAL

## 2020-03-02 NOTE — TELEPHONE ENCOUNTER
Pt calling to see if any communication yet with [facility].  Please get in contact with him for a update as to what is going on

## 2020-03-06 NOTE — TELEPHONE ENCOUNTER
Patient called office to inform that his appointment at the [facility] is on 3/18/20 at 2:45 pm.  Stated that he was not given the doctor's name. They are requesting any photo's and or Cd's to be taken to the appointment. Advised patient to call medical records [phone]. Writer thanked for the help and call ended.

## 2020-03-10 RX ORDER — LISINOPRIL 20 MG/1
TABLET ORAL
Qty: 90 TABLET | Refills: 1 | OUTPATIENT
Start: 2020-03-10

## 2020-03-20 RX ORDER — LOSARTAN POTASSIUM 100 MG/1
TABLET ORAL
Qty: 90 TABLET | Refills: 1 | Status: SHIPPED | OUTPATIENT
Start: 2020-03-20 | End: 2020-09-14

## 2020-03-20 NOTE — TELEPHONE ENCOUNTER
Last visit: 9/16/19  Last Med refill: 10/24/19  Does patient have enough medication for 72 hours: Yes    Next Visit Date:  Future Appointments   Date Time Provider Donovan Karmen   4/29/2020  3:00 PM Nely Gomez MD [facility]   6/23/2020  2:00 PM CARLOS Underwood - CNP [facility] [address] Maintenance   Topic Date Due    Shingles Vaccine (1 of 2) 11/06/2013    Annual Wellness Visit (AWV)  05/29/2019    A1C test (Diabetic or Prediabetic)  09/11/2020    Lipid screen  09/11/2020    Potassium monitoring  09/11/2020    Creatinine monitoring  09/11/2020    Colon cancer screen colonoscopy  10/11/2022    DTaP/Tdap/Td vaccine (2 - Td) 07/12/2026    Flu vaccine  Completed    Hepatitis C screen  Completed    HIV screen  Completed    Hepatitis A vaccine  Aged Out    Hepatitis B vaccine  Aged Out    Hib vaccine  Aged Out    Meningococcal (ACWY) vaccine  Aged Out    Pneumococcal 0-64 years Vaccine  Aged Out       Hemoglobin A1C (%)   Date Value   09/11/2019 5.7   11/19/2018 5.8   04/10/2018 6.0             ( goal A1C is < 7)   No results found for: LABMICR  LDL Cholesterol (mg/dL)   Date Value   09/11/2019 68   04/10/2018 59       (goal LDL is <100)   AST (U/L)   Date Value   09/11/2019 18     ALT (U/L)   Date Value   09/11/2019 15     BUN (mg/dL)   Date Value   09/11/2019 12     BP Readings from Last 3 Encounters:   02/19/20 114/61   02/19/20 134/84   01/22/20 (!) 154/89          (goal 120/80)    All Future Testing planned in [facility]  Lab Frequency Next Occurrence   EGD Once 01/22/2020               Patient Active Problem List:     Hyperlipidemia     Hypertension     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 9)

## 2020-04-13 ENCOUNTER — TELEPHONE (OUTPATIENT)
Dept: GASTROENTEROLOGY | Age: 55
End: 2020-04-13

## 2020-04-15 ENCOUNTER — VIRTUAL VISIT (OUTPATIENT)
Dept: GASTROENTEROLOGY | Age: 55
End: 2020-04-15
Payer: MEDICARE

## 2020-04-15 PROCEDURE — G8427 DOCREV CUR MEDS BY ELIG CLIN: HCPCS | Performed by: INTERNAL MEDICINE

## 2020-04-15 PROCEDURE — 3017F COLORECTAL CA SCREEN DOC REV: CPT | Performed by: INTERNAL MEDICINE

## 2020-04-15 PROCEDURE — G8417 CALC BMI ABV UP PARAM F/U: HCPCS | Performed by: INTERNAL MEDICINE

## 2020-04-15 PROCEDURE — 99214 OFFICE O/P EST MOD 30 MIN: CPT | Performed by: INTERNAL MEDICINE

## 2020-04-15 PROCEDURE — 1036F TOBACCO NON-USER: CPT | Performed by: INTERNAL MEDICINE

## 2020-04-15 ASSESSMENT — ENCOUNTER SYMPTOMS
BLOOD IN STOOL: 0
ANAL BLEEDING: 0
DIARRHEA: 0
CHOKING: 1
NAUSEA: 0
ABDOMINAL PAIN: 0
CONSTIPATION: 0
ABDOMINAL DISTENTION: 0
WHEEZING: 0
TROUBLE SWALLOWING: 1
VOMITING: 0
COUGH: 0
RECTAL PAIN: 0

## 2020-04-15 NOTE — PROGRESS NOTES
around 5 cm. The upper esophageal sphincter opening is on the side of that large diverticulum once we found it went through it we were able to examine the rest of the GI tract  The esophagus looked normal otherwise     Esophagus: normal     Stomach:    Fundus: normal    Body: gastritis biopsies were taken from the stomach. Antrum:  gastritis biopsies were taken from the stomach.     Duodenum:     Descending: normal    Bulb: normal     The scope was removed and the patient tolerated the procedure well.      Recommendations/Plan:   1. F/U Biopsies  2. F/U In Office in 3-4 weeks  3. Discussed with the family     Electronically signed by Kanika Robledo MD  on 2/19/2020 at 10:49 AM -- Diagnosis --   STOMACH, BIOPSIES:   - MILD CHRONIC GASTRITIS.   - NEGATIVE FOR HELICOBACTER PYLORI INFECTION.        Donald Saenz,   **Electronically Signed Out**         sls/2/21/2020     Past Medical,Family, and Social History reviewed and does contribute to the patient presentingcondition. Patient's PMH/PSH,SH,PSYCH Hx, MEDs, ALLERGIES, and ROS were all reviewed and updated in the appropriate sections.     PAST MEDICAL HISTORY:  Past Medical History:   Diagnosis Date    Arthritis     Diabetes mellitus ([address]     Dizziness     History of blood transfusion     Hyperlipidemia     Hypertension     Lumbar disc herniation with radiculopathy     Nervously anxious     Osteoarthrosis of hip     Umbilical hernia        Past Surgical History:   Procedure Laterality Date    CERVICAL FUSION  2011    COLONOSCOPY  10/11/2017    normal mucosa    LUMBAR FUSION  2009    LUMBAR LAMINECTOMY  2001    TX COLONOSCOPY W/BIOPSY SINGLE/MULTIPLE N/A 10/11/2017    COLONOSCOPY WITH BIOPSY performed by Kanika Robledo MD at [address] EGD TRANSORAL BIOPSY SINGLE/MULTIPLE N/A 10/11/2017    EGD BIOPSY performed by Kanika Robledo MD at [facility]  2006    right   [facility]  2005    left Occupational History    Not on file   Social Needs    Financial resource strain: Not on file    Food insecurity     Worry: Not on file     Inability: Not on file    Transportation needs     Medical: Not on file     Non-medical: Not on file   Tobacco Use    Smoking status: Never Smoker    Smokeless tobacco: Never Used   Substance and Sexual Activity    Alcohol use: Yes     Comment: social    Drug use: No    Sexual activity: Not on file   Lifestyle    Physical activity     Days per week: Not on file     Minutes per session: Not on file    Stress: Not on file   Relationships    Social connections     Talks on phone: Not on file     Gets together: Not on file     Attends Gnosticism service: Not on file     Active member of club or organization: Not on file     Attends meetings of clubs or organizations: Not on file     Relationship status: Not on file    Intimate partner violence     Fear of current or ex partner: Not on file     Emotionally abused: Not on file     Physically abused: Not on file     Forced sexual activity: Not on file   Other Topics Concern    Not on file   Social History Narrative    Not on file       REVIEW OF SYSTEMS: A 12-point review of systemswas obtained and pertinent positives and negatives were enumerated above in the history of present illness. All other reviewed systems / symptoms were negative. Review of Systems   Constitutional: Positive for fatigue. Negative for appetite change and unexpected weight change. HENT: Positive for trouble swallowing. Respiratory: Positive for choking (on food). Negative for cough and wheezing. Cardiovascular: Negative for chest pain, palpitations and leg swelling. Gastrointestinal: Negative for abdominal distention, abdominal pain, anal bleeding, blood in stool, constipation, diarrhea, nausea, rectal pain and vomiting. Genitourinary: Negative for difficulty urinating.    Allergic/Immunologic: Negative for environmental allergies

## 2020-04-21 NOTE — TELEPHONE ENCOUNTER
LOV  9/16/19  LRF 10/21/19  RTO 1 week, Scheduled      Health Maintenance   Topic Date Due    Shingles Vaccine (1 of 2) 11/06/2013    Annual Wellness Visit (AWV)  05/29/2019    A1C test (Diabetic or Prediabetic)  09/11/2020    Potassium monitoring  09/11/2020    Creatinine monitoring  09/11/2020    Colon cancer screen colonoscopy  10/11/2022    Lipid screen  09/11/2024    DTaP/Tdap/Td vaccine (2 - Td) 07/12/2026    Flu vaccine  Completed    Hepatitis C screen  Completed    HIV screen  Completed    Hepatitis A vaccine  Aged Out    Hepatitis B vaccine  Aged Out    Hib vaccine  Aged Out    Meningococcal (ACWY) vaccine  Aged Out    Pneumococcal 0-64 years Vaccine  Aged Out             (applicable per patient's age: Cancer Screenings, Depression Screening, Fall Risk Screening, Immunizations)    Hemoglobin A1C (%)   Date Value   09/11/2019 5.7   11/19/2018 5.8   04/10/2018 6.0     LDL Cholesterol (mg/dL)   Date Value   09/11/2019 68     AST (U/L)   Date Value   09/11/2019 18     ALT (U/L)   Date Value   09/11/2019 15     BUN (mg/dL)   Date Value   09/11/2019 12      (goal A1C is < 7)   (goal LDL is <100) need 30-50% reduction from baseline     BP Readings from Last 3 Encounters:   02/19/20 114/61   02/19/20 134/84   01/22/20 (!) 154/89    (goal /80)      All Future Testing planned in [facility]:  Lab Frequency Next Occurrence   EGD Once 01/22/2020       Next Visit Date:  Future Appointments   Date Time Provider Donovan Peraza   6/10/2020  2:15 PM MD Belen [facility]razas [facility]   6/23/2020  2:00 PM CARLOS Caban - CNP [place] [facility]   10/14/2020  1:00 PM MD Belen Davis [facility]            Patient Active Problem List:     Hyperlipidemia     Hypertension     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     Major depressive disorder, recurrent episode, mild (HCC)     Impaired fasting blood sugar     Umbilical hernia without obstruction and without gangrene     Dysphagia

## 2020-06-09 ENCOUNTER — TELEPHONE (OUTPATIENT)
Dept: GASTROENTEROLOGY | Age: 55
End: 2020-06-09

## 2020-06-22 RX ORDER — HYDROCHLOROTHIAZIDE 12.5 MG/1
12.5 CAPSULE, GELATIN COATED ORAL EVERY MORNING
Qty: 30 CAPSULE | Refills: 5 | Status: SHIPPED | OUTPATIENT
Start: 2020-06-22 | End: 2020-12-16

## 2020-06-23 ENCOUNTER — OFFICE VISIT (OUTPATIENT)
Dept: FAMILY MEDICINE CLINIC | Age: 55
End: 2020-06-23
Payer: MEDICARE

## 2020-06-23 VITALS
DIASTOLIC BLOOD PRESSURE: 88 MMHG | OXYGEN SATURATION: 97 % | SYSTOLIC BLOOD PRESSURE: 150 MMHG | RESPIRATION RATE: 16 BRPM | HEART RATE: 95 BPM | HEIGHT: 76 IN | BODY MASS INDEX: 32.39 KG/M2 | TEMPERATURE: 98.5 F | WEIGHT: 266 LBS

## 2020-06-23 PROBLEM — J90 PLEURAL EFFUSION: Status: ACTIVE | Noted: 2020-06-23

## 2020-06-23 PROBLEM — M19.91 PRIMARY OSTEOARTHRITIS: Status: ACTIVE | Noted: 2020-06-23

## 2020-06-23 PROBLEM — E11.9 DIABETES MELLITUS WITHOUT COMPLICATION (HCC): Status: ACTIVE | Noted: 2020-06-23

## 2020-06-23 PROBLEM — R73.03 PREDIABETES: Status: ACTIVE | Noted: 2020-06-23

## 2020-06-23 PROBLEM — Q38.7: Status: ACTIVE | Noted: 2020-06-09

## 2020-06-23 PROBLEM — S20.219A CONTUSION OF CHEST: Status: ACTIVE | Noted: 2020-06-23

## 2020-06-23 PROBLEM — J13 PNEUMOCOCCAL PNEUMONIA (HCC): Status: ACTIVE | Noted: 2020-06-23

## 2020-06-23 PROBLEM — G89.4 CHRONIC PAIN DISORDER: Status: ACTIVE | Noted: 2020-06-23

## 2020-06-23 PROCEDURE — 3017F COLORECTAL CA SCREEN DOC REV: CPT | Performed by: NURSE PRACTITIONER

## 2020-06-23 PROCEDURE — G0438 PPPS, INITIAL VISIT: HCPCS | Performed by: NURSE PRACTITIONER

## 2020-06-23 RX ORDER — MOMETASONE FUROATE 50 UG/1
2 SPRAY, METERED NASAL DAILY PRN
Qty: 1 INHALER | Refills: 5 | Status: SHIPPED | OUTPATIENT
Start: 2020-06-23 | End: 2023-10-12

## 2020-06-23 SDOH — ECONOMIC STABILITY: TRANSPORTATION INSECURITY
IN THE PAST 12 MONTHS, HAS THE LACK OF TRANSPORTATION KEPT YOU FROM MEDICAL APPOINTMENTS OR FROM GETTING MEDICATIONS?: NO

## 2020-06-23 SDOH — ECONOMIC STABILITY: FOOD INSECURITY: WITHIN THE PAST 12 MONTHS, THE FOOD YOU BOUGHT JUST DIDN'T LAST AND YOU DIDN'T HAVE MONEY TO GET MORE.: NEVER TRUE

## 2020-06-23 SDOH — ECONOMIC STABILITY: FOOD INSECURITY: WITHIN THE PAST 12 MONTHS, YOU WORRIED THAT YOUR FOOD WOULD RUN OUT BEFORE YOU GOT MONEY TO BUY MORE.: NEVER TRUE

## 2020-06-23 SDOH — ECONOMIC STABILITY: TRANSPORTATION INSECURITY
IN THE PAST 12 MONTHS, HAS LACK OF TRANSPORTATION KEPT YOU FROM MEETINGS, WORK, OR FROM GETTING THINGS NEEDED FOR DAILY LIVING?: NO

## 2020-06-23 SDOH — ECONOMIC STABILITY: INCOME INSECURITY: HOW HARD IS IT FOR YOU TO PAY FOR THE VERY BASICS LIKE FOOD, HOUSING, MEDICAL CARE, AND HEATING?: NOT HARD AT ALL

## 2020-06-23 ASSESSMENT — PATIENT HEALTH QUESTIONNAIRE - PHQ9
SUM OF ALL RESPONSES TO PHQ QUESTIONS 1-9: 2
2. FEELING DOWN, DEPRESSED OR HOPELESS: 1
SUM OF ALL RESPONSES TO PHQ QUESTIONS 1-9: 2
SUM OF ALL RESPONSES TO PHQ QUESTIONS 1-9: 2
SUM OF ALL RESPONSES TO PHQ9 QUESTIONS 1 & 2: 2
1. LITTLE INTEREST OR PLEASURE IN DOING THINGS: 1
SUM OF ALL RESPONSES TO PHQ QUESTIONS 1-9: 2

## 2020-06-23 ASSESSMENT — LIFESTYLE VARIABLES
HAVE YOU OR SOMEONE ELSE BEEN INJURED AS A RESULT OF YOUR DRINKING: 0
HOW OFTEN DURING THE LAST YEAR HAVE YOU FOUND THAT YOU WERE NOT ABLE TO STOP DRINKING ONCE YOU HAD STARTED: 1
HOW OFTEN DO YOU HAVE SIX OR MORE DRINKS ON ONE OCCASION: 1
AUDIT TOTAL SCORE: 6
HOW OFTEN DURING THE LAST YEAR HAVE YOU FAILED TO DO WHAT WAS NORMALLY EXPECTED FROM YOU BECAUSE OF DRINKING: 0
HOW OFTEN DURING THE LAST YEAR HAVE YOU BEEN UNABLE TO REMEMBER WHAT HAPPENED THE NIGHT BEFORE BECAUSE YOU HAD BEEN DRINKING: 0
AUDIT-C TOTAL SCORE: 5
HOW OFTEN DO YOU HAVE A DRINK CONTAINING ALCOHOL: 3
HOW OFTEN DURING THE LAST YEAR HAVE YOU NEEDED AN ALCOHOLIC DRINK FIRST THING IN THE MORNING TO GET YOURSELF GOING AFTER A NIGHT OF HEAVY DRINKING: 0
HOW MANY STANDARD DRINKS CONTAINING ALCOHOL DO YOU HAVE ON A TYPICAL DAY: 1
HOW OFTEN DURING THE LAST YEAR HAVE YOU HAD A FEELING OF GUILT OR REMORSE AFTER DRINKING: 0
HAS A RELATIVE, FRIEND, DOCTOR, OR ANOTHER HEALTH PROFESSIONAL EXPRESSED CONCERN ABOUT YOUR DRINKING OR SUGGESTED YOU CUT DOWN: 0

## 2020-06-23 NOTE — PROGRESS NOTES
Cuff Size: Large Adult Large Adult   Pulse: 95    Resp: 16    Temp: 98.5 °F (36.9 °C)    TempSrc: Temporal    SpO2: 97%    Weight: 266 lb (120.7 kg)    Height: 6' 4\" (1.93 m)      Body mass index is 32.38 kg/m². Has been out of HCTZ since Saturday. BP a little higher today. Tried picking up from pharmacy yesterday and wasn't ready yet. Saw Dr. Dwight Wills and had scope. Diagnosed with Zenker diverticulum and was referred to [facility]. Saw Dr. Dawn Clifford. Had swallow study. Going to have surgery on July 15 to repair. Has to go week before for pre-op. Also has to have Covid test 3 days prior. Needs order to complete locally. Needs a refill of Nasonex for allergies. Based upon direct observation of the patient, evaluation of cognition reveals recent and remote memory intact. General Appearance: alert and oriented to person, place and time, well-developed and well-nourished, in no acute distress  Skin: warm and dry, no rash or erythema  Pulmonary/Chest: clear to auscultation bilaterally- no wheezes, rales or rhonchi, normal air movement, no respiratory distress  Cardiovascular: normal rate, regular rhythm and normal S1 and S2    Patient's complete Health Risk Assessment and screening values have been reviewed and are found in Flowsheets. The following problems were reviewed today and where indicated follow up appointments were made and/or referrals ordered. Positive Risk Factor Screenings with Interventions:     Cognitive:  Clock Drawing Test (CDT) Score: Normal  Total Score Interpretation: Positive Mini-Cog  Did the patient refuse to take the cognition test?: No  Cognitive Impairment Interventions:  · No intervention required at this time. General Health:  General  In general, how would you say your health is?: Fair  In the past 7 days, have you experienced any of the following?  New or Increased Pain, New or Increased Fatigue, Loneliness, Social Isolation, Stress or Anger?: (!) Stress  Do you get the social and emotional support that you need?: Yes  Do you have a Living Will?: Yes  General Health Risk Interventions:  · Stress: patient's comments regarding reasons for stress and/or anger: Stress about new diagnosis and upcoming surgery. Relieved surgery was scheduled today. Health Habits/Nutrition:  Health Habits/Nutrition  Do you exercise for at least 20 minutes 2-3 times per week?: Yes  Have you lost any weight without trying in the past 3 months?: (!) Yes  Do you eat fewer than 2 meals per day?: No  Have you seen a dentist within the past year?: Yes  Body mass index is 32.38 kg/m².   Health Habits/Nutrition Interventions:  · Nutritional issues:  educational materials for healthy, well-balanced diet provided    Hearing/Vision:  No exam data present  Hearing/Vision  Do you or your family notice any trouble with your hearing?: No  Do you have difficulty driving, watching TV, or doing any of your daily activities because of your eyesight?: No  Have you had an eye exam within the past year?: (!) No  Hearing/Vision Interventions:  · Vision concerns:  patient encouraged to make appointment with his/her eye specialist    Safety:  Safety  Do you have working smoke detectors?: Yes  Have all throw rugs been removed or fastened?: (!) No  Do you have non-slip mats or surfaces in all bathtubs/showers?: Yes  Do all of your stairways have a railing or banister?: Yes  Are your doorways, halls and stairs free of clutter?: Yes  Do you always fasten your seatbelt when you are in a car?: Yes  Safety Interventions:  · Home safety tips provided    Personalized Preventive Plan   Current Health Maintenance Status  Immunization History   Administered Date(s) Administered    Influenza 12/11/2012, 09/09/2013    Influenza Virus Vaccine 12/22/2014, 12/09/2015, 09/22/2016, 09/01/2017    Influenza, MDCK Quadv, with preserv IM (Flucelvax 4 yrs and older) 03/19/2019    Influenza, Quadv, IM, PF (6 mo and older Fluzone, Flulaval,

## 2020-08-18 ENCOUNTER — TELEPHONE (OUTPATIENT)
Dept: GASTROENTEROLOGY | Age: 55
End: 2020-08-18

## 2020-08-18 NOTE — TELEPHONE ENCOUNTER
Tried calling patient to verify that he wanted to r/s for 8/19/20 and mailbox was full. Appointment cancelled. He does have another upcoming appointment that he can keep. This is on 10/14/20.

## 2020-09-14 RX ORDER — LOSARTAN POTASSIUM 100 MG/1
100 TABLET ORAL DAILY
Qty: 90 TABLET | Refills: 1 | Status: SHIPPED | OUTPATIENT
Start: 2020-09-14 | End: 2021-03-15

## 2020-09-24 RX ORDER — ATORVASTATIN CALCIUM 80 MG/1
80 TABLET, FILM COATED ORAL DAILY
Qty: 90 TABLET | Refills: 0 | Status: SHIPPED | OUTPATIENT
Start: 2020-09-24 | End: 2020-10-03 | Stop reason: SDUPTHER

## 2020-09-24 NOTE — TELEPHONE ENCOUNTER
LOV 9/16/19  LRF 10/1/19  RTO Scheduled    Health Maintenance   Topic Date Due    Flu vaccine (1) 09/01/2020    A1C test (Diabetic or Prediabetic)  09/11/2020    Potassium monitoring  09/11/2020    Creatinine monitoring  09/11/2020    Shingles Vaccine (1 of 2) 06/23/2021 (Originally 11/6/2013)    Annual Wellness Visit (AWV)  06/24/2021    Colon cancer screen colonoscopy  10/11/2022    Lipid screen  09/11/2024    DTaP/Tdap/Td vaccine (2 - Td) 07/12/2026    Hepatitis C screen  Completed    HIV screen  Completed    Hepatitis A vaccine  Aged Out    Hepatitis B vaccine  Aged Out    Hib vaccine  Aged Out    Meningococcal (ACWY) vaccine  Aged Out    Pneumococcal 0-64 years Vaccine  Aged Out             (applicable per patient's age: Cancer Screenings, Depression Screening, Fall Risk Screening, Immunizations)    Hemoglobin A1C (%)   Date Value   09/11/2019 5.7   11/19/2018 5.8   04/10/2018 6.0     LDL Cholesterol (mg/dL)   Date Value   09/11/2019 68     AST (U/L)   Date Value   09/11/2019 18     ALT (U/L)   Date Value   09/11/2019 15     BUN (mg/dL)   Date Value   09/11/2019 12      (goal A1C is < 7)   (goal LDL is <100) need 30-50% reduction from baseline     BP Readings from Last 3 Encounters:   06/23/20 (!) 150/88   02/19/20 114/61   02/19/20 134/84    (goal /80)      All Future Testing planned in [facility]:  Lab Frequency Next Occurrence   COVID-19 Ambulatory Once 07/13/2020       Next Visit Date:  Future Appointments   Date Time Provider Donovan Peraza   10/14/2020  1:00 PM MD Raul [facility]razasurg [facility]   10/27/2020  8:40 AM CARLOS Ortiz - CNP [place] [facility]            Patient Active Problem List:     Hyperlipidemia     Essential hypertension     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     Major depressive disorder, recurrent episode, mild (HCC)     Impaired fasting blood sugar     Umbilical hernia without obstruction and without gangrene     Dysphagia Gastroesophageal reflux disease without esophagitis     Diverticulosis of large intestine without hemorrhage     Obesity (BMI 30.0-34. 9)     Chronic pain disorder     Contusion of chest     Diverticulum, pharynx     Primary osteoarthritis     Pleural effusion     Pneumococcal pneumonia (HCC)     Prediabetes

## 2020-10-03 RX ORDER — ATORVASTATIN CALCIUM 80 MG/1
TABLET, FILM COATED ORAL
Qty: 90 TABLET | Refills: 0 | Status: SHIPPED | OUTPATIENT
Start: 2020-10-03 | End: 2021-03-30 | Stop reason: SDUPTHER

## 2020-10-12 ENCOUNTER — TELEPHONE (OUTPATIENT)
Dept: GASTROENTEROLOGY | Age: 55
End: 2020-10-12

## 2020-10-12 NOTE — TELEPHONE ENCOUNTER
10-12-20 LVM to call and confirm appointment for 10-14-20, [place], ins, id, co-pay, mask, and no more than 5 minutes early.  -jt

## 2020-10-16 NOTE — TELEPHONE ENCOUNTER
Last visit: 06/23/20  Last Med refill: 04/22/20      Next Visit Date:  Future Appointments   Date Time Provider Donovan Peraza   10/27/2020  8:40 AM CARLOS Powell CNP [place] [facility] [address] Maintenance   Topic Date Due    Flu vaccine (1) 09/01/2020    A1C test (Diabetic or Prediabetic)  09/11/2020    Lipid screen  09/11/2020    Potassium monitoring  09/11/2020    Creatinine monitoring  09/11/2020    Shingles Vaccine (1 of 2) 06/23/2021 (Originally 11/6/2013)    Annual Wellness Visit (AWV)  06/24/2021    Colon cancer screen colonoscopy  10/11/2022    DTaP/Tdap/Td vaccine (2 - Td) 07/12/2026    Hepatitis C screen  Completed    HIV screen  Completed    Hepatitis A vaccine  Aged Out    Hepatitis B vaccine  Aged Out    Hib vaccine  Aged Out    Meningococcal (ACWY) vaccine  Aged Out    Pneumococcal 0-64 years Vaccine  Aged Out       Hemoglobin A1C (%)   Date Value   09/11/2019 5.7   11/19/2018 5.8   04/10/2018 6.0             ( goal A1C is < 7)   No results found for: LABMICR  LDL Cholesterol (mg/dL)   Date Value   09/11/2019 68   04/10/2018 59       (goal LDL is <100)   AST (U/L)   Date Value   09/11/2019 18     ALT (U/L)   Date Value   09/11/2019 15     BUN (mg/dL)   Date Value   09/11/2019 12     BP Readings from Last 3 Encounters:   06/23/20 (!) 150/88   02/19/20 114/61   02/19/20 134/84          (goal 120/80)    All Future Testing planned in [facility]  Lab Frequency Next Occurrence   COVID-19 Ambulatory Once 07/13/2020               Patient Active Problem List:     Hyperlipidemia     Essential hypertension     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 9)     Chronic pain disorder     Contusion of chest     Diverticulum, pharynx     Primary osteoarthritis     Pleural effusion     Pneumococcal pneumonia (HCC)     Prediabetes

## 2020-10-27 ENCOUNTER — OFFICE VISIT (OUTPATIENT)
Dept: FAMILY MEDICINE CLINIC | Age: 55
End: 2020-10-27
Payer: MEDICARE

## 2020-10-27 VITALS
WEIGHT: 262 LBS | DIASTOLIC BLOOD PRESSURE: 84 MMHG | SYSTOLIC BLOOD PRESSURE: 138 MMHG | HEART RATE: 98 BPM | BODY MASS INDEX: 31.9 KG/M2 | TEMPERATURE: 98.1 F | OXYGEN SATURATION: 93 % | HEIGHT: 76 IN

## 2020-10-27 PROCEDURE — G0008 ADMIN INFLUENZA VIRUS VAC: HCPCS | Performed by: NURSE PRACTITIONER

## 2020-10-27 PROCEDURE — 3017F COLORECTAL CA SCREEN DOC REV: CPT | Performed by: NURSE PRACTITIONER

## 2020-10-27 PROCEDURE — 90686 IIV4 VACC NO PRSV 0.5 ML IM: CPT | Performed by: NURSE PRACTITIONER

## 2020-10-27 PROCEDURE — G8427 DOCREV CUR MEDS BY ELIG CLIN: HCPCS | Performed by: NURSE PRACTITIONER

## 2020-10-27 PROCEDURE — 99214 OFFICE O/P EST MOD 30 MIN: CPT | Performed by: NURSE PRACTITIONER

## 2020-10-27 PROCEDURE — G8417 CALC BMI ABV UP PARAM F/U: HCPCS | Performed by: NURSE PRACTITIONER

## 2020-10-27 PROCEDURE — 1036F TOBACCO NON-USER: CPT | Performed by: NURSE PRACTITIONER

## 2020-10-27 PROCEDURE — G8482 FLU IMMUNIZE ORDER/ADMIN: HCPCS | Performed by: NURSE PRACTITIONER

## 2020-10-27 ASSESSMENT — ENCOUNTER SYMPTOMS
TROUBLE SWALLOWING: 0
EYE PAIN: 0
ABDOMINAL PAIN: 0
WHEEZING: 0
DIARRHEA: 0
EYE DISCHARGE: 0
CONSTIPATION: 0
SHORTNESS OF BREATH: 0
RHINORRHEA: 0
NAUSEA: 0
SORE THROAT: 0
BACK PAIN: 1
VOMITING: 0
COUGH: 0

## 2020-10-27 NOTE — PROGRESS NOTES
Current Outpatient Medications   Medication Sig Dispense Refill    esomeprazole (NEXIUM) 20 MG delayed release capsule take 1 capsule by mouth every morning before breakfast 30 capsule 5    atorvastatin (LIPITOR) 80 MG tablet take 1 tablet by mouth once daily 90 tablet 0    losartan (COZAAR) 100 MG tablet Take 1 tablet by mouth daily 90 tablet 1    mometasone (NASONEX) 50 MCG/ACT nasal spray 2 sprays by Nasal route daily as needed (allergies) 1 Inhaler 5    hydroCHLOROthiazide (MICROZIDE) 12.5 MG capsule Take 1 capsule by mouth every morning 30 capsule 5    metFORMIN (GLUCOPHAGE) 500 MG tablet Take 1 tablet by mouth 2 times daily (with meals) 180 tablet 1    DULoxetine (CYMBALTA) 60 MG extended release capsule take 1 capsule by mouth once daily 90 capsule 1    ibuprofen (ADVIL;MOTRIN) 800 MG tablet TAKE 1 TABLET BY MOUTH EVERY 8 HOURS AS NEEDED FOR PAIN 270 tablet 1    vitamin B-12 (CYANOCOBALAMIN) 1000 MCG tablet Take 1,000 mcg by mouth daily      cetirizine (ZYRTEC) 10 MG tablet Take 10 mg by mouth daily      Cholecalciferol (VITAMIN D3) 5000 units TABS Take 5,000 Units by mouth daily       No current facility-administered medications for this visit. Patient ID: Shiv Hairston is a 62 y.o. male. Patient presents in office today for routine follow up on chronic conditions. Trying to stay home due to pandemic. Feels lazy and has no motivation to do anything. Does not monitor his BP but thinks it is running a little high. At times he gets upset really quick and feels like he is on edge. Unsure if related to his BP. Has been feeling unstable on his feet. Denies lightheadedness. Denies feeling faint. Thinks he notices more when he changes positions to quickly. Hands feel shaky almost like a tremor. Did fall about 1 week ago. Tripped over his own feet. Thinks he tweaked his right knee as it was sore but feeling better. Has gotten two shots in his right knee per Dr. Thong Velásquez.  Told he will need knee replacement but trying to get by on cortisone shots. Last injection about 2 weeks ago. Stopped taking pepcid. Had surgery at [facility] clinic on [address] to fix Zenker's diverticulum and anterior cervical exploration. Swallowing has gotten better since surgery. Hypertension   This is a chronic problem. The problem is controlled. Associated symptoms include anxiety. Pertinent negatives include no chest pain or shortness of breath. Risk factors for coronary artery disease include dyslipidemia, family history, obesity, male gender and sedentary lifestyle. Past treatments include angiotensin blockers and diuretics. Compliance problems include diet and exercise. Hyperlipidemia   This is a chronic problem. Pertinent negatives include no chest pain or shortness of breath. Current antihyperlipidemic treatment includes statins. Review of Systems   Constitutional: Negative for activity change, appetite change and fever. HENT: Negative for congestion, ear pain, rhinorrhea, sore throat and trouble swallowing. Eyes: Negative for pain, discharge and visual disturbance. Respiratory: Negative for cough, shortness of breath and wheezing. Cardiovascular: Negative for chest pain. Gastrointestinal: Negative for abdominal pain, constipation, diarrhea, nausea and vomiting. Genitourinary: Negative for dysuria. Musculoskeletal: Positive for back pain. Negative for gait problem and joint swelling. Back was feeling great but moved wrong after shower last week and was sore. Skin: Negative for rash. Neurological: Positive for dizziness. Negative for syncope, speech difficulty, weakness and light-headedness. Psychiatric/Behavioral: Positive for dysphoric mood (mood stable but down because of pandemic and weather turning cold) and sleep disturbance. The patient is nervous/anxious.           Objective:     /84 (Site: Right Upper Arm, Position: Sitting, Cuff Size: Large Adult)   Pulse 98   Temp 98.1 °F (36.7 °C)   Ht 6' 4\" (1.93 m)   Wt 262 lb (118.8 kg)   SpO2 93%   BMI 31.89 kg/m²      Physical Exam  Vitals signs and nursing note reviewed. Constitutional:       General: He is not in acute distress. Appearance: He is well-developed. HENT:      Head: Normocephalic and atraumatic. Right Ear: Tympanic membrane and external ear normal.      Left Ear: Tympanic membrane and external ear normal.      Nose: Nose normal.      Mouth/Throat:      Mouth: Mucous membranes are moist.      Pharynx: Oropharynx is clear. Eyes:      General:         Right eye: No discharge. Left eye: No discharge. Extraocular Movements: Extraocular movements intact. Pupils: Pupils are equal, round, and reactive to light. Neck:      Musculoskeletal: Neck supple. Vascular: No carotid bruit or JVD. Cardiovascular:      Rate and Rhythm: Normal rate and regular rhythm. Pulses:           Radial pulses are 2+ on the right side and 2+ on the left side. Heart sounds: Normal heart sounds. No murmur. Pulmonary:      Effort: Pulmonary effort is normal. No respiratory distress. Breath sounds: Normal breath sounds. No wheezing or rales. Abdominal:      General: Bowel sounds are normal. There is no distension. Palpations: Abdomen is soft. Tenderness: There is no abdominal tenderness. Musculoskeletal:      Right lower leg: No edema. Left lower leg: No edema. Comments: No red or swollen joints   Skin:     General: Skin is warm and dry. Findings: No rash. Neurological:      Mental Status: He is alert and oriented to person, place, and time. Motor: Motor function is intact. Coordination: Coordination is intact. Gait: Gait is intact. Psychiatric:         Mood and Affect: Mood normal.         Behavior: Behavior normal.         Thought Content: Thought content normal.         Assessment / Plan:     1. Essential hypertension    BP stable.  No medication changes at this time. Will monitor BP at home and input readings into [place]. Will complete routine labs when fasting.   - Lipid Panel; Future  - Comprehensive Metabolic Panel; Future  - [facility]    2. Pure hypercholesterolemia    Recommend healthy diet and regular exercise. Check lipid panel. Continue daily statin. - Lipid Panel; Future  - Comprehensive Metabolic Panel; Future    3. Impaired fasting blood sugar    - Comprehensive Metabolic Panel; Future  - Hemoglobin A1C; Future  - CBC; Future    4. Gastroesophageal reflux disease without esophagitis    Stable. Continue daily PPI. Follows with GI Dr Leslie Sheth.   - CBC; Future    5. Major depressive disorder, recurrent episode, mild (HCC)    Stable. No medication changes at this time. Will continue to monitor annually. 6. Vitamin D deficiency    - Vitamin D 25 Hydroxy; Future    7. B12 deficiency    - Vitamin B12; Future    8. Screening PSA (prostate specific antigen)    - Psa screening; Future    9. Need for influenza vaccination    - INFLUENZA, QUADV, 3 YRS AND OLDER, IM PF, PREFILL SYR OR SDV, 0.5ML (ESTEFANY Roberts)      Call office with concerns        Prisca Gold received counseling on the following healthy behaviors: nutrition, exercise and medication adherence  Reviewed prior labs and health maintenance. Continue current medications, diet and exercise. Discussed use, benefit, and side effects of prescribed medications. Barriers to medication compliance addressed. Patient given educational materials - see patient instructions. All patient questions answered. Patient voiced understanding.        Electronically signed by CARLOS Branham CNP on 10/27/2020 at 9:35 AM

## 2020-11-04 ENCOUNTER — OFFICE VISIT (OUTPATIENT)
Dept: GASTROENTEROLOGY | Age: 55
End: 2020-11-04
Payer: MEDICARE

## 2020-11-04 VITALS
WEIGHT: 262 LBS | RESPIRATION RATE: 18 BRPM | TEMPERATURE: 97.3 F | HEIGHT: 76 IN | SYSTOLIC BLOOD PRESSURE: 136 MMHG | DIASTOLIC BLOOD PRESSURE: 93 MMHG | BODY MASS INDEX: 31.9 KG/M2 | HEART RATE: 102 BPM

## 2020-11-04 PROCEDURE — G8427 DOCREV CUR MEDS BY ELIG CLIN: HCPCS | Performed by: INTERNAL MEDICINE

## 2020-11-04 PROCEDURE — 1036F TOBACCO NON-USER: CPT | Performed by: INTERNAL MEDICINE

## 2020-11-04 PROCEDURE — G8482 FLU IMMUNIZE ORDER/ADMIN: HCPCS | Performed by: INTERNAL MEDICINE

## 2020-11-04 PROCEDURE — G8417 CALC BMI ABV UP PARAM F/U: HCPCS | Performed by: INTERNAL MEDICINE

## 2020-11-04 PROCEDURE — 99214 OFFICE O/P EST MOD 30 MIN: CPT | Performed by: INTERNAL MEDICINE

## 2020-11-04 PROCEDURE — 3017F COLORECTAL CA SCREEN DOC REV: CPT | Performed by: INTERNAL MEDICINE

## 2020-11-04 ASSESSMENT — ENCOUNTER SYMPTOMS
ABDOMINAL PAIN: 0
VOMITING: 0
COUGH: 0
WHEEZING: 0
ANAL BLEEDING: 0
CONSTIPATION: 0
NAUSEA: 0
ABDOMINAL DISTENTION: 0
RECTAL PAIN: 0
TROUBLE SWALLOWING: 1
DIARRHEA: 0
BLOOD IN STOOL: 0
CHOKING: 0

## 2020-11-04 NOTE — PROGRESS NOTES
GI CLINIC FOLLOW UP    INTERVAL HISTORY:   No referring provider defined for this encounter. Chief Complaint   Patient presents with    Dysphagia     Patient is f/u on dysphagia. he states he is seeing [facility] and doing well           HISTORY OF PRESENT ILLNESS: Mr.Dennis IRAJ Hanna is a 62 y.o. male , referred for evaluation of*Zenker's diverticulum, GERD, history of polyps, dysphagia which has resolved now      Patient was seen at [facility] clinic we referred him to the esophageal center, because I diagnosed him with Zenker's diverticulum, the patient had some spine fusion surgery in the past he was having dysphagia, his symptoms were all consistent with Zenker's diverticulum, and we did the EGD it confirms that    Had surgery at [facility]   transcervical approach for repair of zenker diverticulum diverticulopexy to cervical prespinal fascia, and cricopharyngeal myotomy on 7/15/20. By Dr. Sarah Caceres. ARIELLE Crawford  His symptoms has completely resolved he is very happy and satisfied, no dysphagia anymore his acid reflux even better is down to 1 a day PPI, and is doing very well with that no odynophagia no dysphagia new no nausea no vomiting no regurgitation  Very satisfied at this point. No lower GI symptoms        Past Medical,Family, and Social History reviewed and does contribute to the patient presentingcondition. Patient's PMH/PSH,SH,PSYCH Hx, MEDs, ALLERGIES, and ROS were all reviewed and updated in the appropriate sections.     PAST MEDICAL HISTORY:  Past Medical History:   Diagnosis Date    Arthritis     Dizziness     History of blood transfusion     Hyperlipidemia     Hypertension     Lumbar disc herniation with radiculopathy     Nervously anxious     Osteoarthrosis of hip     Umbilical hernia        Past Surgical History:   Procedure Laterality Date    CERVICAL FUSION  2011    COLONOSCOPY  10/11/2017    normal mucosa    LUMBAR FUSION  2009    LUMBAR LAMINECTOMY  2001    RI COLONOSCOPY W/BIOPSY SINGLE/MULTIPLE N/A 10/11/2017    COLONOSCOPY WITH BIOPSY performed by Rosalie Heard MD at [address] EGD TRANSORAL BIOPSY SINGLE/MULTIPLE N/A 10/11/2017    EGD BIOPSY performed by Rosalie Heard MD at [facility]  2006    right   [address]  2005    left    UPPER GASTROINTESTINAL ENDOSCOPY  10/11/2017    gastritis    UPPER GASTROINTESTINAL ENDOSCOPY  02/19/2020    UPPER GASTROINTESTINAL ENDOSCOPY N/A 2/19/2020    EGD BIOPSY performed by Rosalie Heard MD at [address] [id number]:    Current Outpatient Medications:     esomeprazole ([address]) 20 MG delayed release capsule, take 1 capsule by mouth every morning before breakfast, Disp: 30 capsule, Rfl: 5    atorvastatin (LIPITOR) 80 MG tablet, take 1 tablet by mouth once daily, Disp: 90 tablet, Rfl: 0    losartan (COZAAR) 100 MG tablet, Take 1 tablet by mouth daily, Disp: 90 tablet, Rfl: 1    hydroCHLOROthiazide (MICROZIDE) 12.5 MG capsule, Take 1 capsule by mouth every morning, Disp: 30 capsule, Rfl: 5    metFORMIN (GLUCOPHAGE) 500 MG tablet, Take 1 tablet by mouth 2 times daily (with meals), Disp: 180 tablet, Rfl: 1    DULoxetine (CYMBALTA) 60 MG extended release capsule, take 1 capsule by mouth once daily, Disp: 90 capsule, Rfl: 1    ibuprofen (ADVIL;MOTRIN) 800 MG tablet, TAKE 1 TABLET BY MOUTH EVERY 8 HOURS AS NEEDED FOR PAIN, Disp: 270 tablet, Rfl: 1    vitamin B-12 (CYANOCOBALAMIN) 1000 MCG tablet, Take 1,000 mcg by mouth daily, Disp: , Rfl:     Cholecalciferol (VITAMIN D3) 5000 units TABS, Take 5,000 Units by mouth daily, Disp: , Rfl:     mometasone (NASONEX) 50 MCG/ACT nasal spray, 2 sprays by Nasal route daily as needed (allergies) (Patient not taking: Reported on 11/4/2020), Disp: 1 Inhaler, Rfl: 5    cetirizine (ZYRTEC) 10 MG tablet, Take 10 mg by mouth daily, Disp: , Rfl:     ALLERGIES:   No Known Allergies    FAMILY HISTORY:       Problem Relation Age of Onset    COPD Mother    Nava Cancer Father         prostate         SOCIAL HISTORY:   Social History     Socioeconomic History    Marital status:      Spouse name: Not on file    Number of children: Not on file    Years of education: Not on file    Highest education level: Not on file   Occupational History    Not on file   Social Needs    Financial resource strain: Not hard at all   [place] insecurity     Worry: Never true     Inability: Never true   [employer] needs     Medical: No     Non-medical: No   Tobacco Use    Smoking status: Never Smoker    Smokeless tobacco: Never Used   Substance and Sexual Activity    Alcohol use: Yes     Comment: social    Drug use: No    Sexual activity: Yes     Partners: Female   Lifestyle    Physical activity     Days per week: Not on file     Minutes per session: Not on file    Stress: Not on file   Relationships    Social connections     Talks on phone: Not on file     Gets together: Not on file     Attends [employer] service: Not on file     Active member of club or organization: Not on file     Attends meetings of clubs or organizations: Not on file     Relationship status: Not on file    Intimate partner violence     Fear of current or ex partner: Not on file     Emotionally abused: Not on file     Physically abused: Not on file     Forced sexual activity: Not on file   Other Topics Concern    Not on file   Social History Narrative    Not on file       REVIEW OF SYSTEMS: A 12-point review of systemswas obtained and pertinent positives and negatives were enumerated above in the history of present illness. All other reviewed systems / symptoms were negative. Review of Systems   Constitutional: Positive for fatigue. Negative for appetite change and unexpected weight change. HENT: Positive for trouble swallowing. Respiratory: Negative for cough, choking and wheezing. Cardiovascular: Negative for chest pain, palpitations and leg swelling. Gastrointestinal: Negative for abdominal distention, abdominal pain, anal bleeding, blood in stool, constipation, diarrhea, nausea, rectal pain and vomiting. Genitourinary: Negative for difficulty urinating. Allergic/Immunologic: Negative for environmental allergies and food allergies. Neurological: Negative for dizziness, weakness, light-headedness, numbness and headaches. Hematological: Bruises/bleeds easily. Psychiatric/Behavioral: Positive for sleep disturbance. The patient is nervous/anxious. LABORATORY DATA: Reviewed  Lab Results   Component Value Date    WBC 8.8 11/10/2017    HGB 13.0 11/10/2017    HCT 41.3 11/10/2017    MCV 96.0 11/10/2017     11/10/2017     09/11/2019    K 5.0 09/11/2019    CL 95 (L) 09/11/2019    CO2 29 09/11/2019    BUN 12 09/11/2019    CREATININE 0.89 09/11/2019    LABPROT 7.5 12/14/2012    LABALBU 4.5 09/11/2019    BILITOT 0.46 09/11/2019    ALKPHOS 111 09/11/2019    AST 18 09/11/2019    ALT 15 09/11/2019         Lab Results   Component Value Date    RBC 4.30 11/10/2017    HGB 13.0 11/10/2017    MCV 96.0 11/10/2017    MCH 30.2 11/10/2017    MCHC 31.5 11/10/2017    RDW 12.8 11/10/2017    MPV 9.6 11/10/2017    BASOPCT 1 02/15/2017    LYMPHSABS 2.20 02/15/2017    MONOSABS 1.20 02/15/2017    NEUTROABS 10.80 (H) 02/15/2017    EOSABS 0.30 02/15/2017    BASOSABS 0.10 02/15/2017         DIAGNOSTIC TESTING:     No results found. PHYSICAL EXAMINATION: Vital signs reviewed per the nursing documentation. BP (!) 136/93   Pulse 102   Temp 97.3 °F (36.3 °C)   Resp 18   Ht 6' 4\" (1.93 m)   Wt 262 lb (118.8 kg)   BMI 31.89 kg/m²   Body mass index is 31.89 kg/m². Physical Exam  Vitals signs and nursing note reviewed. Constitutional:       General: He is not in acute distress. Appearance: He is well-developed. He is not diaphoretic. HENT:      Head: Normocephalic and atraumatic. Eyes:      General: No scleral icterus.      Pupils: Pupils are equal, round, and reactive to light. Neck:      Musculoskeletal: Normal range of motion and neck supple. Thyroid: No thyromegaly. Vascular: No JVD. Trachea: No tracheal deviation. Cardiovascular:      Rate and Rhythm: Normal rate and regular rhythm. Heart sounds: Normal heart sounds. No murmur. Pulmonary:      Effort: Pulmonary effort is normal. No respiratory distress. Breath sounds: Normal breath sounds. No wheezing. Abdominal:      General: Bowel sounds are normal. There is no distension. Palpations: Abdomen is soft. There is no mass. Tenderness: There is no abdominal tenderness. There is no guarding or rebound. Musculoskeletal: Normal range of motion. General: No tenderness. Skin:     General: Skin is warm. Coloration: Skin is not pale. Findings: No erythema or rash. Comments: He is not diaphoretic   Neurological:      Mental Status: He is alert and oriented to person, place, and time. Deep Tendon Reflexes: Reflexes are normal and symmetric. Psychiatric:         Behavior: Behavior normal.         Thought Content: Thought content normal.         Judgment: Judgment normal.           IMPRESSION: Mr. Allegra Richardson is a 62 y.o. male with    Diagnosis Orders   1. Zenker's diverticulum     2. Dysphagia, unspecified type     3. Gastroesophageal reflux disease without esophagitis     4. Diverticulosis     5. Hx of colonic polyp         We will continue with PPI  We will watch for now  He is to report any recurrence of the symptoms  His labs are been all normal  He will need repeat  Colonoscopy 2022      Diet/life style/natural hx /complication of the dx were all explained in details   Past medical, past surgical, social history, psychiatric history, medications or allergies, all reviewed and  updated    Thank you for allowing me to participate in the care of Mr. Allegra Richardson. For any further questions please do not hesitate to contact me.     I have reviewed and agree with the ROS entered by the MA/RN. Note is dictated utilizing voice recognition software. Unfortunately this leads to occasional typographical errors. Please contact our office if you have any questions.       Rosalie Heard MD  [facility]  O: #[phone]

## 2020-11-16 ENCOUNTER — HOSPITAL ENCOUNTER (OUTPATIENT)
Age: 55
Setting detail: SPECIMEN
Discharge: HOME | End: 2020-11-16
Payer: MEDICARE

## 2020-11-16 LAB
ALBUMIN SERPL-MCNC: 4.2 G/DL (ref 3.5–5.2)
ALBUMIN/GLOBULIN RATIO: 1.3 (ref 1–2.5)
ALP BLD-CCNC: 96 U/L (ref 40–129)
ALT SERPL-CCNC: 15 U/L (ref 5–41)
ANION GAP SERPL CALCULATED.3IONS-SCNC: 15 MMOL/L (ref 9–17)
AST SERPL-CCNC: 19 U/L
BILIRUB SERPL-MCNC: 0.66 MG/DL (ref 0.3–1.2)
BUN BLDV-MCNC: 11 MG/DL (ref 6–20)
BUN/CREAT BLD: ABNORMAL (ref 9–20)
CALCIUM SERPL-MCNC: 10.2 MG/DL (ref 8.6–10.4)
CHLORIDE BLD-SCNC: 95 MMOL/L (ref 98–107)
CHOLESTEROL/HDL RATIO: 2.2
CHOLESTEROL: 186 MG/DL
CO2: 27 MMOL/L (ref 20–31)
CREAT SERPL-MCNC: 1.05 MG/DL (ref 0.7–1.2)
ESTIMATED AVERAGE GLUCOSE: 126 MG/DL
GFR AFRICAN AMERICAN: >60 ML/MIN
GFR NON-AFRICAN AMERICAN: >60 ML/MIN
GFR SERPL CREATININE-BSD FRML MDRD: ABNORMAL ML/MIN/{1.73_M2}
GFR SERPL CREATININE-BSD FRML MDRD: ABNORMAL ML/MIN/{1.73_M2}
GLUCOSE BLD-MCNC: 95 MG/DL (ref 70–99)
HBA1C MFR BLD: 6 % (ref 4–6)
HCT VFR BLD CALC: 40.5 % (ref 40.7–50.3)
HDLC SERPL-MCNC: 84 MG/DL
HEMOGLOBIN: 13.2 G/DL (ref 13–17)
LDL CHOLESTEROL: 68 MG/DL (ref 0–130)
MCH RBC QN AUTO: 31.2 PG (ref 25.2–33.5)
MCHC RBC AUTO-ENTMCNC: 32.6 G/DL (ref 28.4–34.8)
MCV RBC AUTO: 95.7 FL (ref 82.6–102.9)
NRBC AUTOMATED: 0 PER 100 WBC
PDW BLD-RTO: 13.8 % (ref 11.8–14.4)
PLATELET # BLD: 298 K/UL (ref 138–453)
PMV BLD AUTO: 10 FL (ref 8.1–13.5)
POTASSIUM SERPL-SCNC: 4.4 MMOL/L (ref 3.7–5.3)
PROSTATE SPECIFIC ANTIGEN: 0.46 UG/L
RBC # BLD: 4.23 M/UL (ref 4.21–5.77)
SODIUM BLD-SCNC: 137 MMOL/L (ref 135–144)
TOTAL PROTEIN: 7.5 G/DL (ref 6.4–8.3)
TRIGL SERPL-MCNC: 168 MG/DL
VITAMIN B-12: >2000 PG/ML (ref 232–1245)
VITAMIN D 25-HYDROXY: 66.3 NG/ML (ref 30–100)
VLDLC SERPL CALC-MCNC: ABNORMAL MG/DL (ref 1–30)
WBC # BLD: 7.4 K/UL (ref 3.5–11.3)

## 2020-11-16 RX ORDER — AMLODIPINE BESYLATE 5 MG/1
5 TABLET ORAL DAILY
Qty: 30 TABLET | Refills: 1 | Status: SHIPPED | OUTPATIENT
Start: 2020-11-16 | End: 2021-01-13 | Stop reason: SDUPTHER

## 2020-11-17 RX ORDER — DULOXETIN HYDROCHLORIDE 60 MG/1
CAPSULE, DELAYED RELEASE ORAL
Qty: 90 CAPSULE | Refills: 1 | Status: SHIPPED | OUTPATIENT
Start: 2020-11-17 | End: 2021-05-17 | Stop reason: SDUPTHER

## 2020-11-17 NOTE — TELEPHONE ENCOUNTER
Last visit: 10/27/20  Last Med refill: 02/10/20      Next Visit Date:  Future Appointments   Date Time Provider Donovan Karmen   4/27/2021  1:00 PM Guy Melgar MD [facility] AND [facility]OM[facility] [address] Maintenance   Topic Date Due    Shingles Vaccine (1 of 2) 06/23/2021 (Originally 11/6/2013)    Annual Wellness Visit (AWV)  06/24/2021    A1C test (Diabetic or Prediabetic)  11/16/2021    Lipid screen  11/16/2021    Potassium monitoring  11/16/2021    Creatinine monitoring  11/16/2021    Colon cancer screen colonoscopy  10/11/2022    DTaP/Tdap/Td vaccine (2 - Td) 07/12/2026    Flu vaccine  Completed    Hepatitis C screen  Completed    HIV screen  Completed    Hepatitis A vaccine  Aged Out    Hepatitis B vaccine  Aged Out    Hib vaccine  Aged Out    Meningococcal (ACWY) vaccine  Aged Out    Pneumococcal 0-64 years Vaccine  Aged Out       Hemoglobin A1C (%)   Date Value   11/16/2020 6.0   09/11/2019 5.7   11/19/2018 5.8             ( goal A1C is < 7)   No results found for: LABMICR  LDL Cholesterol (mg/dL)   Date Value   11/16/2020 68   09/11/2019 68       (goal LDL is <100)   AST (U/L)   Date Value   11/16/2020 19     ALT (U/L)   Date Value   11/16/2020 15     BUN (mg/dL)   Date Value   11/16/2020 11     BP Readings from Last 3 Encounters:   11/04/20 (!) 136/93   10/27/20 138/84   06/23/20 (!) 150/88          (goal 120/80)    All Future Testing planned in [facility]  Lab Frequency Next Occurrence   COVID-19 Ambulatory Once 07/13/2020               Patient Active Problem List:     Hyperlipidemia     Essential hypertension     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 9)     Chronic pain disorder     Contusion of chest     Diverticulum, pharynx     Primary osteoarthritis     Pleural effusion     Pneumococcal pneumonia (HCC)     Prediabetes

## 2020-12-16 RX ORDER — HYDROCHLOROTHIAZIDE 12.5 MG/1
12.5 CAPSULE, GELATIN COATED ORAL EVERY MORNING
Qty: 90 CAPSULE | Refills: 3 | Status: SHIPPED | OUTPATIENT
Start: 2020-12-16 | End: 2021-12-09 | Stop reason: SDUPTHER

## 2021-01-03 DIAGNOSIS — R73.01 IMPAIRED FASTING BLOOD SUGAR: ICD-10-CM

## 2021-01-04 NOTE — TELEPHONE ENCOUNTER
Last visit: 10/27/20  Last Med refill: 4/17/20  Does patient have enough medication for 72 hours: Yes    Next Visit Date:  Future Appointments   Date Time Provider Donovan Peraza   4/27/2021  1:00 PM MD Marcus Gonzalez [facility] AND [facility]OM[facility]'S [facility] [address] Maintenance   Topic Date Due    Pneumococcal 0-64 years Vaccine (1 of 1 - PPSV23) 11/06/1971    Shingles Vaccine (1 of 2) 06/23/2021 (Originally 11/6/2013)    Annual Wellness Visit (AWV)  06/24/2021    A1C test (Diabetic or Prediabetic)  11/16/2021    Lipid screen  11/16/2021    Potassium monitoring  11/16/2021    Creatinine monitoring  11/16/2021    Colon cancer screen colonoscopy  10/11/2022    DTaP/Tdap/Td vaccine (2 - Td) 07/12/2026    Flu vaccine  Completed    Hepatitis C screen  Completed    HIV screen  Completed    Hepatitis A vaccine  Aged Out    Hepatitis B vaccine  Aged Out    Hib vaccine  Aged Out    Meningococcal (ACWY) vaccine  Aged Out       Hemoglobin A1C (%)   Date Value   11/16/2020 6.0   09/11/2019 5.7   11/19/2018 5.8             ( goal A1C is < 7)   No results found for: LABMICR  LDL Cholesterol (mg/dL)   Date Value   11/16/2020 68   09/11/2019 68       (goal LDL is <100)   AST (U/L)   Date Value   11/16/2020 19     ALT (U/L)   Date Value   11/16/2020 15     BUN (mg/dL)   Date Value   11/16/2020 11     BP Readings from Last 3 Encounters:   11/04/20 (!) 136/93   10/27/20 138/84   06/23/20 (!) 150/88          (goal 120/80)    All Future Testing planned in [facility]  Lab Frequency Next Occurrence   COVID-19 Ambulatory Once 07/13/2020   Vitamin B12 Once 12/18/2020               Patient Active Problem List:     Hyperlipidemia     Essential hypertension     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     Major depressive disorder, recurrent episode, mild (HCC)     Impaired fasting blood sugar     Umbilical hernia without obstruction and without gangrene     Dysphagia     Gastroesophageal reflux disease without esophagitis

## 2021-01-13 DIAGNOSIS — I10 ESSENTIAL HYPERTENSION: ICD-10-CM

## 2021-01-13 RX ORDER — AMLODIPINE BESYLATE 5 MG/1
5 TABLET ORAL DAILY
Qty: 30 TABLET | Refills: 5 | Status: SHIPPED | OUTPATIENT
Start: 2021-01-13 | End: 2021-07-12 | Stop reason: SDUPTHER

## 2021-01-13 NOTE — TELEPHONE ENCOUNTER
Last visit: 10/27/20  Last Med refill: 11/16/20      Next Visit Date:  Future Appointments   Date Time Provider Donovan Karmen   4/27/2021  1:00 PM Alirio Roger MD [facility] AND WOMEN'S [facility] [address] Maintenance   Topic Date Due    Pneumococcal 0-64 years Vaccine (1 of 1 - PPSV23) 11[birth year]06/1969    Shingles Vaccine (1 of 2) 06/23/2021 (Originally 11/6/2013)    Annual Wellness Visit (AWV)  06/24/2021    A1C test (Diabetic or Prediabetic)  11/16/2021    Lipid screen  11/16/2021    Potassium monitoring  11/16/2021    Creatinine monitoring  11/16/2021    Colon cancer screen colonoscopy  10/11/2022    DTaP/Tdap/Td vaccine (2 - Td) 07/12/2026    Flu vaccine  Completed    Hepatitis C screen  Completed    HIV screen  Completed    Hepatitis A vaccine  Aged Out    Hepatitis B vaccine  Aged Out    Hib vaccine  Aged Out    Meningococcal (ACWY) vaccine  Aged Out       Hemoglobin A1C (%)   Date Value   11/16/2020 6.0   09/11/2019 5.7   11/19/2018 5.8             ( goal A1C is < 7)   No results found for: LABMICR  LDL Cholesterol (mg/dL)   Date Value   11/16/2020 68   09/11/2019 68       (goal LDL is <100)   AST (U/L)   Date Value   11/16/2020 19     ALT (U/L)   Date Value   11/16/2020 15     BUN (mg/dL)   Date Value   11/16/2020 11     BP Readings from Last 3 Encounters:   11/04/20 (!) 136/93   10/27/20 138/84   06/23/20 (!) 150/88          (goal 120/80)    All Future Testing planned in [facility]  Lab Frequency Next Occurrence   COVID-19 Ambulatory Once 07/13/2020   Vitamin B12 Once 12/18/2020               Patient Active Problem List:     Hyperlipidemia     Essential hypertension     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 Obesity (BMI 30.0-34. 9)     Chronic pain disorder     Contusion of chest     Diverticulum, pharynx     Primary osteoarthritis     Pleural effusion     Pneumococcal pneumonia (HCC)     Prediabetes

## 2021-03-15 DIAGNOSIS — I10 ESSENTIAL HYPERTENSION: ICD-10-CM

## 2021-03-15 RX ORDER — LOSARTAN POTASSIUM 100 MG/1
TABLET ORAL
Qty: 90 TABLET | Refills: 1 | Status: SHIPPED | OUTPATIENT
Start: 2021-03-15 | End: 2021-09-25 | Stop reason: SDUPTHER

## 2021-03-15 NOTE — TELEPHONE ENCOUNTER
Last visit: 10/27/20  Last Med refill: 9/14/20    Next Visit Date:  Future Appointments   Date Time Provider Donovan Karmen   4/27/2021  1:00 PM Alan Gonsales MD [facility] AND [facility] [address] Maintenance   Topic Date Due    Pneumococcal 0-64 years Vaccine (1 of 1 - PPSV23) Never done    COVID-19 Vaccine (1) Never done    Shingles Vaccine (1 of 2) 06/23/2021 (Originally 11/6/2013)    Annual Wellness Visit (AWV)  06/24/2021    A1C test (Diabetic or Prediabetic)  11/16/2021    Lipid screen  11/16/2021    Potassium monitoring  11/16/2021    Creatinine monitoring  11/16/2021    Colon cancer screen colonoscopy  10/11/2022    DTaP/Tdap/Td vaccine (2 - Td) 07/12/2026    Flu vaccine  Completed    Hepatitis C screen  Completed    HIV screen  Completed    Hepatitis A vaccine  Aged Out    Hepatitis B vaccine  Aged Out    Hib vaccine  Aged Out    Meningococcal (ACWY) vaccine  Aged Out       Hemoglobin A1C (%)   Date Value   11/16/2020 6.0   09/11/2019 5.7   11/19/2018 5.8             ( goal A1C is < 7)   No results found for: LABMICR  LDL Cholesterol (mg/dL)   Date Value   11/16/2020 68   09/11/2019 68       (goal LDL is <100)   AST (U/L)   Date Value   11/16/2020 19     ALT (U/L)   Date Value   11/16/2020 15     BUN (mg/dL)   Date Value   11/16/2020 11     BP Readings from Last 3 Encounters:   11/04/20 (!) 136/93   10/27/20 138/84   06/23/20 (!) 150/88          (goal 120/80)    All Future Testing planned in [facility]  Lab Frequency Next Occurrence   COVID-19 Ambulatory Once 07/13/2020               Patient Active Problem List:     Hyperlipidemia     Essential hypertension     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 9)     Chronic pain disorder     Contusion of chest     Diverticulum, pharynx     Primary osteoarthritis     Pleural effusion     Pneumococcal pneumonia (HCC)     Prediabetes

## 2021-03-30 DIAGNOSIS — E78.00 PURE HYPERCHOLESTEROLEMIA: ICD-10-CM

## 2021-03-30 RX ORDER — ATORVASTATIN CALCIUM 80 MG/1
TABLET, FILM COATED ORAL
Qty: 90 TABLET | Refills: 1 | Status: SHIPPED | OUTPATIENT
Start: 2021-03-30 | End: 2021-09-28 | Stop reason: SDUPTHER

## 2021-03-30 NOTE — TELEPHONE ENCOUNTER
LOV: 10-  LRF: 10-    Health Maintenance   Topic Date Due    Pneumococcal 0-64 years Vaccine (1 of 1 - PPSV23) Never done    Shingles Vaccine (1 of 2) 06/23/2021 (Originally 11/6/2013)    COVID-19 Vaccine (2 - Moderna 2-dose series) 04/16/2021    Annual Wellness Visit (AWV)  06/24/2021    A1C test (Diabetic or Prediabetic)  11/16/2021    Lipid screen  11/16/2021    Potassium monitoring  11/16/2021    Creatinine monitoring  11/16/2021    Colon cancer screen colonoscopy  10/11/2022    DTaP/Tdap/Td vaccine (2 - Td) 07/12/2026    Flu vaccine  Completed    Hepatitis C screen  Completed    HIV screen  Completed    Hepatitis A vaccine  Aged Out    Hepatitis B vaccine  Aged Out    Hib vaccine  Aged Out    Meningococcal (ACWY) vaccine  Aged Out             (applicable per patient's age: Cancer Screenings, Depression Screening, Fall Risk Screening, Immunizations)    Hemoglobin A1C (%)   Date Value   11/16/2020 6.0   09/11/2019 5.7   11/19/2018 5.8     LDL Cholesterol (mg/dL)   Date Value   11/16/2020 68     AST (U/L)   Date Value   11/16/2020 19     ALT (U/L)   Date Value   11/16/2020 15     BUN (mg/dL)   Date Value   11/16/2020 11      (goal A1C is < 7)   (goal LDL is <100) need 30-50% reduction from baseline     BP Readings from Last 3 Encounters:   11/04/20 (!) 136/93   10/27/20 138/84   06/23/20 (!) 150/88    (goal /80)      All Future Testing planned in [facility]:  Lab Frequency Next Occurrence   COVID-19 Ambulatory Once 07/13/2020       Next Visit Date:  Future Appointments   Date Time Provider Donovan Peraza   4/27/2021  1:00 PM [id number]            Patient Active Problem List:     Hyperlipidemia     Essential hypertension     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     Major depressive disorder, recurrent episode, mild (HCC)     Impaired fasting blood sugar     Umbilical hernia without obstruction and without gangrene     Dysphagia Gastroesophageal reflux disease without esophagitis     Diverticulosis of large intestine without hemorrhage     Obesity (BMI 30.0-34. 9)     Chronic pain disorder     Contusion of chest     Diverticulum, pharynx     Primary osteoarthritis     Pleural effusion     Pneumococcal pneumonia (HCC)     Prediabetes

## 2021-04-20 DIAGNOSIS — K21.9 GASTROESOPHAGEAL REFLUX DISEASE WITHOUT ESOPHAGITIS: ICD-10-CM

## 2021-04-20 NOTE — TELEPHONE ENCOUNTER
Last visit: 10/27/20  Last Med refill: 10/16/20    Next Visit Date:  Future Appointments   Date Time Provider Donovan Karmen   4/27/2021  1:00 PM Shonna Armas MD [facility] AND [facility] [address] Maintenance   Topic Date Due    Pneumococcal 0-64 years Vaccine (1 of 1 - PPSV23) Never done    Shingles Vaccine (1 of 2) 06/23/2021 (Originally 11/6/2013)    Annual Wellness Visit (AWV)  06/24/2021    A1C test (Diabetic or Prediabetic)  11/16/2021    Lipid screen  11/16/2021    Potassium monitoring  11/16/2021    Creatinine monitoring  11/16/2021    Colon cancer screen colonoscopy  10/11/2022    DTaP/Tdap/Td vaccine (2 - Td) 07/12/2026    Flu vaccine  Completed    COVID-19 Vaccine  Completed    Hepatitis C screen  Completed    HIV screen  Completed    Hepatitis A vaccine  Aged Out    Hepatitis B vaccine  Aged Out    Hib vaccine  Aged Out    Meningococcal (ACWY) vaccine  Aged Out       Hemoglobin A1C (%)   Date Value   11/16/2020 6.0   09/11/2019 5.7   11/19/2018 5.8             ( goal A1C is < 7)   No results found for: LABMICR  LDL Cholesterol (mg/dL)   Date Value   11/16/2020 68   09/11/2019 68       (goal LDL is <100)   AST (U/L)   Date Value   11/16/2020 19     ALT (U/L)   Date Value   11/16/2020 15     BUN (mg/dL)   Date Value   11/16/2020 11     BP Readings from Last 3 Encounters:   11/04/20 (!) 136/93   10/27/20 138/84   06/23/20 (!) 150/88          (goal 120/80)    All Future Testing planned in [facility]  Lab Frequency Next Occurrence   COVID-19 Ambulatory Once 07/13/2020               Patient Active Problem List:     Hyperlipidemia     Essential hypertension     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 Obesity (BMI 30.0-34. 9)     Chronic pain disorder     Contusion of chest     Diverticulum, pharynx     Primary osteoarthritis     Pleural effusion     Pneumococcal pneumonia (HCC)     Prediabetes

## 2021-04-27 ENCOUNTER — OFFICE VISIT (OUTPATIENT)
Dept: FAMILY MEDICINE CLINIC | Age: 56
End: 2021-04-27
Payer: MEDICARE

## 2021-04-27 VITALS
DIASTOLIC BLOOD PRESSURE: 86 MMHG | BODY MASS INDEX: 32.54 KG/M2 | HEIGHT: 76 IN | HEART RATE: 96 BPM | WEIGHT: 267.2 LBS | OXYGEN SATURATION: 96 % | SYSTOLIC BLOOD PRESSURE: 138 MMHG | TEMPERATURE: 97.4 F | RESPIRATION RATE: 18 BRPM

## 2021-04-27 DIAGNOSIS — F33.0 MAJOR DEPRESSIVE DISORDER, RECURRENT EPISODE, MILD (HCC): ICD-10-CM

## 2021-04-27 DIAGNOSIS — Z12.5 ENCOUNTER FOR SCREENING FOR MALIGNANT NEOPLASM OF PROSTATE: ICD-10-CM

## 2021-04-27 DIAGNOSIS — R53.83 OTHER FATIGUE: ICD-10-CM

## 2021-04-27 DIAGNOSIS — M19.91 PRIMARY OSTEOARTHRITIS, UNSPECIFIED SITE: Primary | ICD-10-CM

## 2021-04-27 PROCEDURE — 1036F TOBACCO NON-USER: CPT | Performed by: STUDENT IN AN ORGANIZED HEALTH CARE EDUCATION/TRAINING PROGRAM

## 2021-04-27 PROCEDURE — G8417 CALC BMI ABV UP PARAM F/U: HCPCS | Performed by: STUDENT IN AN ORGANIZED HEALTH CARE EDUCATION/TRAINING PROGRAM

## 2021-04-27 PROCEDURE — 3017F COLORECTAL CA SCREEN DOC REV: CPT | Performed by: STUDENT IN AN ORGANIZED HEALTH CARE EDUCATION/TRAINING PROGRAM

## 2021-04-27 PROCEDURE — 99214 OFFICE O/P EST MOD 30 MIN: CPT | Performed by: STUDENT IN AN ORGANIZED HEALTH CARE EDUCATION/TRAINING PROGRAM

## 2021-04-27 PROCEDURE — G8427 DOCREV CUR MEDS BY ELIG CLIN: HCPCS | Performed by: STUDENT IN AN ORGANIZED HEALTH CARE EDUCATION/TRAINING PROGRAM

## 2021-04-27 RX ORDER — IBUPROFEN 800 MG/1
800 TABLET ORAL 2 TIMES DAILY PRN
COMMUNITY

## 2021-04-27 ASSESSMENT — ENCOUNTER SYMPTOMS
SHORTNESS OF BREATH: 0
CHEST TIGHTNESS: 0
COUGH: 0
DIARRHEA: 0
SORE THROAT: 0
BACK PAIN: 0
ABDOMINAL DISTENTION: 0
WHEEZING: 0
ABDOMINAL PAIN: 0
CONSTIPATION: 0

## 2021-05-17 DIAGNOSIS — M51.26 LUMBAGO DUE TO DISPLACEMENT OF INTERVERTEBRAL DISC: ICD-10-CM

## 2021-05-17 DIAGNOSIS — F33.0 MAJOR DEPRESSIVE DISORDER, RECURRENT EPISODE, MILD (HCC): ICD-10-CM

## 2021-05-17 RX ORDER — DULOXETIN HYDROCHLORIDE 60 MG/1
CAPSULE, DELAYED RELEASE ORAL
Qty: 90 CAPSULE | Refills: 1 | Status: SHIPPED | OUTPATIENT
Start: 2021-05-17 | End: 2021-10-27

## 2021-05-17 NOTE — TELEPHONE ENCOUNTER
Last visit: 4/27/21  Last Med refill: 11/17/20    Next Visit Date:  Future Appointments   Date Time Provider Donovan Floriani   10/27/2021  1:00 PM Samara Coleman MD [facility] [address] Maintenance   Topic Date Due    Pneumococcal 0-64 years Vaccine (1 of 2 - PPSV23) Never done    Shingles Vaccine (1 of 2) 06/23/2021 (Originally 11/6/2013)    Annual Wellness Visit (AWV)  06/24/2021    A1C test (Diabetic or Prediabetic)  11/16/2021    Lipid screen  11/16/2021    Potassium monitoring  11/16/2021    Creatinine monitoring  11/16/2021    Colon cancer screen colonoscopy  10/11/2022    DTaP/Tdap/Td vaccine (2 - Td) 07/12/2026    Flu vaccine  Completed    COVID-19 Vaccine  Completed    Hepatitis C screen  Completed    HIV screen  Completed    Hepatitis A vaccine  Aged Out    Hepatitis B vaccine  Aged Out    Hib vaccine  Aged Out    Meningococcal (ACWY) vaccine  Aged Out       Hemoglobin A1C (%)   Date Value   11/16/2020 6.0   09/11/2019 5.7   11/19/2018 5.8             ( goal A1C is < 7)   No results found for: LABMICR  LDL Cholesterol (mg/dL)   Date Value   11/16/2020 68   09/11/2019 68       (goal LDL is <100)   AST (U/L)   Date Value   11/16/2020 19     ALT (U/L)   Date Value   11/16/2020 15     BUN (mg/dL)   Date Value   11/16/2020 11     BP Readings from Last 3 Encounters:   04/27/21 138/86   11/04/20 (!) 136/93   10/27/20 138/84          (goal 120/80)    All Future Testing planned in [facility]  Lab Frequency Next Occurrence   COVID-19 Ambulatory Once 07/13/2020   Testosterone Once 04/27/2021   PSA Screening Once 04/27/2021               Patient Active Problem List:     Hyperlipidemia     Essential hypertension     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 9)     Chronic pain disorder     Contusion of chest     Diverticulum, pharynx     Primary osteoarthritis     Pleural effusion     Pneumococcal pneumonia (HCC)     Prediabetes

## 2021-07-06 DIAGNOSIS — R73.01 IMPAIRED FASTING BLOOD SUGAR: ICD-10-CM

## 2021-07-06 NOTE — TELEPHONE ENCOUNTER
LRF: 01/04/2021    Health Maintenance   Topic Date Due    Pneumococcal 0-64 years Vaccine (1 of 2 - PPSV23) Never done    Shingles Vaccine (1 of 2) Never done   [employer] Visit (AWV)  Never done    Flu vaccine (1) 09/01/2021    A1C test (Diabetic or Prediabetic)  11/16/2021    Lipid screen  11/16/2021    Potassium monitoring  11/16/2021    Creatinine monitoring  11/16/2021    Colon cancer screen colonoscopy  10/11/2022    DTaP/Tdap/Td vaccine (2 - Td or Tdap) 07/12/2026    COVID-19 Vaccine  Completed    Hepatitis C screen  Completed    HIV screen  Completed    Hepatitis A vaccine  Aged Out    Hepatitis B vaccine  Aged Out    Hib vaccine  Aged Out    Meningococcal (ACWY) vaccine  Aged Out             (applicable per patient's age: Cancer Screenings, Depression Screening, Fall Risk Screening, Immunizations)    Hemoglobin A1C (%)   Date Value   11/16/2020 6.0   09/11/2019 5.7   11/19/2018 5.8     LDL Cholesterol (mg/dL)   Date Value   11/16/2020 68     AST (U/L)   Date Value   11/16/2020 19     ALT (U/L)   Date Value   11/16/2020 15     BUN (mg/dL)   Date Value   11/16/2020 11      (goal A1C is < 7)   (goal LDL is <100) need 30-50% reduction from baseline     BP Readings from Last 3 Encounters:   04/27/21 138/86   11/04/20 (!) 136/93   10/27/20 138/84    (goal /80)      All Future Testing planned in [facility]:  Lab Frequency Next Occurrence   Testosterone Once 04/27/2021   PSA Screening Once 04/27/2021       Next Visit Date:  Future Appointments   Date Time Provider Donovan Peraza   10/27/2021  1:00 PM Munir Arango [facility]            Patient Active Problem List:     Hyperlipidemia     Essential hypertension     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 9)     Chronic pain disorder     Contusion of chest     Diverticulum, pharynx     Primary osteoarthritis     Pleural effusion     Pneumococcal pneumonia (HCC)     Prediabetes

## 2021-07-12 DIAGNOSIS — I10 ESSENTIAL HYPERTENSION: ICD-10-CM

## 2021-07-12 RX ORDER — AMLODIPINE BESYLATE 5 MG/1
5 TABLET ORAL DAILY
Qty: 30 TABLET | Refills: 5 | Status: SHIPPED | OUTPATIENT
Start: 2021-07-12 | End: 2022-01-13 | Stop reason: SDUPTHER

## 2021-07-12 NOTE — TELEPHONE ENCOUNTER
LOV: 04/27/2021    LRF: 01/13/2021    Health Maintenance   Topic Date Due    Pneumococcal 0-64 years Vaccine (1 of 2 - PPSV23) Never done    Shingles Vaccine (1 of 2) Never done   [employer] Visit (AWV)  Never done    Flu vaccine (1) 09/01/2021    A1C test (Diabetic or Prediabetic)  11/16/2021    Lipid screen  11/16/2021    Potassium monitoring  11/16/2021    Creatinine monitoring  11/16/2021    Colon cancer screen colonoscopy  10/11/2022    DTaP/Tdap/Td vaccine (2 - Td or Tdap) 07/12/2026    COVID-19 Vaccine  Completed    Hepatitis C screen  Completed    HIV screen  Completed    Hepatitis A vaccine  Aged Out    Hepatitis B vaccine  Aged Out    Hib vaccine  Aged Out    Meningococcal (ACWY) vaccine  Aged Out             (applicable per patient's age: Cancer Screenings, Depression Screening, Fall Risk Screening, Immunizations)    Hemoglobin A1C (%)   Date Value   11/16/2020 6.0   09/11/2019 5.7   11/19/2018 5.8     LDL Cholesterol (mg/dL)   Date Value   11/16/2020 68     AST (U/L)   Date Value   11/16/2020 19     ALT (U/L)   Date Value   11/16/2020 15     BUN (mg/dL)   Date Value   11/16/2020 11      (goal A1C is < 7)   (goal LDL is <100) need 30-50% reduction from baseline     BP Readings from Last 3 Encounters:   04/27/21 138/86   11/04/20 (!) 136/93   10/27/20 138/84    (goal /80)      All Future Testing planned in [facility]:  Lab Frequency Next Occurrence   Testosterone Once 04/27/2021   PSA Screening Once 04/27/2021       Next Visit Date:  Future Appointments   Date Time Provider Donovan Peraza   10/27/2021  1:00 PM MD Marcus Bailey [facility]            Patient Active Problem List:     Hyperlipidemia     Essential hypertension     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 9)     Chronic pain disorder     Contusion of chest     Diverticulum, pharynx     Primary osteoarthritis     Pleural effusion     Pneumococcal pneumonia (HCC)     Prediabetes

## 2021-08-02 ENCOUNTER — HOSPITAL ENCOUNTER (OUTPATIENT)
Age: 56
Setting detail: SPECIMEN
Discharge: HOME | End: 2021-08-02
Payer: MEDICARE

## 2021-08-02 DIAGNOSIS — R53.83 OTHER FATIGUE: ICD-10-CM

## 2021-08-02 DIAGNOSIS — Z12.5 ENCOUNTER FOR SCREENING FOR MALIGNANT NEOPLASM OF PROSTATE: ICD-10-CM

## 2021-08-02 LAB
PROSTATE SPECIFIC ANTIGEN: 0.41 UG/L
TESTOSTERONE TOTAL: 261 NG/DL (ref 220–1000)
VITAMIN B-12: 597 PG/ML (ref 232–1245)

## 2021-09-25 DIAGNOSIS — I10 ESSENTIAL HYPERTENSION: ICD-10-CM

## 2021-09-28 DIAGNOSIS — E78.00 PURE HYPERCHOLESTEROLEMIA: ICD-10-CM

## 2021-09-28 RX ORDER — ATORVASTATIN CALCIUM 80 MG/1
TABLET, FILM COATED ORAL
Qty: 90 TABLET | Refills: 1 | Status: SHIPPED | OUTPATIENT
Start: 2021-09-28 | End: 2022-03-21 | Stop reason: SDUPTHER

## 2021-09-28 NOTE — TELEPHONE ENCOUNTER
Last visit: 04/27/2021  Last Med refill: 03-  Does patient have enough medication for 72 hours: No:     Next Visit Date:  Future Appointments   Date Time Provider Donovan Peraza   10/27/2021  1:00 PM MD Carson Knight. [address] Maintenance   Topic Date Due    Shingles Vaccine (1 of 2) Never done   [facility] Annual Wellness Visit (AWV)  Never done    Flu vaccine (1) 09/01/2021    A1C test (Diabetic or Prediabetic)  11/16/2021    Lipid screen  11/16/2021    Potassium monitoring  11/16/2021    Creatinine monitoring  11/16/2021    Colon cancer screen colonoscopy  10/11/2022    DTaP/Tdap/Td vaccine (2 - Td or Tdap) 07/12/2026    COVID-19 Vaccine  Completed    Hepatitis C screen  Completed    HIV screen  Completed    Hepatitis A vaccine  Aged Out    Hepatitis B vaccine  Aged Out    Hib vaccine  Aged Out    Meningococcal (ACWY) vaccine  Aged Out    Pneumococcal 0-64 years Vaccine  Aged Out       Hemoglobin A1C (%)   Date Value   11/16/2020 6.0   09/11/2019 5.7   11/19/2018 5.8             ( goal A1C is < 7)   No results found for: LABMICR  LDL Cholesterol (mg/dL)   Date Value   11/16/2020 68   09/11/2019 68       (goal LDL is <100)   AST (U/L)   Date Value   11/16/2020 19     ALT (U/L)   Date Value   11/16/2020 15     BUN (mg/dL)   Date Value   11/16/2020 11     BP Readings from Last 3 Encounters:   04/27/21 138/86   11/04/20 (!) 136/93   10/27/20 138/84          (goal 120/80)    All Future Testing planned in [facility]  Lab Frequency Next Occurrence               Patient Active Problem List:     Hyperlipidemia     Essential hypertension     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 9)     Chronic pain disorder     Contusion of chest     Diverticulum, pharynx     Primary osteoarthritis     Pleural effusion     Pneumococcal pneumonia (HCC)     Prediabetes

## 2021-09-29 RX ORDER — LOSARTAN POTASSIUM 100 MG/1
100 TABLET ORAL DAILY
Qty: 90 TABLET | Refills: 1 | Status: SHIPPED | OUTPATIENT
Start: 2021-09-29 | End: 2022-03-21 | Stop reason: SDUPTHER

## 2021-10-12 DIAGNOSIS — K21.9 GASTROESOPHAGEAL REFLUX DISEASE WITHOUT ESOPHAGITIS: ICD-10-CM

## 2021-10-12 NOTE — TELEPHONE ENCOUNTER
Last visit: 04/27/2021  Last Med refill: 04-  Does patient have enough medication for 72 hours: No:     Next Visit Date:  Future Appointments   Date Time Provider Donovan Floriani   10/27/2021  1:00 PM [facility] [address] Maintenance   Topic Date Due    Shingles Vaccine (1 of 2) Never done   [employer] Annual Wellness Visit (AWV)  06/24/2021    Flu vaccine (1) 09/01/2021    A1C test (Diabetic or Prediabetic)  11/16/2021    Lipid screen  11/16/2021    Potassium monitoring  11/16/2021    Creatinine monitoring  11/16/2021    Colon cancer screen colonoscopy  10/11/2022    DTaP/Tdap/Td vaccine (2 - Td or Tdap) 07/12/2026    COVID-19 Vaccine  Completed    Hepatitis C screen  Completed    HIV screen  Completed    Hepatitis A vaccine  Aged Out    Hepatitis B vaccine  Aged Out    Hib vaccine  Aged Out    Meningococcal (ACWY) vaccine  Aged Out    Pneumococcal 0-64 years Vaccine  Aged Out       Hemoglobin A1C (%)   Date Value   11/16/2020 6.0   09/11/2019 5.7   11/19/2018 5.8             ( goal A1C is < 7)   No results found for: LABMICR  LDL Cholesterol (mg/dL)   Date Value   11/16/2020 68   09/11/2019 68       (goal LDL is <100)   AST (U/L)   Date Value   11/16/2020 19     ALT (U/L)   Date Value   11/16/2020 15     BUN (mg/dL)   Date Value   11/16/2020 11     BP Readings from Last 3 Encounters:   04/27/21 138/86   11/04/20 (!) 136/93   10/27/20 138/84          (goal 120/80)    All Future Testing planned in [facility]  Lab Frequency Next Occurrence               Patient Active Problem List:     Hyperlipidemia     Essential hypertension     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 9)     Chronic pain disorder     Contusion of chest     Diverticulum, pharynx     Primary osteoarthritis     Pleural effusion     Pneumococcal pneumonia (HCC)     Prediabetes

## 2021-10-27 ENCOUNTER — OFFICE VISIT (OUTPATIENT)
Dept: FAMILY MEDICINE CLINIC | Age: 56
End: 2021-10-27
Payer: MEDICARE

## 2021-10-27 VITALS
OXYGEN SATURATION: 98 % | HEIGHT: 76 IN | SYSTOLIC BLOOD PRESSURE: 122 MMHG | WEIGHT: 263 LBS | TEMPERATURE: 96.7 F | RESPIRATION RATE: 14 BRPM | DIASTOLIC BLOOD PRESSURE: 78 MMHG | BODY MASS INDEX: 32.03 KG/M2 | HEART RATE: 93 BPM

## 2021-10-27 DIAGNOSIS — I10 ESSENTIAL HYPERTENSION: ICD-10-CM

## 2021-10-27 DIAGNOSIS — Z00.00 ROUTINE MEDICAL EXAM: ICD-10-CM

## 2021-10-27 DIAGNOSIS — Z23 NEED FOR IMMUNIZATION AGAINST INFLUENZA: Primary | ICD-10-CM

## 2021-10-27 DIAGNOSIS — E11.9 CONTROLLED TYPE 2 DIABETES MELLITUS WITHOUT COMPLICATION, WITHOUT LONG-TERM CURRENT USE OF INSULIN (HCC): ICD-10-CM

## 2021-10-27 LAB — HBA1C MFR BLD: 5.6 %

## 2021-10-27 PROCEDURE — 83036 HEMOGLOBIN GLYCOSYLATED A1C: CPT | Performed by: STUDENT IN AN ORGANIZED HEALTH CARE EDUCATION/TRAINING PROGRAM

## 2021-10-27 PROCEDURE — 3017F COLORECTAL CA SCREEN DOC REV: CPT | Performed by: STUDENT IN AN ORGANIZED HEALTH CARE EDUCATION/TRAINING PROGRAM

## 2021-10-27 PROCEDURE — G0008 ADMIN INFLUENZA VIRUS VAC: HCPCS | Performed by: STUDENT IN AN ORGANIZED HEALTH CARE EDUCATION/TRAINING PROGRAM

## 2021-10-27 PROCEDURE — 2022F DILAT RTA XM EVC RTNOPTHY: CPT | Performed by: STUDENT IN AN ORGANIZED HEALTH CARE EDUCATION/TRAINING PROGRAM

## 2021-10-27 PROCEDURE — 36415 COLL VENOUS BLD VENIPUNCTURE: CPT | Performed by: STUDENT IN AN ORGANIZED HEALTH CARE EDUCATION/TRAINING PROGRAM

## 2021-10-27 PROCEDURE — G8427 DOCREV CUR MEDS BY ELIG CLIN: HCPCS | Performed by: STUDENT IN AN ORGANIZED HEALTH CARE EDUCATION/TRAINING PROGRAM

## 2021-10-27 PROCEDURE — G8482 FLU IMMUNIZE ORDER/ADMIN: HCPCS | Performed by: STUDENT IN AN ORGANIZED HEALTH CARE EDUCATION/TRAINING PROGRAM

## 2021-10-27 PROCEDURE — 99213 OFFICE O/P EST LOW 20 MIN: CPT | Performed by: STUDENT IN AN ORGANIZED HEALTH CARE EDUCATION/TRAINING PROGRAM

## 2021-10-27 PROCEDURE — 3044F HG A1C LEVEL LT 7.0%: CPT | Performed by: STUDENT IN AN ORGANIZED HEALTH CARE EDUCATION/TRAINING PROGRAM

## 2021-10-27 PROCEDURE — 90686 IIV4 VACC NO PRSV 0.5 ML IM: CPT | Performed by: STUDENT IN AN ORGANIZED HEALTH CARE EDUCATION/TRAINING PROGRAM

## 2021-10-27 PROCEDURE — 1036F TOBACCO NON-USER: CPT | Performed by: STUDENT IN AN ORGANIZED HEALTH CARE EDUCATION/TRAINING PROGRAM

## 2021-10-27 PROCEDURE — G8417 CALC BMI ABV UP PARAM F/U: HCPCS | Performed by: STUDENT IN AN ORGANIZED HEALTH CARE EDUCATION/TRAINING PROGRAM

## 2021-10-27 SDOH — ECONOMIC STABILITY: FOOD INSECURITY: WITHIN THE PAST 12 MONTHS, YOU WORRIED THAT YOUR FOOD WOULD RUN OUT BEFORE YOU GOT MONEY TO BUY MORE.: NEVER TRUE

## 2021-10-27 SDOH — ECONOMIC STABILITY: FOOD INSECURITY: WITHIN THE PAST 12 MONTHS, THE FOOD YOU BOUGHT JUST DIDN'T LAST AND YOU DIDN'T HAVE MONEY TO GET MORE.: NEVER TRUE

## 2021-10-27 ASSESSMENT — PATIENT HEALTH QUESTIONNAIRE - PHQ9
1. LITTLE INTEREST OR PLEASURE IN DOING THINGS: 0
SUM OF ALL RESPONSES TO PHQ QUESTIONS 1-9: 0
2. FEELING DOWN, DEPRESSED OR HOPELESS: 0
SUM OF ALL RESPONSES TO PHQ QUESTIONS 1-9: 0
SUM OF ALL RESPONSES TO PHQ9 QUESTIONS 1 & 2: 0
SUM OF ALL RESPONSES TO PHQ QUESTIONS 1-9: 0

## 2021-10-27 ASSESSMENT — ENCOUNTER SYMPTOMS
WHEEZING: 0
SHORTNESS OF BREATH: 0
CONSTIPATION: 0
ABDOMINAL PAIN: 0
COUGH: 0
CHEST TIGHTNESS: 0
SORE THROAT: 0
ABDOMINAL DISTENTION: 0
BACK PAIN: 0
DIARRHEA: 0

## 2021-10-27 ASSESSMENT — SOCIAL DETERMINANTS OF HEALTH (SDOH): HOW HARD IS IT FOR YOU TO PAY FOR THE VERY BASICS LIKE FOOD, HOUSING, MEDICAL CARE, AND HEATING?: NOT HARD AT ALL

## 2021-10-27 NOTE — PROGRESS NOTES
Vaccine Information Sheet, \"Influenza - Inactivated\"  given to Berhane Eubanks, or parent/legal guardian of  Berhane Eubanks and verbalized understanding. Patient responses:    Have you ever had a reaction to a flu vaccine? No  Do you have any current illness? No  Have you ever had Guillian Och[place] Syndrome? No  Do you have a serious allergy to any of the following: Neomycin, Polymyxin, Thimerosal, eggs or egg products? No    Flu vaccine given per order. Please see immunization tab. Risks and benefits explained. Current VIS given.

## 2021-11-18 DIAGNOSIS — M51.26 LUMBAGO DUE TO DISPLACEMENT OF INTERVERTEBRAL DISC: ICD-10-CM

## 2021-11-18 DIAGNOSIS — F33.0 MAJOR DEPRESSIVE DISORDER, RECURRENT EPISODE, MILD (HCC): ICD-10-CM

## 2021-11-18 RX ORDER — DULOXETIN HYDROCHLORIDE 60 MG/1
CAPSULE, DELAYED RELEASE ORAL
Qty: 90 CAPSULE | Refills: 1 | OUTPATIENT
Start: 2021-11-18

## 2021-12-08 DIAGNOSIS — F33.0 MAJOR DEPRESSIVE DISORDER, RECURRENT EPISODE, MILD (HCC): ICD-10-CM

## 2021-12-08 DIAGNOSIS — I10 ESSENTIAL HYPERTENSION: ICD-10-CM

## 2021-12-08 DIAGNOSIS — M51.26 LUMBAGO DUE TO DISPLACEMENT OF INTERVERTEBRAL DISC: ICD-10-CM

## 2021-12-09 RX ORDER — DULOXETIN HYDROCHLORIDE 60 MG/1
CAPSULE, DELAYED RELEASE ORAL
Qty: 90 CAPSULE | Refills: 1 | OUTPATIENT
Start: 2021-12-09

## 2021-12-09 RX ORDER — HYDROCHLOROTHIAZIDE 12.5 MG/1
12.5 CAPSULE, GELATIN COATED ORAL EVERY MORNING
Qty: 90 CAPSULE | Refills: 3 | Status: SHIPPED | OUTPATIENT
Start: 2021-12-09 | End: 2022-11-29 | Stop reason: ALTCHOICE

## 2021-12-15 ENCOUNTER — PATIENT MESSAGE (OUTPATIENT)
Dept: FAMILY MEDICINE CLINIC | Age: 56
End: 2021-12-15

## 2021-12-15 DIAGNOSIS — F33.0 MAJOR DEPRESSIVE DISORDER, RECURRENT EPISODE, MILD (HCC): ICD-10-CM

## 2021-12-15 DIAGNOSIS — M51.26 LUMBAGO DUE TO DISPLACEMENT OF INTERVERTEBRAL DISC: ICD-10-CM

## 2021-12-15 NOTE — TELEPHONE ENCOUNTER
From: [place]  To: Dr. Susan Ackerman  Sent: 12/15/2021 1:25 PM EST  Subject: Prescription Refill    Dr Reza Alex,  Can you PLEASE refill my script for Duloxetin?     Thanks, Nora Nickerson

## 2021-12-16 DIAGNOSIS — F33.0 MAJOR DEPRESSIVE DISORDER, RECURRENT EPISODE, MILD (HCC): Primary | ICD-10-CM

## 2021-12-16 RX ORDER — DULOXETIN HYDROCHLORIDE 60 MG/1
60 CAPSULE, DELAYED RELEASE ORAL DAILY
Qty: 90 CAPSULE | Refills: 1 | Status: SHIPPED | OUTPATIENT
Start: 2021-12-16 | End: 2021-12-16

## 2021-12-16 RX ORDER — DULOXETIN HYDROCHLORIDE 60 MG/1
60 CAPSULE, DELAYED RELEASE ORAL DAILY
Qty: 90 CAPSULE | Refills: 1 | Status: SHIPPED | OUTPATIENT
Start: 2021-12-16 | End: 2022-10-12 | Stop reason: SDUPTHER

## 2022-01-13 DIAGNOSIS — I10 ESSENTIAL HYPERTENSION: ICD-10-CM

## 2022-01-14 RX ORDER — AMLODIPINE BESYLATE 5 MG/1
5 TABLET ORAL DAILY
Qty: 30 TABLET | Refills: 5 | Status: SHIPPED | OUTPATIENT
Start: 2022-01-14 | End: 2022-07-25 | Stop reason: SDUPTHER

## 2022-01-14 NOTE — TELEPHONE ENCOUNTER
LOV 10-27-21  LRF 7-12-21    Health Maintenance   Topic Date Due    Pneumococcal 0-64 years Vaccine (1 of 2 - PPSV23) Never done   [employer] Visit (AWV)  06/24/2021    COVID-19 Vaccine (3 - Booster for Moderna series) 10/16/2021    Lipid screen  11/16/2021    Potassium monitoring  11/16/2021    Creatinine monitoring  11/16/2021    Shingles Vaccine (1 of 2) 10/27/2022 (Originally 11/6/2013)    Colon cancer screen colonoscopy  10/11/2022    A1C test (Diabetic or Prediabetic)  10/27/2022    Depression Monitoring  10/27/2022    DTaP/Tdap/Td vaccine (2 - Td or Tdap) 07/12/2026    Flu vaccine  Completed    Hepatitis C screen  Completed    HIV screen  Completed    Hepatitis A vaccine  Aged Out    Hepatitis B vaccine  Aged Out    Hib vaccine  Aged Out    Meningococcal (ACWY) vaccine  Aged Out             (applicable per patient's age: Cancer Screenings, Depression Screening, Fall Risk Screening, Immunizations)    Hemoglobin A1C (%)   Date Value   10/27/2021 5.6   11/16/2020 6.0   09/11/2019 5.7     LDL Cholesterol (mg/dL)   Date Value   11/16/2020 68     AST (U/L)   Date Value   11/16/2020 19     ALT (U/L)   Date Value   11/16/2020 15     BUN (mg/dL)   Date Value   11/16/2020 11      (goal A1C is < 7)   (goal LDL is <100) need 30-50% reduction from baseline     BP Readings from Last 3 Encounters:   10/27/21 122/78   04/27/21 138/86   11/04/20 (!) 136/93    (goal /80)      All Future Testing planned in [facility]:  Lab Frequency Next Occurrence   CBC With Auto Differential Once 12/10/2021   Comprehensive Metabolic Panel, Fasting Once 12/10/2021   Lipid Panel Once 12/10/2021       Next Visit Date:  Future Appointments   Date Time Provider Donovan Peraza   10/31/2022  1:00 PM MD Marcus De La Rosa [facility] [address] [facility]            Patient Active Problem List:     Hyperlipidemia     Essential hypertension     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 9)     Chronic pain disorder     Contusion of chest     Diverticulum, pharynx     Primary osteoarthritis     Pleural effusion     Pneumococcal pneumonia (HCC)     Prediabetes

## 2022-02-02 DIAGNOSIS — R73.01 IMPAIRED FASTING BLOOD SUGAR: ICD-10-CM

## 2022-02-02 NOTE — TELEPHONE ENCOUNTER
Last visit: 10/27/21  Last Med refill: 7/7/21  Does patient have enough medication for 72 hours: Yes    Next Visit Date:  Future Appointments   Date Time Provider Donovan Peraza   10/31/2022  1:00 PM MD Carson Lang. [address] Maintenance   Topic Date Due    Pneumococcal 0-64 years Vaccine (1 of 2 - PPSV23) Never done   [employer] Visit (AWV)  06/24/2021    COVID-19 Vaccine (3 - Booster for Moderna series) 09/16/2021    Lipid screen  11/16/2021    Potassium monitoring  11/16/2021    Creatinine monitoring  11/16/2021    Shingles Vaccine (1 of 2) 10/27/2022 (Originally 11/6/2013)    Colon cancer screen colonoscopy  10/11/2022    A1C test (Diabetic or Prediabetic)  10/27/2022    Depression Monitoring  10/27/2022    DTaP/Tdap/Td vaccine (2 - Td or Tdap) 07/12/2026    Flu vaccine  Completed    Hepatitis C screen  Completed    HIV screen  Completed    Hepatitis A vaccine  Aged Out    Hepatitis B vaccine  Aged Out    Hib vaccine  Aged Out    Meningococcal (ACWY) vaccine  Aged Out       Hemoglobin A1C (%)   Date Value   10/27/2021 5.6   11/16/2020 6.0   09/11/2019 5.7             ( goal A1C is < 7)   No results found for: LABMICR  LDL Cholesterol (mg/dL)   Date Value   11/16/2020 68   09/11/2019 68       (goal LDL is <100)   AST (U/L)   Date Value   11/16/2020 19     ALT (U/L)   Date Value   11/16/2020 15     BUN (mg/dL)   Date Value   11/16/2020 11     BP Readings from Last 3 Encounters:   10/27/21 122/78   04/27/21 138/86   11/04/20 (!) 136/93          (goal 120/80)    All Future Testing planned in [facility]  Lab Frequency Next Occurrence   CBC With Auto Differential Once 12/10/2021   Comprehensive Metabolic Panel, Fasting Once 12/10/2021   Lipid Panel Once 12/10/2021               Patient Active Problem List:     Hyperlipidemia     Essential hypertension     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     Major depressive disorder, recurrent episode, mild ([facility] [address]     Impaired fasting blood sugar     Umbilical hernia without obstruction and without gangrene     Dysphagia     Gastroesophageal reflux disease without esophagitis     Diverticulosis of large intestine without hemorrhage     Obesity (BMI 30.0-34. 9)     Chronic pain disorder     Contusion of chest     Diverticulum, pharynx     Primary osteoarthritis     Pleural effusion     Pneumococcal pneumonia (HCC)     Prediabetes

## 2022-03-21 DIAGNOSIS — E78.00 PURE HYPERCHOLESTEROLEMIA: ICD-10-CM

## 2022-03-21 DIAGNOSIS — I10 ESSENTIAL HYPERTENSION: ICD-10-CM

## 2022-03-21 RX ORDER — LOSARTAN POTASSIUM 100 MG/1
100 TABLET ORAL DAILY
Qty: 90 TABLET | Refills: 1 | Status: SHIPPED | OUTPATIENT
Start: 2022-03-21 | End: 2022-09-19 | Stop reason: SDUPTHER

## 2022-03-21 RX ORDER — ATORVASTATIN CALCIUM 80 MG/1
TABLET, FILM COATED ORAL
Qty: 90 TABLET | Refills: 1 | Status: SHIPPED | OUTPATIENT
Start: 2022-03-21 | End: 2022-09-19 | Stop reason: SDUPTHER

## 2022-03-21 NOTE — TELEPHONE ENCOUNTER
Last visit: 10/27/21  Last Med refill: 9/29/21  Does patient have enough medication for 72 hours: Yes    Next Visit Date:  Future Appointments   Date Time Provider Donovan Peraza   10/31/2022  1:00 PM MD Carsno Medrano. [address] Maintenance   Topic Date Due    Pneumococcal 0-64 years Vaccine (1 of 2 - PPSV23) Never done   [employer] Visit (AWV)  06/24/2021    COVID-19 Vaccine (3 - Booster for Moderna series) 09/16/2021    Lipid screen  11/16/2021    Potassium monitoring  11/16/2021    Creatinine monitoring  11/16/2021    Shingles Vaccine (1 of 2) 10/27/2022 (Originally 11/6/2013)    Colorectal Cancer Screen  10/11/2022    A1C test (Diabetic or Prediabetic)  10/27/2022    Depression Monitoring  10/27/2022    DTaP/Tdap/Td vaccine (2 - Td or Tdap) 07/12/2026    Flu vaccine  Completed    Hepatitis C screen  Completed    HIV screen  Completed    Hepatitis A vaccine  Aged Out    Hepatitis B vaccine  Aged Out    Hib vaccine  Aged Out    Meningococcal (ACWY) vaccine  Aged Out       Hemoglobin A1C (%)   Date Value   10/27/2021 5.6   11/16/2020 6.0   09/11/2019 5.7             ( goal A1C is < 7)   No results found for: LABMICR  LDL Cholesterol (mg/dL)   Date Value   11/16/2020 68   09/11/2019 68       (goal LDL is <100)   AST (U/L)   Date Value   11/16/2020 19     ALT (U/L)   Date Value   11/16/2020 15     BUN (mg/dL)   Date Value   11/16/2020 11     BP Readings from Last 3 Encounters:   10/27/21 122/78   04/27/21 138/86   11/04/20 (!) 136/93          (goal 120/80)    All Future Testing planned in [facility]  Lab Frequency Next Occurrence   CBC With Auto Differential Once 12/10/2021   Comprehensive Metabolic Panel, Fasting Once 12/10/2021   Lipid Panel Once 12/10/2021               Patient Active Problem List:     Hyperlipidemia     Essential hypertension     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 9)     Chronic pain disorder     Contusion of chest     Diverticulum, pharynx     Primary osteoarthritis     Pleural effusion     Pneumococcal pneumonia (HCC)     Prediabetes

## 2022-04-08 DIAGNOSIS — K21.9 GASTROESOPHAGEAL REFLUX DISEASE WITHOUT ESOPHAGITIS: ICD-10-CM

## 2022-04-08 NOTE — TELEPHONE ENCOUNTER
Last visit: 10/27/21  Last Med refill: 10/12/21  Does patient have enough medication for 72 hours: Yes    Next Visit Date:  Future Appointments   Date Time Provider Donovan Peraza   10/31/2022  1:00 PM MD Daniela Curry [address] Maintenance   Topic Date Due    Pneumococcal 0-64 years Vaccine (1 of 2 - PPSV23) Never done   [employer] Visit (AWV)  06/24/2021    COVID-19 Vaccine (3 - Booster for Moderna series) 09/16/2021    Lipid screen  11/16/2021    Potassium monitoring  11/16/2021    Creatinine monitoring  11/16/2021    Shingles Vaccine (1 of 2) 10/27/2022 (Originally 11/6/2013)    Colorectal Cancer Screen  10/11/2022    A1C test (Diabetic or Prediabetic)  10/27/2022    Depression Monitoring  10/27/2022    DTaP/Tdap/Td vaccine (2 - Td or Tdap) 07/12/2026    Flu vaccine  Completed    Hepatitis C screen  Completed    HIV screen  Completed    Hepatitis A vaccine  Aged Out    Hepatitis B vaccine  Aged Out    Hib vaccine  Aged Out    Meningococcal (ACWY) vaccine  Aged Out       Hemoglobin A1C (%)   Date Value   10/27/2021 5.6   11/16/2020 6.0   09/11/2019 5.7             ( goal A1C is < 7)   No results found for: LABMICR  LDL Cholesterol (mg/dL)   Date Value   11/16/2020 68   09/11/2019 68       (goal LDL is <100)   AST (U/L)   Date Value   11/16/2020 19     ALT (U/L)   Date Value   11/16/2020 15     BUN (mg/dL)   Date Value   11/16/2020 11     BP Readings from Last 3 Encounters:   10/27/21 122/78   04/27/21 138/86   11/04/20 (!) 136/93          (goal 120/80)    All Future Testing planned in [facility]  Lab Frequency Next Occurrence   CBC With Auto Differential Once 12/10/2021   Comprehensive Metabolic Panel, Fasting Once 12/10/2021   Lipid Panel Once 12/10/2021               Patient Active Problem List:     Hyperlipidemia     Essential hypertension     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 9)     Chronic pain disorder     Contusion of chest     Diverticulum, pharynx     Primary osteoarthritis     Pleural effusion     Pneumococcal pneumonia (HCC)     Prediabetes

## 2022-05-12 NOTE — TELEPHONE ENCOUNTER
Duplicate request. Sent 2/2/20 with 180T 1RF.     Health Maintenance   Topic Date Due    Shingles Vaccine (1 of 2) 11/06/2013    Annual Wellness Visit (AWV)  05/29/2019    A1C test (Diabetic or Prediabetic)  09/11/2020    Potassium monitoring  09/11/2020    Creatinine monitoring  09/11/2020    Colon cancer screen colonoscopy  10/11/2022    Lipid screen  09/11/2024    DTaP/Tdap/Td vaccine (2 - Td) 07/12/2026    Flu vaccine  Completed    Hepatitis C screen  Completed    HIV screen  Completed    Hepatitis A vaccine  Aged Out    Hepatitis B vaccine  Aged Out    Hib vaccine  Aged Out    Meningococcal (ACWY) vaccine  Aged Out    Pneumococcal 0-64 years Vaccine  Aged Out             (applicable per patient's age: Cancer Screenings, Depression Screening, Fall Risk Screening, Immunizations)    Hemoglobin A1C (%)   Date Value   09/11/2019 5.7   11/19/2018 5.8   04/10/2018 6.0     LDL Cholesterol (mg/dL)   Date Value   09/11/2019 68     AST (U/L)   Date Value   09/11/2019 18     ALT (U/L)   Date Value   09/11/2019 15     BUN (mg/dL)   Date Value   09/11/2019 12      (goal A1C is < 7)   (goal LDL is <100) need 30-50% reduction from baseline     BP Readings from Last 3 Encounters:   02/19/20 114/61   02/19/20 134/84   01/22/20 (!) 154/89    (goal /80)      All Future Testing planned in [facility]:  Lab Frequency Next Occurrence   EGD Once 01/22/2020       Next Visit Date:  Future Appointments   Date Time Provider Donovan Peraza   6/23/2020  2:00 PM Rupal Amin, APRN - CNP [facility]            Patient Active Problem List:     Hyperlipidemia     Hypertension     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

## 2022-07-25 DIAGNOSIS — I10 ESSENTIAL HYPERTENSION: ICD-10-CM

## 2022-07-25 RX ORDER — AMLODIPINE BESYLATE 5 MG/1
5 TABLET ORAL DAILY
Qty: 30 TABLET | Refills: 0 | Status: SHIPPED | OUTPATIENT
Start: 2022-07-25 | End: 2022-09-25 | Stop reason: SDUPTHER

## 2022-07-25 NOTE — TELEPHONE ENCOUNTER
30 days sent, no additional refills  Must have appointment scheduled to establish care with another provider in office prior to next refill

## 2022-07-25 NOTE — TELEPHONE ENCOUNTER
Lvm to call office to schedule an appt with new pcp  Last visit: 10/27/21  Last Med refill: 1/14/22  Does patient have enough medication for 72 hours: Yes    Next Visit Date:  No future appointments.     Health Maintenance   Topic Date Due    Pneumococcal 0-64 years Vaccine (1 - PCV) Never done    Annual Wellness Visit (AWV)  06/24/2021    COVID-19 Vaccine (3 - Booster for Moderna series) 09/16/2021    Lipids  11/16/2021    Prostate Specific Antigen (PSA) Screening or Monitoring  08/02/2022    Shingles vaccine (1 of 2) 10/27/2022 (Originally 11/6/2013)    Flu vaccine (1) 09/01/2022    Colorectal Cancer Screen  10/11/2022    A1C test (Diabetic or Prediabetic)  10/27/2022    Depression Monitoring  10/27/2022    DTaP/Tdap/Td vaccine (2 - Td or Tdap) 07/12/2026    Hepatitis C screen  Completed    HIV screen  Completed    Hepatitis A vaccine  Aged Out    Hepatitis B vaccine  Aged Out    Hib vaccine  Aged Out    Meningococcal (ACWY) vaccine  Aged Out       Hemoglobin A1C (%)   Date Value   10/27/2021 5.6   11/16/2020 6.0   09/11/2019 5.7             ( goal A1C is < 7)   No results found for: LABMICR  LDL Cholesterol (mg/dL)   Date Value   11/16/2020 68   09/11/2019 68       (goal LDL is <100)   AST (U/L)   Date Value   11/16/2020 19     ALT (U/L)   Date Value   11/16/2020 15     BUN (mg/dL)   Date Value   11/16/2020 11     BP Readings from Last 3 Encounters:   10/27/21 122/78   04/27/21 138/86   11/04/20 (!) 136/93          (goal 120/80)    All Future Testing planned in [facility]  Lab Frequency Next Occurrence   CBC With Auto Differential Once 12/10/2021   Comprehensive Metabolic Panel, Fasting Once 12/10/2021   Lipid Panel Once 12/10/2021               Patient Active Problem List:     Hyperlipidemia     Essential hypertension     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 9)     Chronic pain disorder     Contusion of chest     Diverticulum, pharynx     Primary osteoarthritis     Pleural effusion     Pneumococcal pneumonia (HCC)     Prediabetes

## 2022-09-19 DIAGNOSIS — I10 ESSENTIAL HYPERTENSION: ICD-10-CM

## 2022-09-19 DIAGNOSIS — E78.00 PURE HYPERCHOLESTEROLEMIA: ICD-10-CM

## 2022-09-19 RX ORDER — ATORVASTATIN CALCIUM 80 MG/1
TABLET, FILM COATED ORAL
Qty: 90 TABLET | Refills: 0 | Status: SHIPPED | OUTPATIENT
Start: 2022-09-19 | End: 2023-09-19

## 2022-09-19 RX ORDER — LOSARTAN POTASSIUM 100 MG/1
100 TABLET ORAL DAILY
Qty: 90 TABLET | Refills: 0 | Status: SHIPPED | OUTPATIENT
Start: 2022-09-19 | End: 2023-09-19

## 2022-09-19 NOTE — TELEPHONE ENCOUNTER
Request for Lipitor and Losartan.     Last script sent: 3/21/22  Last OV: 10/27/21, previous Dr. Masisel Mullins patient but has an upcoming appointment with [facility]  Next Visit Date:  Future Appointments   Date Time Provider Donovan Peraza   10/12/2022  2:30 PM [facility] Maintenance   Topic Date Due    Pneumococcal 0-64 years Vaccine (1 - PCV) Never done    Annual Wellness Visit (AWV)  06/24/2021    COVID-19 Vaccine (3 - Booster for Dene Calkin series) 09/16/2021    Lipids  11/16/2021    Flu vaccine (1) 09/01/2022    Colorectal Cancer Screen  10/11/2022    Shingles vaccine (1 of 2) 10/27/2022 (Originally 11/6/2013)    A1C test (Diabetic or Prediabetic)  10/27/2022    Depression Monitoring  10/27/2022    DTaP/Tdap/Td vaccine (2 - Td or Tdap) 07/12/2026    Hepatitis C screen  Completed    HIV screen  Completed    Hepatitis A vaccine  Aged Out    Hepatitis B vaccine  Aged Out    Hib vaccine  Aged Out    Meningococcal (ACWY) vaccine  Aged Out       Hemoglobin A1C (%)   Date Value   10/27/2021 5.6   11/16/2020 6.0   09/11/2019 5.7             ( goal A1C is < 7)   No results found for: LABMICR  LDL Cholesterol (mg/dL)   Date Value   11/16/2020 68       (goal LDL is <100)   AST (U/L)   Date Value   11/16/2020 19     ALT (U/L)   Date Value   11/16/2020 15     BUN (mg/dL)   Date Value   11/16/2020 11     BP Readings from Last 3 Encounters:   10/27/21 122/78   04/27/21 138/86   11/04/20 (!) 136/93          (goal 120/80)    All Future Testing planned in [facility]  Lab Frequency Next Occurrence   CBC With Auto Differential Once 12/10/2021   Comprehensive Metabolic Panel, Fasting Once 12/10/2021   Lipid Panel Once 12/10/2021         Patient Active Problem List:     Hyperlipidemia     Essential hypertension     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 9)     Chronic pain disorder     Contusion of chest     Diverticulum, pharynx     Primary osteoarthritis     Pleural effusion     Pneumococcal pneumonia (HCC)     Prediabetes

## 2022-09-25 DIAGNOSIS — R73.01 IMPAIRED FASTING BLOOD SUGAR: ICD-10-CM

## 2022-09-25 DIAGNOSIS — I10 ESSENTIAL HYPERTENSION: ICD-10-CM

## 2022-09-26 DIAGNOSIS — I10 ESSENTIAL HYPERTENSION: ICD-10-CM

## 2022-09-26 DIAGNOSIS — R73.01 IMPAIRED FASTING BLOOD SUGAR: ICD-10-CM

## 2022-09-26 RX ORDER — AMLODIPINE BESYLATE 5 MG/1
5 TABLET ORAL DAILY
Qty: 30 TABLET | Refills: 0 | OUTPATIENT
Start: 2022-09-26 | End: 2022-10-26

## 2022-09-26 NOTE — TELEPHONE ENCOUNTER
Last visit: 10/27/21  Last Med refill: 2/2/22  Does patient have enough medication for 72 hours: no    Next Visit Date:  Future Appointments   Date Time Provider Donovan Peraza   10/12/2022  2:30 PM CARLOS Rios - CNP [facility] [address] Maintenance   Topic Date Due    Pneumococcal 0-64 years Vaccine (1 - PCV) Never done    Annual Wellness Visit (AWV)  06/24/2021    COVID-19 Vaccine (3 - Booster for [facility] series) 09/16/2021    Lipids  11/16/2021    Flu vaccine (1) 08/01/2022    Colorectal Cancer Screen  10/11/2022    Shingles vaccine (1 of 2) 10/27/2022 (Originally 11/6/2013)    A1C test (Diabetic or Prediabetic)  10/27/2022    Depression Monitoring  10/27/2022    DTaP/Tdap/Td vaccine (2 - Td or Tdap) 07/12/2026    Hepatitis C screen  Completed    HIV screen  Completed    Hepatitis A vaccine  Aged Out    Hepatitis B vaccine  Aged Out    Hib vaccine  Aged Out    Meningococcal (ACWY) vaccine  Aged Out       Hemoglobin A1C (%)   Date Value   10/27/2021 5.6   11/16/2020 6.0   09/11/2019 5.7             ( goal A1C is < 7)   No results found for: LABMICR  LDL Cholesterol (mg/dL)   Date Value   11/16/2020 68   09/11/2019 68       (goal LDL is <100)   AST (U/L)   Date Value   11/16/2020 19     ALT (U/L)   Date Value   11/16/2020 15     BUN (mg/dL)   Date Value   11/16/2020 11     BP Readings from Last 3 Encounters:   10/27/21 122/78   04/27/21 138/86   11/04/20 (!) 136/93          (goal 120/80)    All Future Testing planned in [facility]  Lab Frequency Next Occurrence   CBC With Auto Differential Once 12/10/2021   Comprehensive Metabolic Panel, Fasting Once 12/10/2021   Lipid Panel Once 12/10/2021               Patient Active Problem List:     Hyperlipidemia     Essential hypertension     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 9)     Chronic pain disorder     Contusion of chest     Diverticulum, pharynx     Primary osteoarthritis     Pleural effusion     Pneumococcal pneumonia (HCC)     Prediabetes

## 2022-09-26 NOTE — TELEPHONE ENCOUNTER
LOV 10/27/21  RTO NTP appt scheduled  LRF 7/25/22    Health Maintenance   Topic Date Due    Pneumococcal 0-64 years Vaccine (1 - PCV) Never done    Annual Wellness Visit (AWV)  06/24/2021    COVID-19 Vaccine (3 - Booster for Moderna series) 09/16/2021    Lipids  11/16/2021    Flu vaccine (1) 08/01/2022    Colorectal Cancer Screen  10/11/2022    Shingles vaccine (1 of 2) 10/27/2022 (Originally 11/6/2013)    A1C test (Diabetic or Prediabetic)  10/27/2022    Depression Monitoring  10/27/2022    DTaP/Tdap/Td vaccine (2 - Td or Tdap) 07/12/2026    Hepatitis C screen  Completed    HIV screen  Completed    Hepatitis A vaccine  Aged Out    Hepatitis B vaccine  Aged Out    Hib vaccine  Aged Out    Meningococcal (ACWY) vaccine  Aged Out             (applicable per patient's age: Cancer Screenings, Depression Screening, Fall Risk Screening, Immunizations)    Hemoglobin A1C (%)   Date Value   10/27/2021 5.6   11/16/2020 6.0   09/11/2019 5.7     LDL Cholesterol (mg/dL)   Date Value   11/16/2020 68     AST (U/L)   Date Value   11/16/2020 19     ALT (U/L)   Date Value   11/16/2020 15     BUN (mg/dL)   Date Value   11/16/2020 11      (goal A1C is < 7)   (goal LDL is <100) need 30-50% reduction from baseline     BP Readings from Last 3 Encounters:   10/27/21 122/78   04/27/21 138/86   11/04/20 (!) 136/93    (goal /80)      All Future Testing planned in [facility]:  Lab Frequency Next Occurrence   CBC With Auto Differential Once 12/10/2021   Comprehensive Metabolic Panel, Fasting Once 12/10/2021   Lipid Panel Once 12/10/2021       Next Visit Date:  Future Appointments   Date Time Provider Donovan Peraza   10/12/2022  2:30 PM CARLOS Louis - CNP [facility]            Patient Active Problem List:     Hyperlipidemia     Essential hypertension     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 9)     Chronic pain disorder     Contusion of chest     Diverticulum, pharynx     Primary osteoarthritis     Pleural effusion     Pneumococcal pneumonia (HCC)     Prediabetes

## 2022-09-27 RX ORDER — AMLODIPINE BESYLATE 5 MG/1
5 TABLET ORAL DAILY
Qty: 30 TABLET | Refills: 0 | Status: SHIPPED | OUTPATIENT
Start: 2022-09-27 | End: 2022-10-12 | Stop reason: SDUPTHER

## 2022-09-30 DIAGNOSIS — K21.9 GASTROESOPHAGEAL REFLUX DISEASE WITHOUT ESOPHAGITIS: ICD-10-CM

## 2022-09-30 NOTE — TELEPHONE ENCOUNTER
Last visit: 10/27/21  Last Med refill: 4/8/22  Does patient have enough medication for 72 hours: Yes    Next Visit Date:  Future Appointments   Date Time Provider Donovan Peraza   10/12/2022  2:30 PM Nazanin Roberts, [facility] [address] Maintenance   Topic Date Due    Pneumococcal 0-64 years Vaccine (1 - PCV) Never done    Annual Wellness Visit (AWV)  06/24/2021    COVID-19 Vaccine (3 - Booster for Lavone Osiel series) 09/16/2021    Lipids  11/16/2021    Flu vaccine (1) 08/01/2022    Colorectal Cancer Screen  10/11/2022    A1C test (Diabetic or Prediabetic)  10/27/2022    Depression Monitoring  10/27/2022    Shingles vaccine (1 of 2) 10/27/2022 (Originally 11/6/2013)    DTaP/Tdap/Td vaccine (2 - Td or Tdap) 07/12/2026    Hepatitis C screen  Completed    HIV screen  Completed    Hepatitis A vaccine  Aged Out    Hepatitis B vaccine  Aged Out    Hib vaccine  Aged Out    Meningococcal (ACWY) vaccine  Aged Out       Hemoglobin A1C (%)   Date Value   10/27/2021 5.6   11/16/2020 6.0   09/11/2019 5.7             ( goal A1C is < 7)   No results found for: LABMICR  LDL Cholesterol (mg/dL)   Date Value   11/16/2020 68   09/11/2019 68       (goal LDL is <100)   AST (U/L)   Date Value   11/16/2020 19     ALT (U/L)   Date Value   11/16/2020 15     BUN (mg/dL)   Date Value   11/16/2020 11     BP Readings from Last 3 Encounters:   10/27/21 122/78   04/27/21 138/86   11/04/20 (!) 136/93          (goal 120/80)    All Future Testing planned in [facility]  Lab Frequency Next Occurrence   CBC With Auto Differential Once 12/10/2021   Comprehensive Metabolic Panel, Fasting Once 12/10/2021   Lipid Panel Once 12/10/2021               Patient Active Problem List:     Hyperlipidemia     Essential hypertension     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 9)     Chronic pain disorder     Contusion of chest     Diverticulum, pharynx     Primary osteoarthritis     Pleural effusion     Pneumococcal pneumonia (HCC)     Prediabetes

## 2022-10-10 SDOH — HEALTH STABILITY: PHYSICAL HEALTH: ON AVERAGE, HOW MANY DAYS PER WEEK DO YOU ENGAGE IN MODERATE TO STRENUOUS EXERCISE (LIKE A BRISK WALK)?: 2 DAYS

## 2022-10-10 SDOH — HEALTH STABILITY: PHYSICAL HEALTH: ON AVERAGE, HOW MANY MINUTES DO YOU ENGAGE IN EXERCISE AT THIS LEVEL?: 30 MIN

## 2022-10-12 ENCOUNTER — OFFICE VISIT (OUTPATIENT)
Dept: FAMILY MEDICINE CLINIC | Age: 57
End: 2022-10-12
Payer: MEDICARE

## 2022-10-12 VITALS
HEIGHT: 75 IN | TEMPERATURE: 98.1 F | OXYGEN SATURATION: 93 % | BODY MASS INDEX: 34.42 KG/M2 | HEART RATE: 91 BPM | DIASTOLIC BLOOD PRESSURE: 96 MMHG | WEIGHT: 276.8 LBS | SYSTOLIC BLOOD PRESSURE: 158 MMHG

## 2022-10-12 DIAGNOSIS — Z12.5 SCREENING PSA (PROSTATE SPECIFIC ANTIGEN): ICD-10-CM

## 2022-10-12 DIAGNOSIS — F33.0 MAJOR DEPRESSIVE DISORDER, RECURRENT EPISODE, MILD (HCC): ICD-10-CM

## 2022-10-12 DIAGNOSIS — Z23 NEED FOR PNEUMOCOCCAL VACCINATION: ICD-10-CM

## 2022-10-12 DIAGNOSIS — R73.01 IMPAIRED FASTING BLOOD SUGAR: ICD-10-CM

## 2022-10-12 DIAGNOSIS — R53.83 OTHER FATIGUE: ICD-10-CM

## 2022-10-12 DIAGNOSIS — E78.00 PURE HYPERCHOLESTEROLEMIA: ICD-10-CM

## 2022-10-12 DIAGNOSIS — I10 ELEVATED BLOOD PRESSURE READING IN OFFICE WITH WHITE COAT SYNDROME, WITH DIAGNOSIS OF HYPERTENSION: ICD-10-CM

## 2022-10-12 DIAGNOSIS — E55.9 VITAMIN D DEFICIENCY: ICD-10-CM

## 2022-10-12 DIAGNOSIS — I10 ESSENTIAL HYPERTENSION: ICD-10-CM

## 2022-10-12 DIAGNOSIS — Z98.1 HX OF FUSION OF CERVICAL SPINE: ICD-10-CM

## 2022-10-12 DIAGNOSIS — Q38.7: ICD-10-CM

## 2022-10-12 DIAGNOSIS — Z23 NEED FOR INFLUENZA VACCINATION: ICD-10-CM

## 2022-10-12 DIAGNOSIS — M15.9 PRIMARY OSTEOARTHRITIS INVOLVING MULTIPLE JOINTS: ICD-10-CM

## 2022-10-12 DIAGNOSIS — E78.5 HYPERLIPIDEMIA, UNSPECIFIED HYPERLIPIDEMIA TYPE: ICD-10-CM

## 2022-10-12 DIAGNOSIS — Z76.89 ENCOUNTER TO ESTABLISH CARE: Primary | ICD-10-CM

## 2022-10-12 DIAGNOSIS — M51.26 LUMBAGO DUE TO DISPLACEMENT OF INTERVERTEBRAL DISC: ICD-10-CM

## 2022-10-12 DIAGNOSIS — K42.9 UMBILICAL HERNIA WITHOUT OBSTRUCTION AND WITHOUT GANGRENE: ICD-10-CM

## 2022-10-12 DIAGNOSIS — E11.9 CONTROLLED TYPE 2 DIABETES MELLITUS WITHOUT COMPLICATION, WITHOUT LONG-TERM CURRENT USE OF INSULIN (HCC): ICD-10-CM

## 2022-10-12 DIAGNOSIS — Z12.11 SCREENING FOR COLON CANCER: ICD-10-CM

## 2022-10-12 DIAGNOSIS — J30.2 SEASONAL ALLERGIC RHINITIS, UNSPECIFIED TRIGGER: ICD-10-CM

## 2022-10-12 DIAGNOSIS — Z96.643 HISTORY OF HIP REPLACEMENT, TOTAL, BILATERAL: ICD-10-CM

## 2022-10-12 PROBLEM — R13.10 DYSPHAGIA: Status: RESOLVED | Noted: 2017-10-09 | Resolved: 2022-10-12

## 2022-10-12 PROBLEM — S20.219A CONTUSION OF CHEST: Status: RESOLVED | Noted: 2020-06-23 | Resolved: 2022-10-12

## 2022-10-12 PROBLEM — J13 PNEUMOCOCCAL PNEUMONIA (HCC): Status: RESOLVED | Noted: 2020-06-23 | Resolved: 2022-10-12

## 2022-10-12 PROBLEM — J90 PLEURAL EFFUSION: Status: RESOLVED | Noted: 2020-06-23 | Resolved: 2022-10-12

## 2022-10-12 PROCEDURE — 90677 PCV20 VACCINE IM: CPT | Performed by: NURSE PRACTITIONER

## 2022-10-12 PROCEDURE — 2022F DILAT RTA XM EVC RTNOPTHY: CPT | Performed by: NURSE PRACTITIONER

## 2022-10-12 PROCEDURE — 3046F HEMOGLOBIN A1C LEVEL >9.0%: CPT | Performed by: NURSE PRACTITIONER

## 2022-10-12 PROCEDURE — 90674 CCIIV4 VAC NO PRSV 0.5 ML IM: CPT | Performed by: NURSE PRACTITIONER

## 2022-10-12 PROCEDURE — 3078F DIAST BP <80 MM HG: CPT | Performed by: NURSE PRACTITIONER

## 2022-10-12 PROCEDURE — G0009 ADMIN PNEUMOCOCCAL VACCINE: HCPCS | Performed by: NURSE PRACTITIONER

## 2022-10-12 PROCEDURE — G8482 FLU IMMUNIZE ORDER/ADMIN: HCPCS | Performed by: NURSE PRACTITIONER

## 2022-10-12 PROCEDURE — 1036F TOBACCO NON-USER: CPT | Performed by: NURSE PRACTITIONER

## 2022-10-12 PROCEDURE — 99214 OFFICE O/P EST MOD 30 MIN: CPT | Performed by: NURSE PRACTITIONER

## 2022-10-12 PROCEDURE — G8427 DOCREV CUR MEDS BY ELIG CLIN: HCPCS | Performed by: NURSE PRACTITIONER

## 2022-10-12 PROCEDURE — 3074F SYST BP LT 130 MM HG: CPT | Performed by: NURSE PRACTITIONER

## 2022-10-12 PROCEDURE — G8417 CALC BMI ABV UP PARAM F/U: HCPCS | Performed by: NURSE PRACTITIONER

## 2022-10-12 PROCEDURE — 3017F COLORECTAL CA SCREEN DOC REV: CPT | Performed by: NURSE PRACTITIONER

## 2022-10-12 PROCEDURE — G0008 ADMIN INFLUENZA VIRUS VAC: HCPCS | Performed by: NURSE PRACTITIONER

## 2022-10-12 RX ORDER — MONTELUKAST SODIUM 10 MG/1
10 TABLET ORAL DAILY
Qty: 30 TABLET | Refills: 3 | Status: SHIPPED | OUTPATIENT
Start: 2022-10-12

## 2022-10-12 RX ORDER — DULOXETIN HYDROCHLORIDE 60 MG/1
60 CAPSULE, DELAYED RELEASE ORAL DAILY
Qty: 90 CAPSULE | Refills: 1 | Status: SHIPPED | OUTPATIENT
Start: 2022-10-12 | End: 2023-04-10

## 2022-10-12 RX ORDER — AMLODIPINE BESYLATE 5 MG/1
10 TABLET ORAL DAILY
Qty: 180 TABLET | Refills: 0 | Status: SHIPPED | OUTPATIENT
Start: 2022-10-12 | End: 2022-11-29 | Stop reason: ALTCHOICE

## 2022-10-12 ASSESSMENT — ANXIETY QUESTIONNAIRES
GAD7 TOTAL SCORE: 2
1. FEELING NERVOUS, ANXIOUS, OR ON EDGE: 0
2. NOT BEING ABLE TO STOP OR CONTROL WORRYING: 0
5. BEING SO RESTLESS THAT IT IS HARD TO SIT STILL: 0
6. BECOMING EASILY ANNOYED OR IRRITABLE: 1
4. TROUBLE RELAXING: 0
IF YOU CHECKED OFF ANY PROBLEMS ON THIS QUESTIONNAIRE, HOW DIFFICULT HAVE THESE PROBLEMS MADE IT FOR YOU TO DO YOUR WORK, TAKE CARE OF THINGS AT HOME, OR GET ALONG WITH OTHER PEOPLE: SOMEWHAT DIFFICULT
3. WORRYING TOO MUCH ABOUT DIFFERENT THINGS: 1
7. FEELING AFRAID AS IF SOMETHING AWFUL MIGHT HAPPEN: 0

## 2022-10-12 ASSESSMENT — PATIENT HEALTH QUESTIONNAIRE - PHQ9
SUM OF ALL RESPONSES TO PHQ9 QUESTIONS 1 & 2: 2
2. FEELING DOWN, DEPRESSED OR HOPELESS: 1
SUM OF ALL RESPONSES TO PHQ QUESTIONS 1-9: 10
1. LITTLE INTEREST OR PLEASURE IN DOING THINGS: 1
3. TROUBLE FALLING OR STAYING ASLEEP: 3
SUM OF ALL RESPONSES TO PHQ QUESTIONS 1-9: 10
SUM OF ALL RESPONSES TO PHQ QUESTIONS 1-9: 10
8. MOVING OR SPEAKING SO SLOWLY THAT OTHER PEOPLE COULD HAVE NOTICED. OR THE OPPOSITE, BEING SO FIGETY OR RESTLESS THAT YOU HAVE BEEN MOVING AROUND A LOT MORE THAN USUAL: 0
7. TROUBLE CONCENTRATING ON THINGS, SUCH AS READING THE NEWSPAPER OR WATCHING TELEVISION: 1
10. IF YOU CHECKED OFF ANY PROBLEMS, HOW DIFFICULT HAVE THESE PROBLEMS MADE IT FOR YOU TO DO YOUR WORK, TAKE CARE OF THINGS AT HOME, OR GET ALONG WITH OTHER PEOPLE: 1
SUM OF ALL RESPONSES TO PHQ QUESTIONS 1-9: 10
6. FEELING BAD ABOUT YOURSELF - OR THAT YOU ARE A FAILURE OR HAVE LET YOURSELF OR YOUR FAMILY DOWN: 1
9. THOUGHTS THAT YOU WOULD BE BETTER OFF DEAD, OR OF HURTING YOURSELF: 0
5. POOR APPETITE OR OVEREATING: 0
4. FEELING TIRED OR HAVING LITTLE ENERGY: 3

## 2022-11-04 ASSESSMENT — ENCOUNTER SYMPTOMS
COUGH: 0
CONSTIPATION: 0
RHINORRHEA: 0
SHORTNESS OF BREATH: 0
DIARRHEA: 0
SORE THROAT: 0

## 2022-11-05 NOTE — PROGRESS NOTES
[facility]   [address] [place] [address]  [phone]    10/12/2022     CHIEF COMPLAINT:     Fred Velazco (:  1965) is a 60 y.o. male, here for evaluation of the following chief complaint(s):  Hypertension (1 yr f/u, previous Dr. Rika Wheeler pt)      REVIEWED INFORMATION      No Known Allergies    Current Outpatient Medications   Medication Sig Dispense Refill    VITAMIN E PO Take 1 tablet by mouth daily OTC      DULoxetine (CYMBALTA) 60 MG extended release capsule Take 1 capsule by mouth daily 90 capsule 1    montelukast (SINGULAIR) 10 MG tablet Take 1 tablet by mouth daily 30 tablet 3    amLODIPine (NORVASC) 5 MG tablet Take 2 tablets by mouth daily Pt needs to establish with new pcp for additional refills 180 tablet 0    esomeprazole (NEXIUM) 20 MG delayed release capsule take 1 capsule by mouth every morning before breakfast 90 capsule 0    metFORMIN (GLUCOPHAGE) 500 MG tablet Take 1 tablet by mouth 2 times daily (with meals) 180 tablet 1    atorvastatin (LIPITOR) 80 MG tablet take 1 tablet by mouth once daily 90 tablet 0    losartan (COZAAR) 100 MG tablet Take 1 tablet by mouth daily 90 tablet 0    hydroCHLOROthiazide (MICROZIDE) 12.5 MG capsule Take 1 capsule by mouth every morning 90 capsule 3    ibuprofen (ADVIL;MOTRIN) 800 MG tablet Take 800 mg by mouth 2 times daily as needed for Pain      mometasone (NASONEX) 50 MCG/ACT nasal spray 2 sprays by Nasal route daily as needed (allergies) 1 Inhaler 5    cetirizine (ZYRTEC) 10 MG tablet Take 10 mg by mouth daily      metFORMIN (GLUCOPHAGE) 500 MG tablet Take 1 tablet by mouth 2 times daily (with meals) 180 tablet 1     No current facility-administered medications for this visit.         Patient Care Team:  CARLOS Silveira CNP as PCP - General (Nurse Practitioner)  CARLOS Silveira CNP as PCP - [facility] Provider  Sergio Fernández MD as Consulting Physician (Infectious Diseases)  Kaila Rojas MD as Consulting Physician (Pulmonary Disease)  Hernando Hunter MD as Consulting Physician (Anesthesiology)  Barrington Willis MD as Consulting Physician (Gastroenterology)    REVIEW OF SYSTEMS:     Review of Systems   Constitutional:  Negative for activity change, fatigue and unexpected weight change. HENT:  Negative for congestion, ear pain, hearing loss, rhinorrhea and sore throat. Respiratory:  Negative for cough and shortness of breath. Cardiovascular:  Negative for chest pain, palpitations and leg swelling. Gastrointestinal:  Negative for constipation and diarrhea. Musculoskeletal:  Negative for arthralgias and gait problem. Neurological:  Negative for dizziness, weakness and headaches. Psychiatric/Behavioral:  Negative for confusion. The patient is not nervous/anxious. HISTORY OF PRESENT ILLNESS     Patient presents today to establish care with new provider patient previous Darrell Mass was seen by Dr. Mirna Dueñas. Hypertension  Blood pressure evaluated today is read today. He is currently taking 5 mg of amlodipine. Reports that he has significant history of whitecoat syndrome. Medication review has been completed as documented. Patient is currently taking medication as prescribed or as indicated. Patient denies any chest pain, shortness of breath, or palpitations. Patient is overall stable. PHYSICAL EXAM:     BP (!) 158/96 (Site: Left Upper Arm, Position: Sitting, Cuff Size: Medium Adult)   Pulse 91   Temp 98.1 °F (36.7 °C) (Tympanic)   Ht 6' 3.25\" (1.911 m)   Wt 276 lb 12.8 oz (125.6 kg)   SpO2 93%   BMI 34.37 kg/m²      Physical Exam  Vitals reviewed. Constitutional:       Appearance: Normal appearance. He is not ill-appearing. Cardiovascular:      Rate and Rhythm: Normal rate and regular rhythm. Heart sounds: No murmur heard. No friction rub. No gallop. Pulmonary:      Effort: Pulmonary effort is normal. No respiratory distress. Breath sounds: No wheezing.    Abdominal: General: Bowel sounds are normal.      Palpations: Abdomen is soft. Tenderness: There is no abdominal tenderness. Musculoskeletal:      Right lower leg: No edema. Left lower leg: No edema. Skin:     General: Skin is warm. Findings: No erythema, lesion or rash. Neurological:      Mental Status: He is alert. Psychiatric:         Mood and Affect: Mood normal.         Behavior: Behavior is cooperative. PROCEDURE/ IN OFFICE TESTING/ LAB REVIEW     No in office testing or procedures completed during today's office visit. ASSESSMENT/PLAN/ FOLLOWUP:     PLEASE NOTE THAT ANY DISCONTINUATION OF MEDICATIONS OR MEDICAL SUPPLIES REFLECTED IN TODAY'S VISIT SUMMARY  MAY NOT HAVE COMPLETED AS A CHANGE IN YOUR PLAN OF CARE. THESE CHANGES MAY HAVE ONLY BEEN DONE SO IN ORDER TO CLEAN UP LIST FROM DUPLICATIONS OR MISCELLANEOUS SUPPLIES ONLY NEEDED PERIODIC REORDERS. DO NOT DISCONTINUE MEDICATIONS LISTED UNLESS SPECIFICALLY DISCUSSED IN YOUR APPOINTMENT WITH PROVIDER OR SPECIALIST, IF YOU HAVE AN QUESTIONS, PLEASE CONTACT YOUR PROVIDER FOR CLARIFICATION IF NOT ADDRESSED IN YOUR PLAN OF CARE. 1. Encounter to establish care  2. Need for influenza vaccination  -     Influenza, FLUCELVAX, (age 10 mo+), IM, Preservative Free, 0.5 mL  3. Need for pneumococcal vaccination  -     Pneumococcal, PCV20, PREVNAR 21, (age 25 yrs+), IM, PF  4. Impaired fasting blood sugar  -     Hemoglobin A1C; Future  5. Hyperlipidemia, unspecified hyperlipidemia type  6. Screening for colon cancer  -     [facility] Screening Colonoscopy  7. Controlled type 2 diabetes mellitus without complication, without long-term current use of insulin ([facility] [address]  8. Major depressive disorder, recurrent episode, mild (HCC)  -     DULoxetine (CYMBALTA) 60 MG extended release capsule; Take 1 capsule by mouth daily, Disp-90 capsule, R-1Normal  9. Essential hypertension  -     CBC; Future  -     amLODIPine (NORVASC) 5 MG tablet;  Take 2 tablets by mouth daily Pt needs to establish with new pcp for additional refills, Disp-180 tablet, R-0Increased dosage. Normal  10. Pure hypercholesterolemia  -     CBC; Future  11. Vitamin D deficiency  -     Vitamin D 25 Hydroxy; Future  12. Screening PSA (prostate specific antigen)  -     PSA Screening; Future  13. Elevated blood pressure reading in office with white coat syndrome, with diagnosis of hypertension  14. Diverticulum, pharynx  15. Other fatigue  -     Testosterone; Future  16. 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 History of hip replacement, total, bilateral  18. Hx of fusion of cervical spine  19. Primary osteoarthritis involving multiple joints  20. Umbilical hernia without obstruction and without gangrene  21. Seasonal allergic rhinitis, unspecified trigger  -     montelukast (SINGULAIR) 10 MG tablet; Take 1 tablet by mouth daily, Disp-30 tablet, R-3Normal      Return in about 6 weeks (around 11/23/2022) for Blood pressure check/ hypertension. COMMUNICATION:       On this date 10/12/2022 I have spent 30 minutes reviewing previous notes, test results and face to face with the patient discussing the diagnosis and importance of compliance with the treatment plan as well as documenting on the day of the visit. The best way to find yourself is to lose yourself in the service of others - [address] [place] [address]. [address] [place] [address]   [email or web address]  Office: [phone]     An electronic signature was used to authenticate this note.   Signed by CARLOS Dumont CNP, APRN-CNP on 11/4/2022 at 11:19 PM

## 2022-11-10 ENCOUNTER — HOSPITAL ENCOUNTER (OUTPATIENT)
Age: 57
Setting detail: SPECIMEN
Discharge: HOME | End: 2022-11-10

## 2022-11-10 DIAGNOSIS — Z12.5 SCREENING PSA (PROSTATE SPECIFIC ANTIGEN): ICD-10-CM

## 2022-11-10 DIAGNOSIS — R53.83 OTHER FATIGUE: ICD-10-CM

## 2022-11-10 DIAGNOSIS — I10 ESSENTIAL HYPERTENSION: ICD-10-CM

## 2022-11-10 DIAGNOSIS — E55.9 VITAMIN D DEFICIENCY: ICD-10-CM

## 2022-11-10 DIAGNOSIS — E78.00 PURE HYPERCHOLESTEROLEMIA: ICD-10-CM

## 2022-11-10 DIAGNOSIS — R73.01 IMPAIRED FASTING BLOOD SUGAR: ICD-10-CM

## 2022-11-10 LAB
ESTIMATED AVERAGE GLUCOSE: 126 MG/DL
HBA1C MFR BLD: 6 % (ref 4–6)
HCT VFR BLD CALC: 40.2 % (ref 40.7–50.3)
HEMOGLOBIN: 13.3 G/DL (ref 13–17)
MCH RBC QN AUTO: 31.9 PG (ref 25.2–33.5)
MCHC RBC AUTO-ENTMCNC: 33.1 G/DL (ref 28.4–34.8)
MCV RBC AUTO: 96.4 FL (ref 82.6–102.9)
NRBC AUTOMATED: 0 PER 100 WBC
PDW BLD-RTO: 14.6 % (ref 11.8–14.4)
PLATELET # BLD: 343 K/UL (ref 138–453)
PMV BLD AUTO: 10.1 FL (ref 8.1–13.5)
PROSTATE SPECIFIC ANTIGEN: 0.45 NG/ML
RBC # BLD: 4.17 M/UL (ref 4.21–5.77)
TESTOSTERONE TOTAL: 177 NG/DL (ref 220–1000)
VITAMIN D 25-HYDROXY: 54.5 NG/ML
WBC # BLD: 8.9 K/UL (ref 3.5–11.3)

## 2022-11-14 DIAGNOSIS — F33.0 MAJOR DEPRESSIVE DISORDER, RECURRENT EPISODE, MILD (HCC): ICD-10-CM

## 2022-11-14 DIAGNOSIS — M51.26 LUMBAGO DUE TO DISPLACEMENT OF INTERVERTEBRAL DISC: ICD-10-CM

## 2022-11-14 RX ORDER — DULOXETIN HYDROCHLORIDE 60 MG/1
60 CAPSULE, DELAYED RELEASE ORAL DAILY
Qty: 90 CAPSULE | Refills: 1 | OUTPATIENT
Start: 2022-11-14 | End: 2023-05-13

## 2022-11-28 DIAGNOSIS — R53.83 OTHER FATIGUE: ICD-10-CM

## 2022-11-28 DIAGNOSIS — R79.89 LOW SERUM TESTOSTERONE: Primary | ICD-10-CM

## 2022-11-29 ENCOUNTER — OFFICE VISIT (OUTPATIENT)
Dept: FAMILY MEDICINE CLINIC | Age: 57
End: 2022-11-29

## 2022-11-29 VITALS
TEMPERATURE: 98 F | OXYGEN SATURATION: 98 % | WEIGHT: 279 LBS | DIASTOLIC BLOOD PRESSURE: 86 MMHG | HEART RATE: 84 BPM | BODY MASS INDEX: 34.64 KG/M2 | SYSTOLIC BLOOD PRESSURE: 130 MMHG

## 2022-11-29 DIAGNOSIS — R22.43 LOCALIZED SWELLING OF BOTH LOWER LEGS: ICD-10-CM

## 2022-11-29 DIAGNOSIS — E78.00 PURE HYPERCHOLESTEROLEMIA: ICD-10-CM

## 2022-11-29 DIAGNOSIS — I10 ESSENTIAL HYPERTENSION: Primary | ICD-10-CM

## 2022-11-29 RX ORDER — DILTIAZEM HYDROCHLORIDE 180 MG/1
180 CAPSULE, COATED, EXTENDED RELEASE ORAL DAILY
Qty: 30 CAPSULE | Refills: 1 | Status: SHIPPED | OUTPATIENT
Start: 2022-11-29 | End: 2022-12-29

## 2022-11-29 RX ORDER — HYDROCHLOROTHIAZIDE 25 MG/1
25 TABLET ORAL EVERY MORNING
Qty: 30 TABLET | Refills: 1 | Status: SHIPPED | OUTPATIENT
Start: 2022-11-29 | End: 2022-12-29

## 2022-11-29 SDOH — ECONOMIC STABILITY: FOOD INSECURITY: WITHIN THE PAST 12 MONTHS, YOU WORRIED THAT YOUR FOOD WOULD RUN OUT BEFORE YOU GOT MONEY TO BUY MORE.: NEVER TRUE

## 2022-11-29 SDOH — ECONOMIC STABILITY: FOOD INSECURITY: WITHIN THE PAST 12 MONTHS, THE FOOD YOU BOUGHT JUST DIDN'T LAST AND YOU DIDN'T HAVE MONEY TO GET MORE.: NEVER TRUE

## 2022-11-29 ASSESSMENT — PATIENT HEALTH QUESTIONNAIRE - PHQ9
1. LITTLE INTEREST OR PLEASURE IN DOING THINGS: 1
7. TROUBLE CONCENTRATING ON THINGS, SUCH AS READING THE NEWSPAPER OR WATCHING TELEVISION: 0
4. FEELING TIRED OR HAVING LITTLE ENERGY: 1
SUM OF ALL RESPONSES TO PHQ QUESTIONS 1-9: 5
SUM OF ALL RESPONSES TO PHQ QUESTIONS 1-9: 5
3. TROUBLE FALLING OR STAYING ASLEEP: 1
10. IF YOU CHECKED OFF ANY PROBLEMS, HOW DIFFICULT HAVE THESE PROBLEMS MADE IT FOR YOU TO DO YOUR WORK, TAKE CARE OF THINGS AT HOME, OR GET ALONG WITH OTHER PEOPLE: 0
8. MOVING OR SPEAKING SO SLOWLY THAT OTHER PEOPLE COULD HAVE NOTICED. OR THE OPPOSITE, BEING SO FIGETY OR RESTLESS THAT YOU HAVE BEEN MOVING AROUND A LOT MORE THAN USUAL: 0
6. FEELING BAD ABOUT YOURSELF - OR THAT YOU ARE A FAILURE OR HAVE LET YOURSELF OR YOUR FAMILY DOWN: 1
2. FEELING DOWN, DEPRESSED OR HOPELESS: 1
5. POOR APPETITE OR OVEREATING: 0
9. THOUGHTS THAT YOU WOULD BE BETTER OFF DEAD, OR OF HURTING YOURSELF: 0
SUM OF ALL RESPONSES TO PHQ9 QUESTIONS 1 & 2: 2
SUM OF ALL RESPONSES TO PHQ QUESTIONS 1-9: 5
SUM OF ALL RESPONSES TO PHQ QUESTIONS 1-9: 5

## 2022-11-29 ASSESSMENT — SOCIAL DETERMINANTS OF HEALTH (SDOH): HOW HARD IS IT FOR YOU TO PAY FOR THE VERY BASICS LIKE FOOD, HOUSING, MEDICAL CARE, AND HEATING?: NOT HARD AT ALL

## 2022-11-29 NOTE — PATIENT INSTRUCTIONS
Double your HCTZ - for the next 4 days - if swelling resolve return to 12.5 mg with the new medication. If the swelling continues but is improved with 25 mg dosage continue with that dosage. I will have you repeat your blood work prior to seeing me again to check your potassium and kidney function      Discontinue use of Amlodipine due to swelling - start the Diltiazem at 180 mg daily in am.     Monitor blood pressure at home and notify me of any changes.

## 2022-11-29 NOTE — PROGRESS NOTES
[facility]   [address]   [phone]    2022     CHIEF COMPLAINT:     Sania French (:  1965) is a 59 y.o. male, here for evaluation of the following chief complaint(s):  Discuss Labs and Hypertension      REVIEWED INFORMATION      No Known Allergies    Current Outpatient Medications   Medication Sig Dispense Refill    dilTIAZem (CARDIZEM CD) 180 MG extended release capsule Take 1 capsule by mouth daily 30 capsule 1    hydroCHLOROthiazide (HYDRODIURIL) 25 MG tablet Take 1 tablet by mouth every morning 30 tablet 1    VITAMIN E PO Take 1 tablet by mouth daily OTC      DULoxetine (CYMBALTA) 60 MG extended release capsule Take 1 capsule by mouth daily 90 capsule 1    montelukast (SINGULAIR) 10 MG tablet Take 1 tablet by mouth daily 30 tablet 3    esomeprazole (NEXIUM) 20 MG delayed release capsule take 1 capsule by mouth every morning before breakfast 90 capsule 0    metFORMIN (GLUCOPHAGE) 500 MG tablet Take 1 tablet by mouth 2 times daily (with meals) 180 tablet 1    atorvastatin (LIPITOR) 80 MG tablet take 1 tablet by mouth once daily 90 tablet 0    losartan (COZAAR) 100 MG tablet Take 1 tablet by mouth daily 90 tablet 0    ibuprofen (ADVIL;MOTRIN) 800 MG tablet Take 800 mg by mouth 2 times daily as needed for Pain      mometasone (NASONEX) 50 MCG/ACT nasal spray 2 sprays by Nasal route daily as needed (allergies) 1 Inhaler 5    cetirizine (ZYRTEC) 10 MG tablet Take 10 mg by mouth daily       No current facility-administered medications for this visit.         Patient Care Team:  CARLOS Vasquez CNP as PCP - General (Nurse Practitioner)  CARLOS Vasquez CNP as PCP - [facility] Empaneled Provider  Gustabo Mackenzie MD as Consulting Physician (Infectious Diseases)  Kirt Lopez MD as Consulting Physician (Pulmonary Disease)  Mitch Ogden MD as Consulting Physician (Anesthesiology)  Connie Krishnan MD as Consulting Physician (Gastroenterology)    REVIEW OF SYSTEMS:     Review of Systems   Constitutional:  Negative for activity change, fatigue and unexpected weight change. HENT:  Negative for congestion, ear pain, hearing loss, rhinorrhea and sore throat. Respiratory:  Negative for cough and shortness of breath. Cardiovascular:  Positive for leg swelling. Negative for chest pain and palpitations. Gastrointestinal:  Negative for constipation and diarrhea. Musculoskeletal:  Negative for arthralgias and gait problem. Neurological:  Negative for dizziness, weakness and headaches. Psychiatric/Behavioral:  Negative for confusion. The patient is not nervous/anxious. HISTORY OF PRESENT ILLNESS     Hypertension  Blood pressure evaluated today is stable. Medication review has been completed as documented. Patient is currently taking medication as prescribed or as indicated. Patient denies any chest pain, shortness of breath, or palpitations. Patient is overall stable. \    Noting that with patient's increase amlodipine patient now has increased leg swelling. He is currently taking 10 mg of amlodipine with 12.5 of hydrochlorothiazide I have further discussion with patient he is blood pressure is better controlled than it was previously so a calcium channel blocker would be pertinent for him to start but we will switch his calcium channel blocker to Cardizem and increase his dosage of hydrochlorothiazide to help relieve his current leg swelling. Patient has been advised to increase his dosage to 25 mg. Once leg swelling has improved he can reduce back down to half a tablet and we will resume if there were leg swelling starts to return I would like patient to increase his dosage to 25 mg and maintain that till he follows back with me for further evaluation.     PHYSICAL EXAM:     /86   Pulse 84   Temp 98 °F (36.7 °C) (Tympanic)   Wt 279 lb (126.6 kg)   SpO2 98%   BMI 34.64 kg/m²      Physical Exam  Constitutional: Appearance: Normal appearance. He is well-developed. He is not ill-appearing. Eyes:      General:         Right eye: No discharge. Left eye: No discharge. Extraocular Movements: Extraocular movements intact. Conjunctiva/sclera: Conjunctivae normal.   Neck:      Thyroid: No thyroid mass. Vascular: No JVD. Pulmonary:      Effort: Pulmonary effort is normal. No respiratory distress. Musculoskeletal:      Right shoulder: No deformity. Normal range of motion. Left shoulder: No deformity. Normal range of motion. Cervical back: Normal range of motion. Right lower le+ Edema present. Left lower le+ Edema present. Skin:     General: Skin is moist.      Coloration: Skin is not cyanotic or jaundiced. Findings: No rash. Neurological:      General: No focal deficit present. Mental Status: He is alert and oriented to person, place, and time. Gait: Gait is intact. Psychiatric:         Attention and Perception: Attention normal. He does not perceive auditory or visual hallucinations. Mood and Affect: Mood normal.         Speech: Speech normal.        PROCEDURE/ IN OFFICE TESTING/ LAB REVIEW     No in office testing or procedures completed during today's office visit. No results found for this or any previous visit (from the past 168 hour(s)). ASSESSMENT/PLAN/ FOLLOWUP:     PLEASE NOTE THAT ANY DISCONTINUATION OF MEDICATIONS OR MEDICAL SUPPLIES REFLECTED IN TODAY'S VISIT SUMMARY  MAY NOT HAVE COMPLETED AS A CHANGE IN YOUR PLAN OF CARE. THESE CHANGES MAY HAVE ONLY BEEN DONE SO IN ORDER TO CLEAN UP LIST FROM DUPLICATIONS OR MISCELLANEOUS SUPPLIES ONLY NEEDED PERIODIC REORDERS. DO NOT DISCONTINUE MEDICATIONS LISTED UNLESS SPECIFICALLY DISCUSSED IN YOUR APPOINTMENT WITH PROVIDER OR SPECIALIST, IF YOU HAVE AN QUESTIONS, PLEASE CONTACT YOUR PROVIDER FOR CLARIFICATION IF NOT ADDRESSED IN YOUR PLAN OF CARE.      1. Essential hypertension  - Comprehensive Metabolic Panel, Fasting; Future  -     dilTIAZem (CARDIZEM CD) 180 MG extended release capsule; Take 1 capsule by mouth daily, Disp-30 capsule, R-1Normal  -     hydroCHLOROthiazide (HYDRODIURIL) 25 MG tablet; Take 1 tablet by mouth every morning, Disp-30 tablet, R-1Discontinue lower dosage of HCTZ -Normal  -     Basic Metabolic Panel; Future  2. Localized swelling of both lower legs  -     hydroCHLOROthiazide (HYDRODIURIL) 25 MG tablet; Take 1 tablet by mouth every morning, Disp-30 tablet, R-1Discontinue lower dosage of HCTZ -Normal  3. Pure hypercholesterolemia  -     Lipid Panel; Future    Return in about 5 weeks (around 1/3/2023) for Blood pressure check/ hypertension. COMMUNICATION:       On this date 11/29/2022 I have spent 30 minutes reviewing previous notes, test results and face to face with the patient discussing the diagnosis and importance of compliance with the treatment plan as well as documenting on the day of the visit. The best way to find yourself is to lose yourself in the service of others - [address] [place] [address]. [address] [facility]   [email or web address]  Office: [phone]     An electronic signature was used to authenticate this note.   Signed by CARLOS Beal CNP, APRN-CNP on 12/4/2022 at 5:19 PM

## 2022-12-04 ASSESSMENT — ENCOUNTER SYMPTOMS
SORE THROAT: 0
COUGH: 0
DIARRHEA: 0
SHORTNESS OF BREATH: 0
RHINORRHEA: 0
CONSTIPATION: 0

## 2022-12-07 ENCOUNTER — HOSPITAL ENCOUNTER (OUTPATIENT)
Age: 57
Setting detail: SPECIMEN
Discharge: HOME | End: 2022-12-07

## 2022-12-07 DIAGNOSIS — I10 ESSENTIAL HYPERTENSION: ICD-10-CM

## 2022-12-07 DIAGNOSIS — R79.89 LOW SERUM TESTOSTERONE: ICD-10-CM

## 2022-12-07 DIAGNOSIS — E78.00 PURE HYPERCHOLESTEROLEMIA: ICD-10-CM

## 2022-12-07 LAB
ALBUMIN SERPL-MCNC: 4.4 G/DL (ref 3.5–5.2)
ALBUMIN/GLOBULIN RATIO: 1.5 (ref 1–2.5)
ALP BLD-CCNC: 112 U/L (ref 40–129)
ALT SERPL-CCNC: 23 U/L (ref 5–41)
ANION GAP SERPL CALCULATED.3IONS-SCNC: 13 MMOL/L (ref 9–17)
AST SERPL-CCNC: 25 U/L
BILIRUB SERPL-MCNC: 0.4 MG/DL (ref 0.3–1.2)
BUN BLDV-MCNC: 10 MG/DL (ref 6–20)
CALCIUM SERPL-MCNC: 9.5 MG/DL (ref 8.6–10.4)
CHLORIDE BLD-SCNC: 96 MMOL/L (ref 98–107)
CHOLESTEROL/HDL RATIO: 2.3
CHOLESTEROL: 176 MG/DL
CO2: 27 MMOL/L (ref 20–31)
CREAT SERPL-MCNC: 0.76 MG/DL (ref 0.7–1.2)
GFR SERPL CREATININE-BSD FRML MDRD: >60 ML/MIN/1.73M2
GLUCOSE FASTING: 95 MG/DL (ref 70–99)
HDLC SERPL-MCNC: 77 MG/DL
LDL CHOLESTEROL: 58 MG/DL (ref 0–130)
POTASSIUM SERPL-SCNC: 4.7 MMOL/L (ref 3.7–5.3)
SODIUM BLD-SCNC: 136 MMOL/L (ref 135–144)
TESTOSTERONE TOTAL: 191 NG/DL (ref 220–1000)
TOTAL PROTEIN: 7.4 G/DL (ref 6.4–8.3)
TRIGL SERPL-MCNC: 203 MG/DL

## 2022-12-13 DIAGNOSIS — E78.00 PURE HYPERCHOLESTEROLEMIA: ICD-10-CM

## 2022-12-13 DIAGNOSIS — I10 ESSENTIAL HYPERTENSION: ICD-10-CM

## 2022-12-13 NOTE — TELEPHONE ENCOUNTER
Request for Losartan and Lipitor.     Last script sent: 9/19/22  Last OV: 11/29/22  Next Visit Date:  Future Appointments   Date Time Provider Donovan Peraza   1/11/2023  2:00 PM CARLOS Rashid - CNP [facility] [address]   Topic Date Due    Diabetic foot exam  Never done    Diabetic microalbuminuria test  Never done    Diabetic retinal exam  Never done    Annual Wellness Visit (AWV)  06/24/2021    COVID-19 Vaccine (4 - Booster for Moderna series) 02/17/2022    Colorectal Cancer Screen  10/11/2022    Hepatitis B vaccine (1 of 3 - Risk 3-dose series) 11/29/2023 (Originally [birth year]/1982)    Shingles vaccine (1 of 2) 11/29/2023 (Originally 11/6/2013)    A1C test (Diabetic or Prediabetic)  11/10/2023    Depression Monitoring  11/29/2023    Lipids  12/07/2023    DTaP/Tdap/Td vaccine (2 - Td or Tdap) 07/12/2026    Flu vaccine  Completed    Pneumococcal 0-64 years Vaccine  Completed    Hepatitis C screen  Completed    HIV screen  Completed    Hepatitis A vaccine  Aged Out    Hib vaccine  Aged Out    Meningococcal (ACWY) vaccine  Aged Out       Hemoglobin A1C (%)   Date Value   11/10/2022 6.0   10/27/2021 5.6   11/16/2020 6.0             ( goal A1C is < 7)   No results found for: LABMICR  LDL Cholesterol (mg/dL)   Date Value   12/07/2022 58       (goal LDL is <100)   AST (U/L)   Date Value   12/07/2022 25     ALT (U/L)   Date Value   12/07/2022 23     BUN (mg/dL)   Date Value   12/07/2022 10     BP Readings from Last 3 Encounters:   11/29/22 130/86   10/12/22 (!) 158/96   10/27/21 122/78          (goal 120/80)    All Future Testing planned in [facility]  Lab Frequency Next Occurrence         Patient Active Problem List:     Hyperlipidemia     Essential hypertension     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 9)     Chronic pain disorder     Primary osteoarthritis     Prediabetes     History of hip replacement, total, bilateral     Hx of fusion of cervical spine     Seasonal allergic rhinitis

## 2022-12-14 RX ORDER — LOSARTAN POTASSIUM 100 MG/1
100 TABLET ORAL DAILY
Qty: 90 TABLET | Refills: 1 | Status: SHIPPED | OUTPATIENT
Start: 2022-12-14

## 2022-12-14 RX ORDER — ATORVASTATIN CALCIUM 80 MG/1
TABLET, FILM COATED ORAL
Qty: 90 TABLET | Refills: 1 | Status: SHIPPED | OUTPATIENT
Start: 2022-12-14

## 2022-12-26 DIAGNOSIS — I10 ESSENTIAL HYPERTENSION: ICD-10-CM

## 2022-12-26 DIAGNOSIS — R22.43 LOCALIZED SWELLING OF BOTH LOWER LEGS: ICD-10-CM

## 2022-12-26 DIAGNOSIS — R73.01 IMPAIRED FASTING BLOOD SUGAR: ICD-10-CM

## 2022-12-27 RX ORDER — DILTIAZEM HYDROCHLORIDE 180 MG/1
180 CAPSULE, COATED, EXTENDED RELEASE ORAL DAILY
Qty: 30 CAPSULE | Refills: 1 | Status: SHIPPED | OUTPATIENT
Start: 2022-12-27 | End: 2023-01-26

## 2022-12-27 RX ORDER — HYDROCHLOROTHIAZIDE 25 MG/1
25 TABLET ORAL EVERY MORNING
Qty: 30 TABLET | Refills: 1 | Status: SHIPPED | OUTPATIENT
Start: 2022-12-27 | End: 2023-01-26

## 2022-12-27 NOTE — TELEPHONE ENCOUNTER
Last visit: 11/29/22  Last Med refill: 11/29/2022   Metformin 9/27/2022  Does patient have enough medication for 72 hours: Yes    Next Visit Date:  Future Appointments   Date Time Provider Donovan Peraza   1/11/2023  2:00 PM CARLOS Fitzgerald CNP [facility] [address] Maintenance   Topic Date Due    Diabetic foot exam  Never done    Diabetic Alb to Cr ratio (uACR) test  Never done    Diabetic retinal exam  Never done    Annual Wellness Visit (AWV)  06/24/2021    COVID-19 Vaccine (4 - Booster for Gearldine Leroy series) 02/17/2022    Colorectal Cancer Screen  10/11/2022    Hepatitis B vaccine (1 of 3 - Risk 3-dose series) 11/29/2023 (Originally [birth year]1982)    Shingles vaccine (1 of 2) 11/29/2023 (Originally 11/6/2013)    A1C test (Diabetic or Prediabetic)  11/10/2023    Depression Monitoring  11/29/2023    Lipids  12/07/2023    GFR test (Diabetes, CKD 3-4, OR last GFR 15-59)  12/07/2023    DTaP/Tdap/Td vaccine (2 - Td or Tdap) 07/12/2026    Flu vaccine  Completed    Pneumococcal 0-64 years Vaccine  Completed    Hepatitis C screen  Completed    HIV screen  Completed    Hepatitis A vaccine  Aged Out    Hib vaccine  Aged Out    Meningococcal (ACWY) vaccine  Aged Out       Hemoglobin A1C (%)   Date Value   11/10/2022 6.0   10/27/2021 5.6   11/16/2020 6.0             ( goal A1C is < 7)   No results found for: LABMICR  LDL Cholesterol (mg/dL)   Date Value   12/07/2022 58   11/16/2020 68       (goal LDL is <100)   AST (U/L)   Date Value   12/07/2022 25     ALT (U/L)   Date Value   12/07/2022 23     BUN (mg/dL)   Date Value   12/07/2022 10     BP Readings from Last 3 Encounters:   11/29/22 130/86   10/12/22 (!) 158/96   10/27/21 122/78          (goal 120/80)    All Future Testing planned in [facility]  Lab Frequency Next Occurrence               Patient Active Problem List:     Hyperlipidemia     Essential hypertension     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 9)     Chronic pain disorder     Primary osteoarthritis     Prediabetes     History of hip replacement, total, bilateral     Hx of fusion of cervical spine     Seasonal allergic rhinitis

## 2022-12-29 DIAGNOSIS — K21.9 GASTROESOPHAGEAL REFLUX DISEASE WITHOUT ESOPHAGITIS: ICD-10-CM

## 2022-12-29 NOTE — TELEPHONE ENCOUNTER
Last visit: 11/29/2022  Last Med refill: 10/03/2022  Does patient have enough medication for 72 hours: Yes    Next Visit Date:  Future Appointments   Date Time Provider Donovan Peraza   1/11/2023  2:00 PM CARLOS Stauffer - CNP [facility] [address] Maintenance   Topic Date Due    Diabetic foot exam  Never done    Diabetic Alb to Cr ratio (uACR) test  Never done    Diabetic retinal exam  Never done    Annual Wellness Visit (AWV)  06/24/2021    COVID-19 Vaccine (4 - Booster for Wallie Salon series) 02/17/2022    Colorectal Cancer Screen  10/11/2022    Hepatitis B vaccine (1 of 3 - Risk 3-dose series) 11/29/2023 (Originally 11/[birth year]1982)    Shingles vaccine (1 of 2) 11/29/2023 (Originally 11/6/2013)    A1C test (Diabetic or Prediabetic)  11/10/2023    Depression Monitoring  11/29/2023    Lipids  12/07/2023    GFR test (Diabetes, CKD 3-4, OR last GFR 15-59)  12/07/2023    DTaP/Tdap/Td vaccine (2 - Td or Tdap) 07/12/2026    Flu vaccine  Completed    Pneumococcal 0-64 years Vaccine  Completed    Hepatitis C screen  Completed    HIV screen  Completed    Hepatitis A vaccine  Aged Out    Hib vaccine  Aged Out    Meningococcal (ACWY) vaccine  Aged Out       Hemoglobin A1C (%)   Date Value   11/10/2022 6.0   10/27/2021 5.6   11/16/2020 6.0             ( goal A1C is < 7)   No results found for: LABMICR  LDL Cholesterol (mg/dL)   Date Value   12/07/2022 58   11/16/2020 68       (goal LDL is <100)   AST (U/L)   Date Value   12/07/2022 25     ALT (U/L)   Date Value   12/07/2022 23     BUN (mg/dL)   Date Value   12/07/2022 10     BP Readings from Last 3 Encounters:   11/29/22 130/86   10/12/22 (!) 158/96   10/27/21 122/78          (goal 120/80)    All Future Testing planned in [facility]  Lab Frequency Next Occurrence               Patient Active Problem List:     Hyperlipidemia     Essential hypertension     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 9)     Chronic pain disorder     Primary osteoarthritis     Prediabetes     History of hip replacement, total, bilateral     Hx of fusion of cervical spine     Seasonal allergic rhinitis

## 2023-01-09 ENCOUNTER — HOSPITAL ENCOUNTER (OUTPATIENT)
Age: 58
Setting detail: SPECIMEN
Discharge: HOME | End: 2023-01-09

## 2023-01-09 LAB
ANION GAP SERPL CALCULATED.3IONS-SCNC: 14 MMOL/L (ref 9–17)
BUN BLDV-MCNC: 11 MG/DL (ref 6–20)
CALCIUM SERPL-MCNC: 9.7 MG/DL (ref 8.6–10.4)
CHLORIDE BLD-SCNC: 88 MMOL/L (ref 98–107)
CO2: 26 MMOL/L (ref 20–31)
CREAT SERPL-MCNC: 0.73 MG/DL (ref 0.7–1.2)
GFR SERPL CREATININE-BSD FRML MDRD: >60 ML/MIN/1.73M2
GLUCOSE BLD-MCNC: 95 MG/DL (ref 70–99)
POTASSIUM SERPL-SCNC: 4.5 MMOL/L (ref 3.7–5.3)
SODIUM BLD-SCNC: 128 MMOL/L (ref 135–144)

## 2023-01-11 ENCOUNTER — OFFICE VISIT (OUTPATIENT)
Dept: FAMILY MEDICINE CLINIC | Age: 58
End: 2023-01-11
Payer: MEDICARE

## 2023-01-11 VITALS
SYSTOLIC BLOOD PRESSURE: 150 MMHG | DIASTOLIC BLOOD PRESSURE: 84 MMHG | TEMPERATURE: 98.6 F | WEIGHT: 277 LBS | BODY MASS INDEX: 34.44 KG/M2 | OXYGEN SATURATION: 96 % | HEART RATE: 90 BPM | HEIGHT: 75 IN

## 2023-01-11 DIAGNOSIS — G47.30 SLEEP APNEA, UNSPECIFIED TYPE: ICD-10-CM

## 2023-01-11 DIAGNOSIS — F33.0 MAJOR DEPRESSIVE DISORDER, RECURRENT EPISODE, MILD (HCC): ICD-10-CM

## 2023-01-11 DIAGNOSIS — I10 ESSENTIAL HYPERTENSION: ICD-10-CM

## 2023-01-11 DIAGNOSIS — Z00.00 MEDICARE ANNUAL WELLNESS VISIT, SUBSEQUENT: Primary | ICD-10-CM

## 2023-01-11 LAB
CREATININE URINE POCT: 200
MICROALBUMIN/CREAT 24H UR: 80 MG/G{CREAT}
MICROALBUMIN/CREAT UR-RTO: 30

## 2023-01-11 PROCEDURE — 3074F SYST BP LT 130 MM HG: CPT | Performed by: NURSE PRACTITIONER

## 2023-01-11 PROCEDURE — 3078F DIAST BP <80 MM HG: CPT | Performed by: NURSE PRACTITIONER

## 2023-01-11 PROCEDURE — G8427 DOCREV CUR MEDS BY ELIG CLIN: HCPCS | Performed by: NURSE PRACTITIONER

## 2023-01-11 PROCEDURE — 99214 OFFICE O/P EST MOD 30 MIN: CPT | Performed by: NURSE PRACTITIONER

## 2023-01-11 PROCEDURE — 3017F COLORECTAL CA SCREEN DOC REV: CPT | Performed by: NURSE PRACTITIONER

## 2023-01-11 PROCEDURE — G8417 CALC BMI ABV UP PARAM F/U: HCPCS | Performed by: NURSE PRACTITIONER

## 2023-01-11 PROCEDURE — 82044 UR ALBUMIN SEMIQUANTITATIVE: CPT | Performed by: NURSE PRACTITIONER

## 2023-01-11 PROCEDURE — 1036F TOBACCO NON-USER: CPT | Performed by: NURSE PRACTITIONER

## 2023-01-11 PROCEDURE — G0439 PPPS, SUBSEQ VISIT: HCPCS | Performed by: NURSE PRACTITIONER

## 2023-01-11 PROCEDURE — G8482 FLU IMMUNIZE ORDER/ADMIN: HCPCS | Performed by: NURSE PRACTITIONER

## 2023-01-11 RX ORDER — DILTIAZEM HYDROCHLORIDE 360 MG/1
360 CAPSULE, EXTENDED RELEASE ORAL DAILY
Qty: 30 CAPSULE | Refills: 1 | Status: SHIPPED | OUTPATIENT
Start: 2023-01-11 | End: 2023-02-10

## 2023-01-11 ASSESSMENT — SLEEP AND FATIGUE QUESTIONNAIRES
HOW LIKELY ARE YOU TO NOD OFF OR FALL ASLEEP WHILE LYING DOWN TO REST IN THE AFTERNOON WHEN CIRCUMSTANCES PERMIT: 1
DO YOU HAVE CONGESTIVE HEART FAILURE (CHF), HYPERTENSION, CORONARY DISEASE, CARDIAC ARRYTHMIAS, DIABETES, OR HAD A STROKE: 2
HAVE YOU BEEN TOLD YOU SNORE, EVEN INTERMITTENTLY: 1
DO YOU WAKE UP MORE THAN ONCE A NIGHT TO URINATE: 0
HOW LIKELY ARE YOU TO NOD OFF OR FALL ASLEEP WHILE SITTING AND TALKING TO SOMEONE: 0
ESS TOTAL SCORE: 9
DO YOU WAKE UP TIRED, ARE YOU TIRED DURING THE DAY, OR DO YOU TAKE NAPS: 1
HOW LIKELY ARE YOU TO NOD OFF OR FALL ASLEEP WHILE SITTING QUIETLY AFTER LUNCH WITHOUT ALCOHOL: 3
SLEEP APNEA SCREENER SCORE: 7
NECK CIRCUMFERENCE (INCHES): 18
HOW LIKELY ARE YOU TO NOD OFF OR FALL ASLEEP WHILE WATCHING TV: 1
HOW LIKELY ARE YOU TO NOD OFF OR FALL ASLEEP IN A CAR, WHILE STOPPED FOR A FEW MINUTES IN TRAFFIC: 0
HAVE YOU EVER BEEN TOLD YOU STOP BREATHING OR HOLD YOUR BREATH WHILE SLEEPING: 3
HOW LIKELY ARE YOU TO NOD OFF OR FALL ASLEEP WHEN YOU ARE A PASSENGER IN A CAR FOR AN HOUR WITHOUT A BREAK: 1
HOW LIKELY ARE YOU TO NOD OFF OR FALL ASLEEP WHILE SITTING INACTIVE IN A PUBLIC PLACE: 2
HOW LIKELY ARE YOU TO NOD OFF OR FALL ASLEEP WHILE SITTING AND READING: 1

## 2023-01-11 ASSESSMENT — ANXIETY QUESTIONNAIRES
5. BEING SO RESTLESS THAT IT IS HARD TO SIT STILL: 0
IF YOU CHECKED OFF ANY PROBLEMS ON THIS QUESTIONNAIRE, HOW DIFFICULT HAVE THESE PROBLEMS MADE IT FOR YOU TO DO YOUR WORK, TAKE CARE OF THINGS AT HOME, OR GET ALONG WITH OTHER PEOPLE: NOT DIFFICULT AT ALL
4. TROUBLE RELAXING: 1
3. WORRYING TOO MUCH ABOUT DIFFERENT THINGS: 1
7. FEELING AFRAID AS IF SOMETHING AWFUL MIGHT HAPPEN: 0
2. NOT BEING ABLE TO STOP OR CONTROL WORRYING: 0
GAD7 TOTAL SCORE: 4
1. FEELING NERVOUS, ANXIOUS, OR ON EDGE: 1
6. BECOMING EASILY ANNOYED OR IRRITABLE: 1

## 2023-01-11 ASSESSMENT — PATIENT HEALTH QUESTIONNAIRE - PHQ9
SUM OF ALL RESPONSES TO PHQ QUESTIONS 1-9: 7
7. TROUBLE CONCENTRATING ON THINGS, SUCH AS READING THE NEWSPAPER OR WATCHING TELEVISION: 1
8. MOVING OR SPEAKING SO SLOWLY THAT OTHER PEOPLE COULD HAVE NOTICED. OR THE OPPOSITE, BEING SO FIGETY OR RESTLESS THAT YOU HAVE BEEN MOVING AROUND A LOT MORE THAN USUAL: 0
SUM OF ALL RESPONSES TO PHQ QUESTIONS 1-9: 7
10. IF YOU CHECKED OFF ANY PROBLEMS, HOW DIFFICULT HAVE THESE PROBLEMS MADE IT FOR YOU TO DO YOUR WORK, TAKE CARE OF THINGS AT HOME, OR GET ALONG WITH OTHER PEOPLE: 0
5. POOR APPETITE OR OVEREATING: 0
3. TROUBLE FALLING OR STAYING ASLEEP: 1
9. THOUGHTS THAT YOU WOULD BE BETTER OFF DEAD, OR OF HURTING YOURSELF: 0
SUM OF ALL RESPONSES TO PHQ9 QUESTIONS 1 & 2: 2
2. FEELING DOWN, DEPRESSED OR HOPELESS: 1
1. LITTLE INTEREST OR PLEASURE IN DOING THINGS: 1
SUM OF ALL RESPONSES TO PHQ QUESTIONS 1-9: 7
4. FEELING TIRED OR HAVING LITTLE ENERGY: 2
SUM OF ALL RESPONSES TO PHQ QUESTIONS 1-9: 7
6. FEELING BAD ABOUT YOURSELF - OR THAT YOU ARE A FAILURE OR HAVE LET YOURSELF OR YOUR FAMILY DOWN: 1

## 2023-01-11 ASSESSMENT — LIFESTYLE VARIABLES
HOW OFTEN DO YOU HAVE A DRINK CONTAINING ALCOHOL: 2-3 TIMES A WEEK
HOW MANY STANDARD DRINKS CONTAINING ALCOHOL DO YOU HAVE ON A TYPICAL DAY: 1 OR 2

## 2023-01-11 NOTE — PATIENT INSTRUCTIONS
Preventing Falls: Care Instructions  Overview     Getting around your home safely can be a challenge if you have injuries or health problems that make it easy for you to fall. Loose rugs and furniture in walkways are among the dangers for many older people who have problems walking or who have poor eyesight. People who have conditions such as arthritis, osteoporosis, or dementia also have to be careful not to fall. You can make your home safer with a few simple measures. Follow-up care is a key part of your treatment and safety. Be sure to make and go to all appointments, and call your doctor if you are having problems. It's also a good idea to know your test results and keep a list of the medicines you take. How can you care for yourself at home? Taking care of yourself  Exercise regularly to improve your strength, muscle tone, and balance. Walk if you can. Swimming may be a good choice if you cannot walk easily. Have your vision and hearing checked each year or any time you notice a change. If you have trouble seeing and hearing, you might not be able to avoid objects and could lose your balance. Know the side effects of the medicines you take. Ask your doctor or pharmacist whether the medicines you take can affect your balance. Sleeping pills or sedatives can affect your balance. Limit the amount of alcohol you drink. Alcohol can impair your balance and other senses. Ask your doctor whether calluses or corns on your feet need to be removed. If you wear loose-fitting shoes because of calluses or corns, you can lose your balance and fall. Talk to your doctor if you have numbness in your feet. You may get dizzy if you do not drink enough water. To prevent dehydration, drink plenty of fluids. Choose water and other clear liquids. If you have kidney, heart, or liver disease and have to limit fluids, talk with your doctor before you increase the amount of fluids you drink.   Preventing falls at home  Remove raised doorway thresholds, throw rugs, and clutter. Repair loose carpet or raised areas in the floor. Move furniture and electrical cords to keep them out of walking paths. Use nonskid floor wax, and wipe up spills right away, especially on ceramic tile floors. If you use a walker or cane, put rubber tips on it. If you use crutches, clean the bottoms of them regularly with an abrasive pad, such as steel wool. Keep your house well lit, especially stairways, porches, and outside walkways. Use night-lights in areas such as hallways and bathrooms. Add extra light switches or use remote switches (such as switches that go on or off when you clap your hands) to make it easier to turn lights on if you have to get up during the night. Install sturdy handrails on stairways. Move items in your cabinets so that the things you use a lot are on the lower shelves (about waist level). Keep a cordless phone and a flashlight with new batteries by your bed. If possible, put a phone in each of the main rooms of your house, or carry a cell phone in case you fall and cannot reach a phone. Or, you can wear a device around your neck or wrist. You push a button that sends a signal for help. Wear low-heeled shoes that fit well and give your feet good support. Use footwear with nonskid soles. Check the heels and soles of your shoes for wear. Repair or replace worn heels or soles. Do not wear socks without shoes on smooth floors, such as wood. Walk on the grass when the sidewalks are slippery. If you live in an area that gets snow and ice in the winter, sprinkle salt on slippery steps and sidewalks. Or ask a family member or friend to do this for you. Preventing falls in the bath  Install grab bars and nonskid mats inside and outside your shower or tub and near the toilet and sinks. Use shower chairs and bath benches. Use a hand-held shower head that will allow you to sit while showering.   Get into a tub or shower by putting the weaker leg in first. Get out of a tub or shower with your strong side first.  Repair loose toilet seats and consider installing a raised toilet seat to make getting on and off the toilet easier. Keep your bathroom door unlocked while you are in the shower. Where can you learn more? Go to http://www.toussaint.com/ and enter G117 to learn more about \"Preventing Falls: Care Instructions. \"  Current as of: May 4, 2022               Content Version: 13.5  © 7179-1569 Healthwise, [facility] [place]. Care instructions adapted under license by [facility] ([facility]). If you have questions about a medical condition or this instruction, always ask your healthcare professional. [address] any warranty or liability for your use of this information. Learning About Mindfulness for Stress  What are mindfulness and stress? Stress is what you feel when you have to handle more than you are used to. A lot of things can cause stress. You may feel stress when you go on a job interview, take a test, or run a race. This kind of short-term stress is normal and even useful. It can help you if you need to work hard or react quickly. Stress also can last a long time. Long-term stress is caused by stressful situations or events. Examples of long-term stress include long-term health problems, ongoing problems at work, and conflicts in your family. Long-term stress can harm your health. Mindfulness is a focus only on things happening in the present moment. It's a process of purposefully paying attention to and being aware of your surroundings, your emotions, your thoughts, and how your body feels. You are aware of these things, but you aren't judging these experiences as \"good\" or \"bad. \" Mindfulness can help you learn to calm your mind and body to help you cope with illness, pain, and stress. How does mindfulness help to relieve stress? Mindfulness can help quiet your mind and relax your body. Studies show that it can help some people sleep better, feel less anxious, and bring their blood pressure down. And it's been shown to help some people live and cope better with certain health problems like heart disease, depression, chronic pain, and cancer. How do you practice mindfulness? To be mindful is to pay attention, to be present, and to be accepting. When you're mindful, you do just one thing and you pay close attention to that one thing. For example, you may sit quietly and notice your emotions or how your food tastes and smells. When you're present, you focus on the things that are happening right now. You let go of your thoughts about the past and the future. When you dwell on the past or the future, you miss moments that can heal and strengthen you. You may miss moments like hearing a child laugh or seeing a friendly face when you think you're all alone. When you're accepting, you don't  the present moment. Instead you accept your thoughts and feelings as they come. You can practice anytime, anywhere, and in any way you choose. You can practice in many ways. Here are a few ideas:  While doing your chores, like washing the dishes, let your mind focus on what's in your hand. What does the dish feel like? Is the water warm or cold? Go outside and take a few deep breaths. What is the air like? Is it warm or cold? When you can, take some time at the start of your day to sit alone and think. Take a slow walk by yourself. Count your steps while you breathe in and out. Try yoga breathing exercises, stretches, and poses to strengthen and relax your muscles. At work, if you can, try to stop for a few moments each hour. Note how your body feels. Let yourself regroup and let your mind settle before you return to what you were doing. If you struggle with anxiety or \"worry thoughts,\" imagine your mind as a blue leander and your worry thoughts as clouds.  Now imagine those worry thoughts floating across your mind's leander. Just let them pass by as you watch. Follow-up care is a key part of your treatment and safety. Be sure to make and go to all appointments, and call your doctor if you are having problems. It's also a good idea to know your test results and keep a list of the medicines you take. Where can you learn more? Go to http://www.toussaint.com/ and enter M676 to learn more about \"Learning About Mindfulness for Stress. \"  Current as of: February 9, 2022               Content Version: 13.5  © 7060-4808 "[employer]. Care instructions adapted under license by [facility] ([facility]). If you have questions about a medical condition or this instruction, always ask your healthcare professional. [address] any warranty or liability for your use of this information. Learning About Stress  What is stress? Stress is what you feel when you have to handle more than you are used to. Stress is a fact of life for most people, and it affects everyone differently. What causes stress for you may not be stressful for someone else. A lot of things can cause stress. You may feel stress when you go on a job interview, take a test, or run a race. This kind of short-term stress is normal and even useful. It can help you if you need to work hard or react quickly. For example, stress can help you finish an important job on time. Stress also can last a long time. Long-term stress is caused by stressful situations or events. Examples of long-term stress include long-term health problems, ongoing problems at work, or conflicts in your family. Long-term stress can harm your health. How does stress affect your health? When you are stressed, your body responds as though you are in danger. It makes hormones that speed up your heart, make you breathe faster, and give you a burst of energy. This is called the fight-or-flight stress response.  If the stress is over quickly, your body goes back to normal and no harm is done. But if stress happens too often or lasts too long, it can have bad effects. Long-term stress can make you more likely to get sick, and it can make symptoms of some diseases worse. If you tense up when you are stressed, you may develop neck, shoulder, or low back pain. Stress is linked to high blood pressure and heart disease. Stress also harms your emotional health. It can make you coburn, tense, or depressed. Your relationships may suffer, and you may not do well at work or school. What can you do to manage stress? How to relax your mind   Write. It may help to write about things that are bothering you. This helps you find out how much stress you feel and what is causing it. When you know this, you can find better ways to cope. Let your feelings out. Talk, laugh, cry, and express anger when you need to. Talking with friends, family, a counselor, or a member of the clergy about your feelings is a healthy way to relieve stress. Do something you enjoy. For example, listen to music or go to a movie. Practice your hobby or do volunteer work. Meditate. This can help you relax, because you are not worrying about what happened before or what may happen in the future. Do guided imagery. Imagine yourself in any setting that helps you feel calm. You can use audiotapes, books, or a teacher to guide you. How to relax your body   Do something active. Exercise or activity can help reduce stress. Walking is a great way to get started. Even everyday activities such as housecleaning or yard work can help. Do breathing exercises. For example:  From a standing position, bend forward from the waist with your knees slightly bent. Let your arms dangle close to the floor. Breathe in slowly and deeply as you return to a standing position. Roll up slowly and lift your head last.  Hold your breath for just a few seconds in the standing position.   Breathe out slowly and bend forward from the waist.  Try yoga or eleazar chi. These techniques combine exercise and meditation. You may need some training at first to learn them. What can you do to prevent stress? Manage your time. This helps you find time to do the things you want and need to do. Get enough sleep. Your body recovers from the stresses of the day while you are sleeping. Get support. Your family, friends, and community can make a difference in how you experience stress. Where can you learn more? Go to http://www.toussaint.com/ and enter N032 to learn more about \"Learning About Stress. \"  Current as of: October 6, 2021               Content Version: 13.5  © 7578-9862 [employer]. Care instructions adapted under license by [facility] ([facility]). If you have questions about a medical condition or this instruction, always ask your healthcare professional. [address] any warranty or liability for your use of this information. Hearing Loss: Care Instructions  Overview     Hearing loss is a sudden or slow decrease in how well you hear. It can range from mild to severe. Permanent hearing loss can occur with aging. It also can happen when you are exposed long-term to loud noise. Examples include listening to loud music, riding motorcycles, or being around other loud machines. Hearing loss can affect your work and home life. It can make you feel lonely or depressed. You may feel that you have lost your independence. But hearing aids and other devices can help you hear better and feel connected to others. Follow-up care is a key part of your treatment and safety. Be sure to make and go to all appointments, and call your doctor if you are having problems. It's also a good idea to know your test results and keep a list of the medicines you take. How can you care for yourself at home? Avoid loud noises whenever possible. This helps keep your hearing from getting worse.   Always wear hearing protection around loud noises. Wear a hearing aid as directed. See a professional who can help you pick a hearing aid that fits you. Have hearing tests as your doctor suggests. They can show whether your hearing has changed. Your hearing aid may need to be adjusted. Use other devices as needed. These may include:  Telephone amplifiers and hearing aids that can connect to a television, stereo, radio, or microphone. Devices that use lights or vibrations. These alert you to the doorbell, a ringing telephone, or a baby monitor. Television closed-captioning. This shows the words at the bottom of the screen. Most new TVs can do this. TTY (text telephone). This lets you type messages back and forth on the telephone instead of talking or listening. These devices are also called TDD. When messages are typed on the keyboard, they are sent over the phone line to a receiving TTY. The message is shown on a monitor. Use text messaging, social media, and email if it is hard for you to communicate by telephone. Try to learn a listening technique called speechreading. It is not lipreading. You pay attention to people's gestures, expressions, posture, and tone of voice. These clues can help you understand what a person is saying. Face the person you are talking to, and have them face you. Make sure the lighting is good. You need to see the other person's face clearly. Think about counseling if you need help to adjust to your hearing loss. When should you call for help? Watch closely for changes in your health, and be sure to contact your doctor if:    You think your hearing is getting worse.     You have new symptoms, such as dizziness or nausea. Where can you learn more? Go to http://www.toussaint.com/ and enter R798 to learn more about \"Hearing Loss: Care Instructions. \"  Current as of: May 4, 2022               Content Version: 13.5  © 0222-7515 Healthwise, [employer].    Care instructions adapted under license by [facility] ([facility]). If you have questions about a medical condition or this instruction, always ask your healthcare professional. [address] any warranty or liability for your use of this information. Learning About Vision Tests  What are vision tests? The four most common vision tests are visual acuity tests, refraction, visual field tests, and color vision tests. Visual acuity (sharpness) tests  These tests are used: To see if you need glasses or contact lenses. To monitor an eye problem. To check an eye injury. Visual acuity tests are done as part of routine exams. You may also have this test when you get your 's license or apply for some types of jobs. Visual field tests  These tests are used: To check for vision loss in any area of your range of vision. To screen for certain eye diseases. To look for nerve damage after a stroke, head injury, or other problem that could reduce blood flow to the brain. Refraction and color tests  A refraction test is done to find the right prescription for glasses and contact lenses. A color vision test is done to check for color blindness. Color vision is often tested as part of a routine exam. You may also have this test when you apply for a job where recognizing different colors is important, such as , electronics, or the [employer]. How are vision tests done? Visual acuity test   You cover one eye at a time. You read aloud from a wall chart across the room. You read aloud from a small card that you hold in your hand. Refraction   You look into a special device. The device puts lenses of different strengths in front of each eye to see how strong your glasses or contact lenses need to be. Visual field tests   Your doctor may have you look through special machines. Or your doctor may simply have you stare straight ahead while they move a finger into and out of your field of vision.   Color vision test   You look at pieces of printed test patterns in various colors. You say what number or symbol you see. Your doctor may have you trace the number or symbol using a pointer. How do these tests feel? There is very little chance of having a problem from this test. If dilating drops are used for a vision test, they may make the eyes sting and cause a medicine taste in the mouth. Follow-up care is a key part of your treatment and safety. Be sure to make and go to all appointments, and call your doctor if you are having problems. It's also a good idea to know your test results and keep a list of the medicines you take. Where can you learn more? Go to http://www.toussaint.com/ and enter G551 to learn more about \"Learning About Vision Tests. \"  Current as of: October 12, 2022               Content Version: 13.5  © 8089-5104 Healthwise, [employer]. Care instructions adapted under license by [facility] ([facility]). If you have questions about a medical condition or this instruction, always ask your healthcare professional. [place] [address] any warranty or liability for your use of this information. Preventing Falls: Care Instructions  Overview     Getting around your home safely can be a challenge if you have injuries or health problems that make it easy for you to fall. Loose rugs and furniture in walkways are among the dangers for many older people who have problems walking or who have poor eyesight. People who have conditions such as arthritis, osteoporosis, or dementia also have to be careful not to fall. You can make your home safer with a few simple measures. Follow-up care is a key part of your treatment and safety. Be sure to make and go to all appointments, and call your doctor if you are having problems. It's also a good idea to know your test results and keep a list of the medicines you take. How can you care for yourself at home?   Taking care of yourself  Exercise regularly to improve your strength, muscle tone, and balance. Walk if you can. Swimming may be a good choice if you cannot walk easily. Have your vision and hearing checked each year or any time you notice a change. If you have trouble seeing and hearing, you might not be able to avoid objects and could lose your balance. Know the side effects of the medicines you take. Ask your doctor or pharmacist whether the medicines you take can affect your balance. Sleeping pills or sedatives can affect your balance. Limit the amount of alcohol you drink. Alcohol can impair your balance and other senses. Ask your doctor whether calluses or corns on your feet need to be removed. If you wear loose-fitting shoes because of calluses or corns, you can lose your balance and fall. Talk to your doctor if you have numbness in your feet. You may get dizzy if you do not drink enough water. To prevent dehydration, drink plenty of fluids. Choose water and other clear liquids. If you have kidney, heart, or liver disease and have to limit fluids, talk with your doctor before you increase the amount of fluids you drink. Preventing falls at home  Remove raised doorway thresholds, throw rugs, and clutter. Repair loose carpet or raised areas in the floor. Move furniture and electrical cords to keep them out of walking paths. Use nonskid floor wax, and wipe up spills right away, especially on ceramic tile floors. If you use a walker or cane, put rubber tips on it. If you use crutches, clean the bottoms of them regularly with an abrasive pad, such as steel wool. Keep your house well lit, especially stairways, porches, and outside walkways. Use night-lights in areas such as hallways and bathrooms. Add extra light switches or use remote switches (such as switches that go on or off when you clap your hands) to make it easier to turn lights on if you have to get up during the night. Install sturdy handrails on stairways.   Move items in your cabinets so that the things you use a lot are on the lower shelves (about waist level). Keep a cordless phone and a flashlight with new batteries by your bed. If possible, put a phone in each of the main rooms of your house, or carry a cell phone in case you fall and cannot reach a phone. Or, you can wear a device around your neck or wrist. You push a button that sends a signal for help. Wear low-heeled shoes that fit well and give your feet good support. Use footwear with nonskid soles. Check the heels and soles of your shoes for wear. Repair or replace worn heels or soles. Do not wear socks without shoes on smooth floors, such as wood. Walk on the grass when the sidewalks are slippery. If you live in an area that gets snow and ice in the winter, sprinkle salt on slippery steps and sidewalks. Or ask a family member or friend to do this for you. Preventing falls in the bath  Install grab bars and nonskid mats inside and outside your shower or tub and near the toilet and sinks. Use shower chairs and bath benches. Use a hand-held shower head that will allow you to sit while showering. Get into a tub or shower by putting the weaker leg in first. Get out of a tub or shower with your strong side first.  Repair loose toilet seats and consider installing a raised toilet seat to make getting on and off the toilet easier. Keep your bathroom door unlocked while you are in the shower. Where can you learn more? Go to http://www.toussaint.com/ and enter G117 to learn more about \"Preventing Falls: Care Instructions. \"  Current as of: May 4, 2022               Content Version: 13.5  © 2544-9664 [employer]. Care instructions adapted under license by [facility] ([facility]). If you have questions about a medical condition or this instruction, always ask your healthcare professional. [address] any warranty or liability for your use of this information.            Learning About Activities of Daily Living  What are activities of daily living? Activities of daily living (ADLs) are the basic self-care tasks you do every day. As you age, and if you have health problems, you may find that it's harder to do these things for yourself. That's when you may need some help. Your doctor uses ADLs to measure how much help you need. Knowing what you can and can't do for yourself is an important first step to getting help. And when you have the help you need, you can stay as independent as possible. Your doctor will want to know if you are able to do tasks such as: Take a bath or shower without help. Go to the bathroom by yourself. Dress and undress without help. Shave, comb your hair, and brush teeth on your own. Get in and out of bed or a chair without help. Feed yourself without help. If you are having trouble doing basic self-care tasks, talk with your doctor. You may want to bring a caregiver or family member who can help the doctor understand your needs and abilities. How will a doctor assess your ADLs? Asking about ADLs is part of a routine health checkup your doctor will likely do as you age. Your health check might be done in a doctor's office, in your home, or at a hospital. The goal is to find out if you are having any problems that could make your health problems worse or that make it unsafe for you to be on your own. To measure your ADLs, your doctor will ask how hard it is for you to do routine tasks. He or she may also want to know if you have changed the way you do a task because of a health problem. He or she may watch how you:  Walk back and forth. Keep your balance while you stand or walk. Move from sitting to standing or from a bed to a chair. Button or unbutton a shirt or sweater. Remove and put on your shoes. It's normal to feel a little worried or anxious if you find you can't do all the things you used to be able to do.  Talking with your doctor about ADLs isn't a test that you either pass or fail. It's just a way to get more information about your health and safety. Follow-up care is a key part of your treatment and safety. Be sure to make and go to all appointments, and call your doctor if you are having problems. It's also a good idea to know your test results and keep a list of the medicines you take. Current as of: October 6, 2021               Content Version: 13.5  © 2006-2022 [employer]. Care instructions adapted under license by [facility] ([facility]). If you have questions about a medical condition or this instruction, always ask your healthcare professional. [place] [address] any warranty or liability for your use of this information. Advance Directives: Care Instructions  Overview  An advance directive is a legal way to state your wishes at the end of your life. It tells your family and your doctor what to do if you can't say what you want. There are two main types of advance directives. You can change them any time your wishes change. Living will. This form tells your family and your doctor your wishes about life support and other treatment. The form is also called a declaration. Medical power of . This form lets you name a person to make treatment decisions for you when you can't speak for yourself. This person is called a health care agent (health care proxy, health care surrogate). The form is also called a durable power of  for health care. If you do not have an advance directive, decisions about your medical care may be made by a family member, or by a doctor or a  who doesn't know you. It may help to think of an advance directive as a gift to the people who care for you. If you have one, they won't have to make tough decisions by themselves. For more information, including forms for your state, see the [address] website (www.caringinfo.org/planning/advance-directives/).   Follow-up care is a key part of your treatment and safety. Be sure to make and go to all appointments, and call your doctor if you are having problems. It's also a good idea to know your test results and keep a list of the medicines you take. What should you include in an advance directive? Many states have a unique advance directive form. (It may ask you to address specific issues.) Or you might use a universal form that's approved by many states. If your form doesn't tell you what to address, it may be hard to know what to include in your advance directive. Use the questions below to help you get started. Who do you want to make decisions about your medical care if you are not able to? What life-support measures do you want if you have a serious illness that gets worse over time or can't be cured? What are you most afraid of that might happen? (Maybe you're afraid of having pain, losing your independence, or being kept alive by machines.)  Where would you prefer to die? (Your home? A hospital? A nursing home?)  Do you want to donate your organs when you die? Do you want certain Denominational practices performed before you die? When should you call for help? Be sure to contact your doctor if you have any questions. Where can you learn more? Go to http://www.toussaint.com/ and enter R264 to learn more about \"Advance Directives: Care Instructions. \"  Current as of: June 16, 2022               Content Version: 13.5  © 2006-2022 [employer]. Care instructions adapted under license by [facility] ([facility]). If you have questions about a medical condition or this instruction, always ask your healthcare professional. [address] any warranty or liability for your use of this information. DASH Diet: Care Instructions  Your Care Instructions     The DASH diet is an eating plan that can help lower your blood pressure. DASH stands for Dietary Approaches to Stop Hypertension.  Hypertension is high blood pressure.  The DASH diet focuses on eating foods that are high in calcium, potassium, and magnesium. These nutrients can lower blood pressure. The foods that are highest in these nutrients are fruits, vegetables, low-fat dairy products, nuts, seeds, and legumes. But taking calcium, potassium, and magnesium supplements instead of eating foods that are high in those nutrients does not have the same effect. The DASH diet also includes whole grains, fish, and poultry.  The DASH diet is one of several lifestyle changes your doctor may recommend to lower your high blood pressure. Your doctor may also want you to decrease the amount of sodium in your diet. Lowering sodium while following the DASH diet can lower blood pressure even further than just the DASH diet alone.  Follow-up care is a key part of your treatment and safety. Be sure to make and go to all appointments, and call your doctor if you are having problems. It's also a good idea to know your test results and keep a list of the medicines you take.  How can you care for yourself at home?  Following the DASH diet  Eat 4 to 5 servings of fruit each day. A serving is 1 medium-sized piece of fruit, ½ cup chopped or canned fruit, 1/4 cup dried fruit, or 4 ounces (½ cup) of fruit juice. Choose fruit more often than fruit juice.  Eat 4 to 5 servings of vegetables each day. A serving is 1 cup of lettuce or raw leafy vegetables, ½ cup of chopped or cooked vegetables, or 4 ounces (½ cup) of vegetable juice. Choose vegetables more often than vegetable juice.  Get 2 to 3 servings of low-fat and fat-free dairy each day. A serving is 8 ounces of milk, 1 cup of yogurt, or 1 ½ ounces of cheese.  Eat 6 to 8 servings of grains each day. A serving is 1 slice of bread, 1 ounce of dry cereal, or ½ cup of cooked rice, pasta, or cooked cereal. Try to choose whole-grain products as much as possible.  Limit lean meat, poultry, and fish to 2 servings each day. A serving is 3 ounces, about  the size of a deck of cards. Eat 4 to 5 servings of nuts, seeds, and legumes (cooked dried beans, lentils, and split peas) each week. A serving is 1/3 cup of nuts, 2 tablespoons of seeds, or ½ cup of cooked beans or peas. Limit fats and oils to 2 to 3 servings each day. A serving is 1 teaspoon of vegetable oil or 2 tablespoons of salad dressing. Limit sweets and added sugars to 5 servings or less a week. A serving is 1 tablespoon jelly or jam, ½ cup sorbet, or 1 cup of lemonade. Eat less than 2,300 milligrams (mg) of sodium a day. If you limit your sodium to 1,500 mg a day, you can lower your blood pressure even more. Be aware that all of these are the suggested number of servings for people who eat 1,800 to 2,000 calories a day. Your recommended number of servings may be different if you need more or fewer calories. Tips for success  Start small. Do not try to make dramatic changes to your diet all at once. You might feel that you are missing out on your favorite foods and then be more likely to not follow the plan. Make small changes, and stick with them. Once those changes become habit, add a few more changes. Try some of the following:  Make it a goal to eat a fruit or vegetable at every meal and at snacks. This will make it easy to get the recommended amount of fruits and vegetables each day. Try yogurt topped with fruit and nuts for a snack or healthy dessert. Add lettuce, tomato, cucumber, and onion to sandwiches. Combine a ready-made pizza crust with low-fat mozzarella cheese and lots of vegetable toppings. Try using tomatoes, squash, spinach, broccoli, carrots, cauliflower, and onions. Have a variety of cut-up vegetables with a low-fat dip as an appetizer instead of chips and dip. Sprinkle sunflower seeds or chopped almonds over salads. Or try adding chopped walnuts or almonds to cooked vegetables. Try some vegetarian meals using beans and peas. Add garbanzo or kidney beans to salads.  Make burritos and tacos with mashed hebert beans or black beans. Where can you learn more? Go to http://www.woods.com/ and enter H967 to learn more about \"DASH Diet: Care Instructions. \"  Current as of: September 7, 2022               Content Version: 13.5  © 9560-4654 [employer]. Care instructions adapted under license by [facility] ([facility]). If you have questions about a medical condition or this instruction, always ask your healthcare professional. [address] any warranty or liability for your use of this information. Learning About Low-Carbohydrate Diets  What is a low-carbohydrate diet? A low-carbohydrate (or \"low-carb\") diet limits foods and drinks that have carbohydrates. This includes grains, fruits, milk and yogurt, and starchy vegetables like potatoes, beans, and corn. It also avoids foods and drinks that have added sugar. Instead, low-carb diets include foods that are high in protein and fat. Why might you follow a low-carb diet? Low-carb diets may be used for a variety of reasons, such as for weight loss. People who have diabetes may use a low-carb diet to help manage their blood sugar levels. What should you do before you start the diet? Talk to your doctor before you try any diet. This is even more important if you have health problems like kidney disease, heart disease, or diabetes. Your doctor may suggest that you meet with a registered dietitian. A dietitian can help you make an eating plan that works for you. What foods do you eat on a low-carb diet? On a low-carb diet, you choose foods that are high in protein and fat. Examples of these are:  Meat, poultry, and fish. Eggs. Nuts, such as walnuts, pecans, almonds, and peanuts. Peanut butter and other nut butters. Tofu. Avocado. Tereasa Shields. Non-starchy vegetables like broccoli, cauliflower, green beans, mushrooms, peppers, lettuce, and spinach.   Unsweetened non-dairy milks like almond milk and coconut milk. Cheese, cottage cheese, and cream cheese. Where can you learn more? Go to http://www.woods.com/ and enter C335 to learn more about \"Learning About Low-Carbohydrate Diets. \"  Current as of: May 9, 2022               Content Version: 13.5  © 9446-8317 [employer]wise, [employer]. Care instructions adapted under license by [facility] ([facility]). If you have questions about a medical condition or this instruction, always ask your healthcare professional. [place] [address] any warranty or liability for your use of this information. Personalized Preventive Plan for Marlena Spare - 1/11/2023  Medicare offers a range of preventive health benefits. Some of the tests and screenings are paid in full while other may be subject to a deductible, co-insurance, and/or copay. Some of these benefits include a comprehensive review of your medical history including lifestyle, illnesses that may run in your family, and various assessments and screenings as appropriate. After reviewing your medical record and screening and assessments performed today your provider may have ordered immunizations, labs, imaging, and/or referrals for you. A list of these orders (if applicable) as well as your Preventive Care list are included within your After Visit Summary for your review. Other Preventive Recommendations:    A preventive eye exam performed by an eye specialist is recommended every 1-2 years to screen for glaucoma; cataracts, macular degeneration, and other eye disorders. A preventive dental visit is recommended every 6 months. Try to get at least 150 minutes of exercise per week or 10,000 steps per day on a pedometer . Order or download the FREE \"Exercise & Physical Activity: Your Everyday Guide\" from [employer]. Call 1-915.225.1932 or search [employer] online. You need 8610-0467 mg of calcium and 2907-5229 IU of vitamin D per day. It is possible to meet your calcium requirement with diet alone, but a vitamin D supplement is usually necessary to meet this goal.  When exposed to the sun, use a sunscreen that protects against both UVA and UVB radiation with an SPF of 30 or greater. Reapply every 2 to 3 hours or after sweating, drying off with a towel, or swimming. Always wear a seat belt when traveling in a car. Always wear a helmet when riding a bicycle or motorcycle.

## 2023-01-11 NOTE — PROGRESS NOTES
[address] [facility]   [address]  [phone]    2023     CHIEF COMPLAINT:     Carlos Herman (:  1965) is a 59 y.o. male, here for evaluation of the following chief complaint(s):  Medicare AWV, Hypertension (3 month f/u), and Discuss Labs (Labs were completed on , )      REVIEWED INFORMATION      No Known Allergies    Current Outpatient Medications   Medication Sig Dispense Refill    montelukast (SINGULAIR) 10 MG tablet take 1 tablet by mouth once daily 30 tablet 3    dilTIAZem (CARDIZEM CD) 360 MG extended release capsule Take 1 capsule by mouth daily 30 capsule 1    esomeprazole (NEXIUM) 20 MG delayed release capsule take 1 capsule by mouth every morning before breakfast 90 capsule 0    metFORMIN (GLUCOPHAGE) 500 MG tablet Take 1 tablet by mouth 2 times daily (with meals) 180 tablet 1    hydroCHLOROthiazide (HYDRODIURIL) 25 MG tablet Take 1 tablet by mouth every morning 30 tablet 1    atorvastatin (LIPITOR) 80 MG tablet take 1 tablet by mouth once daily 90 tablet 1    losartan (COZAAR) 100 MG tablet Take 1 tablet by mouth daily 90 tablet 1    VITAMIN E PO Take 1 tablet by mouth daily OTC      DULoxetine (CYMBALTA) 60 MG extended release capsule Take 1 capsule by mouth daily 90 capsule 1    ibuprofen (ADVIL;MOTRIN) 800 MG tablet Take 800 mg by mouth 2 times daily as needed for Pain      mometasone (NASONEX) 50 MCG/ACT nasal spray 2 sprays by Nasal route daily as needed (allergies) 1 Inhaler 5    cetirizine (ZYRTEC) 10 MG tablet Take 10 mg by mouth daily       No current facility-administered medications for this visit.         Patient Care Team:  CARLOS Reese CNP as PCP - General (Nurse Practitioner)  CARLOS Reese CNP as PCP - [facility] Provider  Pavel Salmeron MD as Consulting Physician (Infectious Diseases)  Khadar Kam MD as Consulting Physician (Pulmonary Disease)  Batsheva Valero MD as Consulting Physician (Anesthesiology)  Shikha Moore MD as Consulting Physician (Gastroenterology)    REVIEW OF SYSTEMS:     Review of Systems   Constitutional:  Negative for activity change, fatigue and unexpected weight change. HENT:  Negative for congestion, ear pain, hearing loss, rhinorrhea and sore throat. Respiratory:  Negative for cough and shortness of breath. Cardiovascular:  Negative for chest pain, palpitations and leg swelling. Gastrointestinal:  Negative for constipation and diarrhea. Musculoskeletal:  Negative for arthralgias and gait problem. Neurological:  Negative for dizziness, weakness and headaches. Psychiatric/Behavioral:  Negative for confusion. The patient is not nervous/anxious. HISTORY OF PRESENT ILLNESS     Hypertension  Blood pressure is elevated today. Patient's current medication list includes cardizem 180, patient reports that they are taking the medication as prescribed. Due to fluctuation in blood pressure noted on today's visit we have discussed increasing dosage form 180 mg to 360 mg . . Patient denies any chest pain, shortness of breath, or palpitations. Patient is overall stable. Patient has been advised to monitor blood pressure daily, instructions have been given to [facility] or other notification to office in regards to blood pressure changes. Short-term follow-up has been discussed with patient and they are to be scheduled in accordance to what we have discussed. PHYSICAL EXAM:     BP (!) 150/84 (Site: Left Upper Arm, Position: Sitting, Cuff Size: Medium Adult)   Pulse 90   Temp 98.6 °F (37 °C) (Tympanic)   Ht 6' 3.25\" (1.911 m)   Wt 277 lb (125.6 kg)   SpO2 96%   BMI 34.39 kg/m²      Physical Exam  Vitals reviewed. Constitutional:       Appearance: Normal appearance. He is not ill-appearing. Cardiovascular:      Rate and Rhythm: Normal rate and regular rhythm. Heart sounds: No murmur heard. No friction rub. No gallop.    Pulmonary: Effort: Pulmonary effort is normal. No respiratory distress. Breath sounds: No wheezing. Abdominal:      General: Bowel sounds are normal.      Palpations: Abdomen is soft. Tenderness: There is no abdominal tenderness. Musculoskeletal:      Right lower leg: No edema. Left lower leg: No edema. Skin:     General: Skin is warm. Findings: No erythema, lesion or rash. Neurological:      Mental Status: He is alert. Psychiatric:         Mood and Affect: Mood normal.         Behavior: Behavior is cooperative. PROCEDURE/ IN OFFICE TESTING/ LAB REVIEW     No in office testing or procedures completed during today's office visit. ASSESSMENT/PLAN/ FOLLOWUP:     PLEASE NOTE THAT ANY DISCONTINUATION OF MEDICATIONS OR MEDICAL SUPPLIES REFLECTED IN TODAY'S VISIT SUMMARY  MAY NOT HAVE COMPLETED AS A CHANGE IN YOUR PLAN OF CARE. THESE CHANGES MAY HAVE ONLY BEEN DONE SO IN ORDER TO CLEAN UP LIST FROM DUPLICATIONS OR MISCELLANEOUS SUPPLIES ONLY NEEDED PERIODIC REORDERS. DO NOT DISCONTINUE MEDICATIONS LISTED UNLESS SPECIFICALLY DISCUSSED IN YOUR APPOINTMENT WITH PROVIDER OR SPECIALIST, IF YOU HAVE AN QUESTIONS, PLEASE CONTACT YOUR PROVIDER FOR CLARIFICATION IF NOT ADDRESSED IN YOUR PLAN OF CARE. 1. Medicare annual wellness visit, subsequent  2. Essential hypertension  -     dilTIAZem (CARDIZEM CD) 360 MG extended release capsule; Take 1 capsule by mouth daily, Disp-30 capsule, R-1Normal  -     MyChart Blood Pressure Flowsheet  -     Home Sleep Study; Future  -     POCT microalbumin  3. Major depressive disorder, recurrent episode, mild ([facility] [address]  4. Sleep apnea, unspecified type  -     Home Sleep Study; Future    Return for Medicare Annual Wellness Visit in 1 year. COMMUNICATION:             The best way to find yourself is to lose yourself in the service of others - [address] [place] [address]. [facility]   [email or web address]  Office: [phone]     An electronic signature was used to authenticate this note. Signed by CARLOS Welsh CNP, APRN-CNP on 1/30/2023 at 3:10 PM  Medicare Annual Wellness Visit    Janes Carrillo is here for Medicare AWV, Hypertension (3 month f/u), and Discuss Labs (Labs were completed on 12/7, 1/9)    Assessment & Plan   Medicare annual wellness visit, subsequent  Essential hypertension  -     dilTIAZem (CARDIZEM CD) 360 MG extended release capsule; Take 1 capsule by mouth daily, Disp-30 capsule, R-1Normal  -     MyChart Blood Pressure Flowsheet  -     Home Sleep Study; Future  -     POCT microalbumin  Major depressive disorder, recurrent episode, mild (HCC)  Sleep apnea, unspecified type  -     Home Sleep Study; Future      Recommendations for Preventive Services Due: see orders and patient instructions/AVS.  Recommended screening schedule for the next 5-10 years is provided to the patient in written form: see Patient Instructions/AVS.     Return for Medicare Annual Wellness Visit in 1 year. Subjective       Patient's complete Health Risk Assessment and screening values have been reviewed and are found in Flowsheets. The following problems were reviewed today and where indicated follow up appointments were made and/or referrals ordered.     Positive Risk Factor Screenings with Interventions:    Fall Risk:  Do you feel unsteady or are you worried about falling? : (!) yes  2 or more falls in past year?: no  Fall with injury in past year?: no     Interventions:    Patient declines any further evaluation or treatment      Interventions:  Patient declines any further evaluation or treatment          General HRA Questions:  Select all that apply: (!) Stress    Stress Interventions:  Patient declined any further interventions or treatment       Weight and Activity:  Physical Activity: Insufficiently Active    Days of Exercise per Week: 1 day    Minutes of Exercise per Session: 10 min     On average, how many days per week do you engage in moderate to strenuous exercise (like a brisk walk)?: 1 day  Have you lost any weight without trying in the past 3 months?: No  Body mass index: (!) 34.39    Obesity Interventions:  See AVS for additional education material            Vision Screen:  Do you have difficulty driving, watching TV, or doing any of your daily activities because of your eyesight?: No  Have you had an eye exam within the past year?: (!) No  No results found.    Interventions:   Patient encouraged to make appointment with their eye specialist    Safety:  Do you have any tripping hazards - loose or unsecured carpets or rugs?: (!) Yes  Do you have any tripping hazards - clutter in doorways, halls, or stairs?: (!) Yes  Interventions:  See AVS for additional education material    ADL's:   Patient reports needing help with:  Select all that apply: (!) Laundry, Banking/Finances, Food Preparation  Interventions:  Patient declined any further interventions or treatment                    Objective   Vitals:    01/11/23 1359 01/11/23 1409 01/11/23 1440   BP: (!) 162/94 (!) 148/94 (!) 150/84   Site: Left Upper Arm Left Upper Arm Left Upper Arm   Position: Sitting Sitting Sitting   Cuff Size: Medium Adult Medium Adult Medium Adult   Pulse: 90     Temp: 98.6 °F (37 °C)     TempSrc: Tympanic     SpO2: 96%     Weight: 277 lb (125.6 kg)     Height: 6' 3.25\" (1.911 m)        Body mass index is 34.39 kg/m².               No Known Allergies  Prior to Visit Medications    Medication Sig Taking? Authorizing Provider   montelukast (SINGULAIR) 10 MG tablet take 1 tablet by mouth once daily  CARLOS Oliveros CNP   dilTIAZem (CARDIZEM CD) 360 MG extended release capsule Take 1 capsule by mouth daily Yes CARLOS Olivreos CNP   esomeprazole (NEXIUM) 20 MG delayed release capsule take 1 capsule by mouth every morning before breakfast Yes CARLOS Palomino NP   metFORMIN (GLUCOPHAGE) 500 MG tablet Take 1 tablet by mouth  2 times daily (with meals) Yes CARLOS Beal CNP   hydroCHLOROthiazide (HYDRODIURIL) 25 MG tablet Take 1 tablet by mouth every morning Yes CARLOS Beal CNP   atorvastatin (LIPITOR) 80 MG tablet take 1 tablet by mouth once daily Yes CARLOS Beal CNP   losartan (COZAAR) 100 MG tablet Take 1 tablet by mouth daily Yes CARLOS Beal CNP   VITAMIN E PO Take 1 tablet by mouth daily OTC Yes Historical Provider, MD   DULoxetine (CYMBALTA) 60 MG extended release capsule Take 1 capsule by mouth daily Yes CARLOS Beal CNP   ibuprofen (ADVIL;MOTRIN) 800 MG tablet Take 800 mg by mouth 2 times daily as needed for Pain Yes Historical Provider, MD   mometasone (NASONEX) 50 MCG/ACT nasal spray 2 sprays by Nasal route daily as needed (allergies) Yes CARLOS Ye CNP   cetirizine (ZYRTEC) 10 MG tablet Take 10 mg by mouth daily Yes Historical Provider, MD Judd (Including outside providers/suppliers regularly involved in providing care):   Patient Care Team:  CARLOS Beal CNP as PCP - General (Nurse Practitioner)  CARLOS Beal CNP as PCP - [facility]  Miki Shine MD as Consulting Physician (Infectious Diseases)  Jesi Geller MD as Consulting Physician (Pulmonary Disease)  Loretta Cruz MD as Consulting Physician (Anesthesiology)  Omkar Kwong MD as Consulting Physician (Gastroenterology)     Reviewed and updated this visit:  Allergies  Meds

## 2023-01-30 DIAGNOSIS — J30.2 SEASONAL ALLERGIC RHINITIS, UNSPECIFIED TRIGGER: ICD-10-CM

## 2023-01-30 RX ORDER — MONTELUKAST SODIUM 10 MG/1
TABLET ORAL
Qty: 30 TABLET | Refills: 3 | Status: SHIPPED | OUTPATIENT
Start: 2023-01-30

## 2023-01-30 ASSESSMENT — ENCOUNTER SYMPTOMS
COUGH: 0
DIARRHEA: 0
RHINORRHEA: 0
SORE THROAT: 0
CONSTIPATION: 0
SHORTNESS OF BREATH: 0

## 2023-01-30 NOTE — TELEPHONE ENCOUNTER
LOV: 1/11/2023    LRF: 10/12/2022    Health Maintenance   Topic Date Due    Diabetic retinal exam  Never done    Colorectal Cancer Screen  10/11/2022    Hepatitis B vaccine (1 of 3 - Risk 3-dose series) 11/29/2023 (Originally 11/6/1982)    Shingles vaccine (1 of 2) 11/29/2023 (Originally 11/6/2013)    Diabetic foot exam  01/11/2024 (Originally 11/6/1973)    A1C test (Diabetic or Prediabetic)  11/10/2023    Lipids  12/07/2023    GFR test (Diabetes, CKD 3-4, OR last GFR 15-59)  01/09/2024    Diabetic Alb to Cr ratio (uACR) test  01/11/2024    Depression Monitoring  01/11/2024    Annual Wellness Visit (AWV)  01/12/2024    DTaP/Tdap/Td vaccine (2 - Td or Tdap) 07/12/2026    Flu vaccine  Completed    Pneumococcal 0-64 years Vaccine  Completed    COVID-19 Vaccine  Completed    Hepatitis C screen  Completed    HIV screen  Completed    Hepatitis A vaccine  Aged Out    Hib vaccine  Aged Out    Meningococcal (ACWY) vaccine  Aged Out             (applicable per patient's age: Cancer Screenings, Depression Screening, Fall Risk Screening, Immunizations)    Hemoglobin A1C (%)   Date Value   11/10/2022 6.0   10/27/2021 5.6   11/16/2020 6.0     LDL Cholesterol (mg/dL)   Date Value   12/07/2022 58     AST (U/L)   Date Value   12/07/2022 25     ALT (U/L)   Date Value   12/07/2022 23     BUN (mg/dL)   Date Value   01/09/2023 11      (goal A1C is < 7)   (goal LDL is <100) need 30-50% reduction from baseline     BP Readings from Last 3 Encounters:   01/11/23 (!) 150/84   11/29/22 130/86   10/12/22 (!) 158/96    (goal /80)      All Future Testing planned in [facility]:  Lab Frequency Next Occurrence   Home Sleep Study Once 01/12/2023       Next Visit Date:  Future Appointments   Date Time Provider Donovan Peraza   2/23/2023  2:30 PM CARLOS Velasco - CNP [place]  [facility]            Patient Active Problem List:     Hyperlipidemia     Essential hypertension     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 9)     Chronic pain disorder     Primary osteoarthritis     Prediabetes     History of hip replacement, total, bilateral     Hx of fusion of cervical spine     Seasonal allergic rhinitis

## 2023-01-31 DIAGNOSIS — I10 UNCONTROLLED HYPERTENSION: ICD-10-CM

## 2023-01-31 DIAGNOSIS — I10 ESSENTIAL HYPERTENSION: Primary | ICD-10-CM

## 2023-03-03 ENCOUNTER — HOSPITAL ENCOUNTER (OUTPATIENT)
Age: 58
Setting detail: SPECIMEN
Discharge: HOME | End: 2023-03-03

## 2023-03-04 LAB
ANION GAP SERPL CALCULATED.3IONS-SCNC: 14 MMOL/L (ref 9–17)
BUN SERPL-MCNC: 17 MG/DL (ref 6–20)
CALCIUM SERPL-MCNC: 9.9 MG/DL (ref 8.6–10.4)
CHLORIDE SERPL-SCNC: 99 MMOL/L (ref 98–107)
CO2 SERPL-SCNC: 27 MMOL/L (ref 20–31)
CREAT SERPL-MCNC: 0.99 MG/DL (ref 0.7–1.2)
GFR SERPL CREATININE-BSD FRML MDRD: >60 ML/MIN/1.73M2
GLUCOSE SERPL-MCNC: 61 MG/DL (ref 70–99)
POTASSIUM SERPL-SCNC: 4.8 MMOL/L (ref 3.7–5.3)
SODIUM SERPL-SCNC: 140 MMOL/L (ref 135–144)

## 2023-03-12 DIAGNOSIS — I10 ESSENTIAL HYPERTENSION: ICD-10-CM

## 2023-03-13 NOTE — TELEPHONE ENCOUNTER
Request for Cardizem. Last script sent: 1/11/23  Last OV: 1/11/23  Next Visit Date:  No future appointments.     Health Maintenance   Topic Date Due    Diabetic retinal exam  Never done    Colorectal Cancer Screen  10/11/2022    Hepatitis B vaccine (1 of 3 - Risk 3-dose series) 11/29/2023 (Originally 11/6/1982)    Shingles vaccine (1 of 2) 11/29/2023 (Originally 11/6/2013)    Diabetic foot exam  01/11/2024 (Originally 11/6/1973)    A1C test (Diabetic or Prediabetic)  11/10/2023    Lipids  12/07/2023    Diabetic Alb to Cr ratio (uACR) test  01/11/2024    Depression Monitoring  01/11/2024    Annual Wellness Visit (AWV)  01/12/2024    GFR test (Diabetes, CKD 3-4, OR last GFR 15-59)  03/03/2024    DTaP/Tdap/Td vaccine (2 - Td or Tdap) 07/12/2026    Flu vaccine  Completed    Pneumococcal 0-64 years Vaccine  Completed    COVID-19 Vaccine  Completed    Hepatitis C screen  Completed    HIV screen  Completed    Hepatitis A vaccine  Aged Out    Hib vaccine  Aged Out    Meningococcal (ACWY) vaccine  Aged Out       Hemoglobin A1C (%)   Date Value   11/10/2022 6.0   10/27/2021 5.6   11/16/2020 6.0             ( goal A1C is < 7)   No results found for: LABMICR  LDL Cholesterol (mg/dL)   Date Value   12/07/2022 58       (goal LDL is <100)   AST (U/L)   Date Value   12/07/2022 25     ALT (U/L)   Date Value   12/07/2022 23     BUN (mg/dL)   Date Value   03/03/2023 17     BP Readings from Last 3 Encounters:   01/11/23 (!) 150/84   11/29/22 130/86   10/12/22 (!) 158/96          (goal 120/80)    All Future Testing planned in [facility]  Lab Frequency Next Occurrence   Home Sleep Study Once 01/12/2023         Patient Active Problem List:     Hyperlipidemia     Essential hypertension     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 9)     Chronic pain disorder     Primary osteoarthritis     Prediabetes     History of hip replacement, total, bilateral     Hx of fusion of cervical spine     Seasonal allergic rhinitis

## 2023-03-14 RX ORDER — DILTIAZEM HYDROCHLORIDE 360 MG/1
360 CAPSULE, EXTENDED RELEASE ORAL DAILY
Qty: 30 CAPSULE | Refills: 5 | Status: SHIPPED | OUTPATIENT
Start: 2023-03-14

## 2023-03-29 DIAGNOSIS — K21.9 GASTROESOPHAGEAL REFLUX DISEASE WITHOUT ESOPHAGITIS: ICD-10-CM

## 2023-03-29 NOTE — TELEPHONE ENCOUNTER
without gangrene     Gastroesophageal reflux disease without esophagitis     Diverticulosis of large intestine without hemorrhage     Obesity (BMI 30.0-34. 9)     Chronic pain disorder     Primary osteoarthritis     Prediabetes     History of hip replacement, total, bilateral     Hx of fusion of cervical spine     Seasonal allergic rhinitis

## 2023-04-05 ENCOUNTER — HOSPITAL ENCOUNTER (OUTPATIENT)
Age: 58
Setting detail: SPECIMEN
Discharge: HOME | End: 2023-04-05

## 2023-04-05 LAB
ANION GAP SERPL CALCULATED.3IONS-SCNC: 17 MMOL/L (ref 9–17)
BUN SERPL-MCNC: 16 MG/DL (ref 6–20)
CALCIUM SERPL-MCNC: 9.9 MG/DL (ref 8.6–10.4)
CHLORIDE SERPL-SCNC: 92 MMOL/L (ref 98–107)
CO2 SERPL-SCNC: 24 MMOL/L (ref 20–31)
CREAT SERPL-MCNC: 1.02 MG/DL (ref 0.7–1.2)
GFR SERPL CREATININE-BSD FRML MDRD: >60 ML/MIN/1.73M2
GLUCOSE SERPL-MCNC: 102 MG/DL (ref 70–99)
POTASSIUM SERPL-SCNC: 4.5 MMOL/L (ref 3.7–5.3)
SODIUM SERPL-SCNC: 133 MMOL/L (ref 135–144)

## 2023-04-19 ENCOUNTER — HOSPITAL ENCOUNTER (OUTPATIENT)
Age: 58
Setting detail: SPECIMEN
Discharge: HOME | End: 2023-04-19

## 2023-04-19 LAB
ANION GAP SERPL CALCULATED.3IONS-SCNC: 16 MMOL/L (ref 9–17)
BUN SERPL-MCNC: 16 MG/DL (ref 6–20)
CALCIUM SERPL-MCNC: 10 MG/DL (ref 8.6–10.4)
CHLORIDE SERPL-SCNC: 96 MMOL/L (ref 98–107)
CO2 SERPL-SCNC: 22 MMOL/L (ref 20–31)
CREAT SERPL-MCNC: 1.03 MG/DL (ref 0.7–1.2)
GFR SERPL CREATININE-BSD FRML MDRD: >60 ML/MIN/1.73M2
GLUCOSE SERPL-MCNC: 109 MG/DL (ref 70–99)
POTASSIUM SERPL-SCNC: 5.1 MMOL/L (ref 3.7–5.3)
SODIUM SERPL-SCNC: 134 MMOL/L (ref 135–144)

## 2023-05-13 DIAGNOSIS — M51.26 LUMBAGO DUE TO DISPLACEMENT OF INTERVERTEBRAL DISC: ICD-10-CM

## 2023-05-13 DIAGNOSIS — F33.0 MAJOR DEPRESSIVE DISORDER, RECURRENT EPISODE, MILD (HCC): ICD-10-CM

## 2023-05-15 RX ORDER — DULOXETIN HYDROCHLORIDE 60 MG/1
60 CAPSULE, DELAYED RELEASE ORAL DAILY
Qty: 90 CAPSULE | Refills: 1 | Status: SHIPPED | OUTPATIENT
Start: 2023-05-15

## 2023-05-18 DIAGNOSIS — F33.0 MAJOR DEPRESSIVE DISORDER, RECURRENT EPISODE, MILD (HCC): ICD-10-CM

## 2023-05-18 DIAGNOSIS — M51.26 LUMBAGO DUE TO DISPLACEMENT OF INTERVERTEBRAL DISC: ICD-10-CM

## 2023-05-18 RX ORDER — DULOXETIN HYDROCHLORIDE 60 MG/1
CAPSULE, DELAYED RELEASE ORAL
Qty: 90 CAPSULE | Refills: 1 | OUTPATIENT
Start: 2023-05-18

## 2023-05-29 DIAGNOSIS — J30.2 SEASONAL ALLERGIC RHINITIS, UNSPECIFIED TRIGGER: ICD-10-CM

## 2023-05-30 RX ORDER — MONTELUKAST SODIUM 10 MG/1
10 TABLET ORAL NIGHTLY
Qty: 30 TABLET | Refills: 5 | Status: SHIPPED | OUTPATIENT
Start: 2023-05-30

## 2023-07-01 DIAGNOSIS — K21.9 GASTROESOPHAGEAL REFLUX DISEASE WITHOUT ESOPHAGITIS: ICD-10-CM

## 2023-07-05 NOTE — TELEPHONE ENCOUNTER
LOV: 1/11/2023    RTO: No future appointments. LRF: 3/29/23          Controlled Substance Monitoring:    Acute and Chronic Pain Monitoring:   RX Monitoring 2/2/2019   Attestation The Prescription Monitoring Report for this patient was reviewed today. Periodic Controlled Substance Monitoring No signs of potential drug abuse or diversion identified.

## 2023-09-20 DIAGNOSIS — R22.43 LOCALIZED SWELLING OF BOTH LOWER LEGS: ICD-10-CM

## 2023-09-20 DIAGNOSIS — I10 ESSENTIAL HYPERTENSION: ICD-10-CM

## 2023-09-20 DIAGNOSIS — R73.01 IMPAIRED FASTING BLOOD SUGAR: ICD-10-CM

## 2023-09-20 RX ORDER — DILTIAZEM HYDROCHLORIDE 360 MG/1
360 CAPSULE, EXTENDED RELEASE ORAL DAILY
Qty: 90 CAPSULE | Refills: 0 | Status: SHIPPED | OUTPATIENT
Start: 2023-09-20

## 2023-09-20 RX ORDER — HYDROCHLOROTHIAZIDE 25 MG/1
25 TABLET ORAL EVERY MORNING
Qty: 90 TABLET | Refills: 0 | Status: SHIPPED | OUTPATIENT
Start: 2023-09-20 | End: 2024-09-20

## 2023-09-20 NOTE — TELEPHONE ENCOUNTER
LOV: 1/11/2023    RTO:   Future Appointments   Date Time Provider [address]   11/1/2023  1:00 PM Gerson Maroon[facility] APRN [facility] [place] [facility]     LRF: 12/27/22          Controlled Substance Monitoring:    Acute and Chronic Pain Monitoring:   RX Monitoring Attestation Periodic Controlled Substance Monitoring   2/2/2019  10:31 AM The Prescription Monitoring Report for this patient was reviewed today. No signs of potential drug abuse or diversion identified.

## 2023-11-06 DIAGNOSIS — M51.26 LUMBAGO DUE TO DISPLACEMENT OF INTERVERTEBRAL DISC: ICD-10-CM

## 2023-11-06 DIAGNOSIS — F33.0 MAJOR DEPRESSIVE DISORDER, RECURRENT EPISODE, MILD (HCC): ICD-10-CM

## 2023-11-06 RX ORDER — DULOXETIN HYDROCHLORIDE 60 MG/1
60 CAPSULE, DELAYED RELEASE ORAL DAILY
Qty: 90 CAPSULE | Refills: 1 | Status: SHIPPED | OUTPATIENT
Start: 2023-11-06

## 2023-11-06 NOTE — TELEPHONE ENCOUNTER
LOV: 1/11/2023    RTO:   Future Appointments   Date Time Provider [address]   11/28/2023  1:00 PM Magnus Hartley APRN [facility] [place] [facility]     LRF: 5/15/23          Controlled Substance Monitoring:    Acute and Chronic Pain Monitoring:   RX Monitoring Attestation Periodic Controlled Substance Monitoring   2/2/2019  10:31 AM The Prescription Monitoring Report for this patient was reviewed today. No signs of potential drug abuse or diversion identified.

## 2023-11-24 DIAGNOSIS — E78.00 PURE HYPERCHOLESTEROLEMIA: ICD-10-CM

## 2023-11-24 NOTE — TELEPHONE ENCOUNTER
LOV 1/11/2023   RTO 11/28/2023  LRF 6/13/2023          Controlled Substance Monitoring:    Acute and Chronic Pain Monitoring:   RX Monitoring Attestation Periodic Controlled Substance Monitoring   2/2/2019  10:31 AM The Prescription Monitoring Report for this patient was reviewed today. No signs of potential drug abuse or diversion identified.

## 2023-11-25 RX ORDER — ATORVASTATIN CALCIUM 80 MG/1
TABLET, FILM COATED ORAL
Qty: 90 TABLET | Refills: 1 | Status: SHIPPED | OUTPATIENT
Start: 2023-11-25

## 2023-11-28 ENCOUNTER — OFFICE VISIT (OUTPATIENT)
Dept: FAMILY MEDICINE CLINIC | Age: 58
End: 2023-11-28
Payer: MEDICARE

## 2023-11-28 VITALS
DIASTOLIC BLOOD PRESSURE: 88 MMHG | SYSTOLIC BLOOD PRESSURE: 134 MMHG | BODY MASS INDEX: 34.44 KG/M2 | HEART RATE: 100 BPM | OXYGEN SATURATION: 99 % | WEIGHT: 277 LBS | HEIGHT: 75 IN

## 2023-11-28 DIAGNOSIS — Z12.11 SCREENING FOR COLON CANCER: ICD-10-CM

## 2023-11-28 DIAGNOSIS — R73.01 IMPAIRED FASTING BLOOD SUGAR: ICD-10-CM

## 2023-11-28 DIAGNOSIS — I10 ESSENTIAL HYPERTENSION: ICD-10-CM

## 2023-11-28 DIAGNOSIS — Z23 NEED FOR INFLUENZA VACCINATION: Primary | ICD-10-CM

## 2023-11-28 PROCEDURE — G0008 ADMIN INFLUENZA VIRUS VAC: HCPCS | Performed by: REGISTERED NURSE

## 2023-11-28 PROCEDURE — 90674 CCIIV4 VAC NO PRSV 0.5 ML IM: CPT | Performed by: REGISTERED NURSE

## 2023-11-28 PROCEDURE — 3075F SYST BP GE 130 - 139MM HG: CPT | Performed by: REGISTERED NURSE

## 2023-11-28 PROCEDURE — 3079F DIAST BP 80-89 MM HG: CPT | Performed by: REGISTERED NURSE

## 2023-11-28 PROCEDURE — 99203 OFFICE O/P NEW LOW 30 MIN: CPT | Performed by: REGISTERED NURSE

## 2023-11-28 RX ORDER — IBUPROFEN 800 MG/1
800 TABLET ORAL 2 TIMES DAILY PRN
Qty: 120 TABLET | Refills: 2 | Status: SHIPPED | OUTPATIENT
Start: 2023-11-28

## 2023-11-28 RX ORDER — FUROSEMIDE 40 MG/1
40 TABLET ORAL DAILY
COMMUNITY
Start: 2023-02-14

## 2023-11-28 RX ORDER — CARVEDILOL 12.5 MG/1
12.5 TABLET ORAL 2 TIMES DAILY
COMMUNITY
Start: 2023-08-08

## 2023-11-28 SDOH — ECONOMIC STABILITY: FOOD INSECURITY: WITHIN THE PAST 12 MONTHS, THE FOOD YOU BOUGHT JUST DIDN'T LAST AND YOU DIDN'T HAVE MONEY TO GET MORE.: NEVER TRUE

## 2023-11-28 SDOH — ECONOMIC STABILITY: INCOME INSECURITY: HOW HARD IS IT FOR YOU TO PAY FOR THE VERY BASICS LIKE FOOD, HOUSING, MEDICAL CARE, AND HEATING?: NOT HARD AT ALL

## 2023-11-28 SDOH — ECONOMIC STABILITY: FOOD INSECURITY: WITHIN THE PAST 12 MONTHS, YOU WORRIED THAT YOUR FOOD WOULD RUN OUT BEFORE YOU GOT MONEY TO BUY MORE.: NEVER TRUE

## 2023-11-28 SDOH — ECONOMIC STABILITY: HOUSING INSECURITY
IN THE LAST 12 MONTHS, WAS THERE A TIME WHEN YOU DID NOT HAVE A STEADY PLACE TO SLEEP OR SLEPT IN A SHELTER (INCLUDING NOW)?: NO

## 2023-11-28 ASSESSMENT — ANXIETY QUESTIONNAIRES
2. NOT BEING ABLE TO STOP OR CONTROL WORRYING: 0
3. WORRYING TOO MUCH ABOUT DIFFERENT THINGS: 0
IF YOU CHECKED OFF ANY PROBLEMS ON THIS QUESTIONNAIRE, HOW DIFFICULT HAVE THESE PROBLEMS MADE IT FOR YOU TO DO YOUR WORK, TAKE CARE OF THINGS AT HOME, OR GET ALONG WITH OTHER PEOPLE: NOT DIFFICULT AT ALL
7. FEELING AFRAID AS IF SOMETHING AWFUL MIGHT HAPPEN: 0
6. BECOMING EASILY ANNOYED OR IRRITABLE: 0
1. FEELING NERVOUS, ANXIOUS, OR ON EDGE: 0
GAD7 TOTAL SCORE: 0
4. TROUBLE RELAXING: 0
5. BEING SO RESTLESS THAT IT IS HARD TO SIT STILL: 0

## 2023-11-28 ASSESSMENT — PATIENT HEALTH QUESTIONNAIRE - PHQ9
SUM OF ALL RESPONSES TO PHQ QUESTIONS 1-9: 3
SUM OF ALL RESPONSES TO PHQ9 QUESTIONS 1 & 2: 0
8. MOVING OR SPEAKING SO SLOWLY THAT OTHER PEOPLE COULD HAVE NOTICED. OR THE OPPOSITE, BEING SO FIGETY OR RESTLESS THAT YOU HAVE BEEN MOVING AROUND A LOT MORE THAN USUAL: 0
2. FEELING DOWN, DEPRESSED OR HOPELESS: 0
9. THOUGHTS THAT YOU WOULD BE BETTER OFF DEAD, OR OF HURTING YOURSELF: 0
4. FEELING TIRED OR HAVING LITTLE ENERGY: 1
3. TROUBLE FALLING OR STAYING ASLEEP: 1
7. TROUBLE CONCENTRATING ON THINGS, SUCH AS READING THE NEWSPAPER OR WATCHING TELEVISION: 0
1. LITTLE INTEREST OR PLEASURE IN DOING THINGS: 0
6. FEELING BAD ABOUT YOURSELF - OR THAT YOU ARE A FAILURE OR HAVE LET YOURSELF OR YOUR FAMILY DOWN: 1
SUM OF ALL RESPONSES TO PHQ QUESTIONS 1-9: 3
5. POOR APPETITE OR OVEREATING: 0
10. IF YOU CHECKED OFF ANY PROBLEMS, HOW DIFFICULT HAVE THESE PROBLEMS MADE IT FOR YOU TO DO YOUR WORK, TAKE CARE OF THINGS AT HOME, OR GET ALONG WITH OTHER PEOPLE: 0

## 2023-11-28 ASSESSMENT — ENCOUNTER SYMPTOMS
SHORTNESS OF BREATH: 0
DIARRHEA: 0
VOMITING: 0
COUGH: 0
CONSTIPATION: 0
EYE ITCHING: 1
EYE PAIN: 1

## 2023-11-28 ASSESSMENT — COLUMBIA-SUICIDE SEVERITY RATING SCALE - C-SSRS
4. HAVE YOU HAD THESE THOUGHTS AND HAD SOME INTENTION OF ACTING ON THEM?: NO
5. HAVE YOU STARTED TO WORK OUT OR WORKED OUT THE DETAILS OF HOW TO KILL YOURSELF? DO YOU INTEND TO CARRY OUT THIS PLAN?: NO
3. HAVE YOU BEEN THINKING ABOUT HOW YOU MIGHT KILL YOURSELF?: NO

## 2023-12-05 DIAGNOSIS — J30.2 SEASONAL ALLERGIC RHINITIS, UNSPECIFIED TRIGGER: ICD-10-CM

## 2023-12-05 NOTE — TELEPHONE ENCOUNTER
LOV: 11/28/2023    RTO:   Future Appointments   Date Time Provider [address]   5/28/2024  1:15 PM [facility] Hart[facility] [place] [facility]     LRF: 5/30/23          Controlled Substance Monitoring:    Acute and Chronic Pain Monitoring:   RX Monitoring Attestation Periodic Controlled Substance Monitoring   2/2/2019  10:31 AM The Prescription Monitoring Report for this patient was reviewed today. No signs of potential drug abuse or diversion identified.

## 2023-12-06 RX ORDER — MONTELUKAST SODIUM 10 MG/1
10 TABLET ORAL NIGHTLY
Qty: 30 TABLET | Refills: 5 | Status: SHIPPED | OUTPATIENT
Start: 2023-12-06

## 2023-12-11 DIAGNOSIS — I10 ESSENTIAL HYPERTENSION: ICD-10-CM

## 2023-12-11 RX ORDER — LOSARTAN POTASSIUM 100 MG/1
100 TABLET ORAL DAILY
Qty: 90 TABLET | Refills: 1 | Status: SHIPPED | OUTPATIENT
Start: 2023-12-11

## 2023-12-11 NOTE — TELEPHONE ENCOUNTER
LOV: 11/28/2023    RTO:   Future Appointments   Date Time Provider [address]   5/28/2024  1:15 PM Odilia [address]Berenice Jara, APRN - [facility]     LRF: 6/13/23          Controlled Substance Monitoring:    Acute and Chronic Pain Monitoring:   RX Monitoring Attestation Periodic Controlled Substance Monitoring   2/2/2019  10:31 AM The Prescription Monitoring Report for this patient was reviewed today. No signs of potential drug abuse or diversion identified.

## 2024-04-27 DIAGNOSIS — F33.0 MAJOR DEPRESSIVE DISORDER, RECURRENT EPISODE, MILD (HCC): ICD-10-CM

## 2024-04-27 DIAGNOSIS — M51.26 LUMBAGO DUE TO DISPLACEMENT OF INTERVERTEBRAL DISC: ICD-10-CM

## 2024-04-29 RX ORDER — DULOXETIN HYDROCHLORIDE 60 MG/1
60 CAPSULE, DELAYED RELEASE ORAL DAILY
Qty: 90 CAPSULE | Refills: 1 | Status: SHIPPED | OUTPATIENT
Start: 2024-04-29

## 2024-04-29 NOTE — TELEPHONE ENCOUNTER
LOV: 11/28/2023    RTO:   Future Appointments   Date Time Provider Department Center   5/28/2024  1:20 PM Alan Hartley APRN - CNP [place] [facility]     LRF: 11/6/23          Controlled Substance Monitoring:    Acute and Chronic Pain Monitoring:   RX Monitoring Attestation Periodic Controlled Substance Monitoring   2/2/2019  10:31 AM The Prescription Monitoring Report for this patient was reviewed today. No signs of potential drug abuse or diversion identified.

## 2024-05-11 DIAGNOSIS — E78.00 PURE HYPERCHOLESTEROLEMIA: ICD-10-CM

## 2024-05-13 RX ORDER — ATORVASTATIN CALCIUM 80 MG/1
TABLET, FILM COATED ORAL
Qty: 90 TABLET | Refills: 1 | Status: SHIPPED | OUTPATIENT
Start: 2024-05-13

## 2024-05-13 NOTE — TELEPHONE ENCOUNTER
LOV: 11/28/2023    RTO:   Future Appointments   Date Time Provider Department Center   5/28/2024  1:20 PM Alan Hartley APRN - CNP [place] [facility]     LRF: 11/25/23          Controlled Substance Monitoring:    Acute and Chronic Pain Monitoring:   RX Monitoring Attestation Periodic Controlled Substance Monitoring   2/2/2019  10:31 AM The Prescription Monitoring Report for this patient was reviewed today. No signs of potential drug abuse or diversion identified.

## 2024-05-30 DIAGNOSIS — J30.2 SEASONAL ALLERGIC RHINITIS, UNSPECIFIED TRIGGER: ICD-10-CM

## 2024-05-30 RX ORDER — MONTELUKAST SODIUM 10 MG/1
10 TABLET ORAL NIGHTLY
Qty: 30 TABLET | Refills: 5 | Status: SHIPPED | OUTPATIENT
Start: 2024-05-30

## 2024-05-30 NOTE — TELEPHONE ENCOUNTER
LOV: 11/28/2023    RTO: No future appointments.  LRF: 12/6/23          Controlled Substance Monitoring:    Acute and Chronic Pain Monitoring:   RX Monitoring Attestation Periodic Controlled Substance Monitoring   2/2/2019  10:31 AM The Prescription Monitoring Report for this patient was reviewed today. No signs of potential drug abuse or diversion identified.

## 2024-06-04 DIAGNOSIS — I10 ESSENTIAL HYPERTENSION: ICD-10-CM

## 2024-06-04 RX ORDER — LOSARTAN POTASSIUM 100 MG/1
100 TABLET ORAL DAILY
Qty: 10 TABLET | Refills: 0 | Status: SHIPPED | OUTPATIENT
Start: 2024-06-04

## 2024-06-04 NOTE — TELEPHONE ENCOUNTER
LOV 11/28/2023   RTO   LRF 12/11/2023          Controlled Substance Monitoring:    Acute and Chronic Pain Monitoring:   RX Monitoring Attestation Periodic Controlled Substance Monitoring   2/2/2019  10:31 AM The Prescription Monitoring Report for this patient was reviewed today. No signs of potential drug abuse or diversion identified.

## 2024-06-14 DIAGNOSIS — I10 ESSENTIAL HYPERTENSION: ICD-10-CM

## 2024-06-14 NOTE — TELEPHONE ENCOUNTER
LOV 11/28/23    RTO 6/25/24  LRF 6/4/24 for #10 tabs  Writer did not add quantity.      Controlled Substance Monitoring:    Acute and Chronic Pain Monitoring:   RX Monitoring Attestation Periodic Controlled Substance Monitoring   2/2/2019  10:31 AM The Prescription Monitoring Report for this patient was reviewed today. No signs of potential drug abuse or diversion identified.

## 2024-06-15 DIAGNOSIS — Z12.5 SCREENING PSA (PROSTATE SPECIFIC ANTIGEN): ICD-10-CM

## 2024-06-15 DIAGNOSIS — I10 ESSENTIAL HYPERTENSION: Primary | ICD-10-CM

## 2024-06-15 DIAGNOSIS — R73.01 IMPAIRED FASTING BLOOD SUGAR: ICD-10-CM

## 2024-06-15 DIAGNOSIS — R73.03 PREDIABETES: ICD-10-CM

## 2024-06-15 DIAGNOSIS — E55.9 VITAMIN D DEFICIENCY: ICD-10-CM

## 2024-06-15 DIAGNOSIS — K21.9 GASTROESOPHAGEAL REFLUX DISEASE WITHOUT ESOPHAGITIS: ICD-10-CM

## 2024-06-15 RX ORDER — LOSARTAN POTASSIUM 100 MG/1
100 TABLET ORAL DAILY
Qty: 30 TABLET | Refills: 0 | Status: SHIPPED | OUTPATIENT
Start: 2024-06-15

## 2024-06-15 NOTE — PROGRESS NOTES
Placing fasting labs, I will do a one month fill on his medication, please let him know he needs to complete prior to his next appointment

## 2024-06-17 DIAGNOSIS — K21.9 GASTROESOPHAGEAL REFLUX DISEASE WITHOUT ESOPHAGITIS: ICD-10-CM

## 2024-06-17 NOTE — TELEPHONE ENCOUNTER
LOV 11-28-23   RTO 6-25-24  LRF 12-20-23          Controlled Substance Monitoring:    Acute and Chronic Pain Monitoring:   RX Monitoring Attestation Periodic Controlled Substance Monitoring   2/2/2019  10:31 AM The Prescription Monitoring Report for this patient was reviewed today. No signs of potential drug abuse or diversion identified.

## 2024-06-17 NOTE — TELEPHONE ENCOUNTER
LOV: 11/28/2023    RTO:   Future Appointments   Date Time Provider Department Center   6/25/2024  2:00 PM Alan Hartley APRN - CNP [place] [facility]     LRF: 7/5/23          Controlled Substance Monitoring:    Acute and Chronic Pain Monitoring:   RX Monitoring Attestation Periodic Controlled Substance Monitoring   2/2/2019  10:31 AM The Prescription Monitoring Report for this patient was reviewed today. No signs of potential drug abuse or diversion identified.

## 2024-06-20 ENCOUNTER — HOSPITAL ENCOUNTER (OUTPATIENT)
Age: 59
Setting detail: SPECIMEN
Discharge: HOME | End: 2024-06-20

## 2024-06-20 DIAGNOSIS — I10 ESSENTIAL HYPERTENSION: ICD-10-CM

## 2024-06-20 DIAGNOSIS — K21.9 GASTROESOPHAGEAL REFLUX DISEASE WITHOUT ESOPHAGITIS: ICD-10-CM

## 2024-06-20 DIAGNOSIS — R73.03 PREDIABETES: ICD-10-CM

## 2024-06-20 DIAGNOSIS — E55.9 VITAMIN D DEFICIENCY: ICD-10-CM

## 2024-06-20 DIAGNOSIS — Z12.5 SCREENING PSA (PROSTATE SPECIFIC ANTIGEN): ICD-10-CM

## 2024-06-20 LAB
25(OH)D3 SERPL-MCNC: 23.5 NG/ML (ref 30–100)
CHOLEST SERPL-MCNC: 178 MG/DL (ref 0–199)
CHOLESTEROL/HDL RATIO: 3
ERYTHROCYTE [DISTWIDTH] IN BLOOD BY AUTOMATED COUNT: 14.2 % (ref 11.8–14.4)
HCT VFR BLD AUTO: 36.2 % (ref 40.7–50.3)
HDLC SERPL-MCNC: 64 MG/DL
HGB BLD-MCNC: 11.8 G/DL (ref 13–17)
LDLC SERPL CALC-MCNC: 61 MG/DL (ref 0–100)
MCH RBC QN AUTO: 32.6 PG (ref 25.2–33.5)
MCHC RBC AUTO-ENTMCNC: 32.6 G/DL (ref 28.4–34.8)
MCV RBC AUTO: 100 FL (ref 82.6–102.9)
NRBC BLD-RTO: 0 PER 100 WBC
PLATELET # BLD AUTO: 291 K/UL (ref 138–453)
PMV BLD AUTO: 10.1 FL (ref 8.1–13.5)
PSA SERPL-MCNC: 0.5 NG/ML (ref 0–4)
RBC # BLD AUTO: 3.62 M/UL (ref 4.21–5.77)
TRIGL SERPL-MCNC: 266 MG/DL (ref 0–149)
TSH SERPL DL<=0.05 MIU/L-ACNC: 1.61 UIU/ML (ref 0.27–4.2)
VLDLC SERPL CALC-MCNC: 53 MG/DL
WBC OTHER # BLD: 8.2 K/UL (ref 3.5–11.3)

## 2024-06-25 ENCOUNTER — OFFICE VISIT (OUTPATIENT)
Dept: FAMILY MEDICINE CLINIC | Age: 59
End: 2024-06-25
Payer: MEDICARE

## 2024-06-25 VITALS
WEIGHT: 280 LBS | BODY MASS INDEX: 34.1 KG/M2 | DIASTOLIC BLOOD PRESSURE: 86 MMHG | TEMPERATURE: 98.4 F | HEIGHT: 76 IN | OXYGEN SATURATION: 96 % | SYSTOLIC BLOOD PRESSURE: 146 MMHG | HEART RATE: 92 BPM

## 2024-06-25 DIAGNOSIS — T31.40 BURN (ANY DEGREE) INVOLVING 40-49% OF BODY SURFACE: ICD-10-CM

## 2024-06-25 DIAGNOSIS — I10 ESSENTIAL HYPERTENSION: ICD-10-CM

## 2024-06-25 DIAGNOSIS — E55.9 VITAMIN D DEFICIENCY: ICD-10-CM

## 2024-06-25 DIAGNOSIS — F33.0 MAJOR DEPRESSIVE DISORDER, RECURRENT EPISODE, MILD (HCC): ICD-10-CM

## 2024-06-25 DIAGNOSIS — R73.03 PREDIABETES: Primary | ICD-10-CM

## 2024-06-25 PROBLEM — G47.30 SLEEP APNEA, UNSPECIFIED: Status: ACTIVE | Noted: 2023-02-22

## 2024-06-25 PROBLEM — I51.7 LVH (LEFT VENTRICULAR HYPERTROPHY): Status: ACTIVE | Noted: 2023-08-07

## 2024-06-25 PROBLEM — R93.1 ABNORMAL ECHOCARDIOGRAM: Status: ACTIVE | Noted: 2023-08-07

## 2024-06-25 PROBLEM — R60.0 BILATERAL LEG EDEMA: Status: ACTIVE | Noted: 2023-08-07

## 2024-06-25 PROBLEM — I34.0 NONRHEUMATIC MITRAL VALVE REGURGITATION: Status: ACTIVE | Noted: 2023-08-07

## 2024-06-25 PROBLEM — I77.810 ASCENDING AORTA DILATATION (HCC): Status: ACTIVE | Noted: 2023-08-07

## 2024-06-25 PROBLEM — I35.1 NONRHEUMATIC AORTIC VALVE INSUFFICIENCY: Status: ACTIVE | Noted: 2023-08-07

## 2024-06-25 LAB — HBA1C MFR BLD: 5.4 %

## 2024-06-25 PROCEDURE — 90715 TDAP VACCINE 7 YRS/> IM: CPT | Performed by: REGISTERED NURSE

## 2024-06-25 PROCEDURE — 90471 IMMUNIZATION ADMIN: CPT | Performed by: REGISTERED NURSE

## 2024-06-25 PROCEDURE — 83036 HEMOGLOBIN GLYCOSYLATED A1C: CPT | Performed by: REGISTERED NURSE

## 2024-06-25 PROCEDURE — 3079F DIAST BP 80-89 MM HG: CPT | Performed by: REGISTERED NURSE

## 2024-06-25 PROCEDURE — 3077F SYST BP >= 140 MM HG: CPT | Performed by: REGISTERED NURSE

## 2024-06-25 PROCEDURE — 99214 OFFICE O/P EST MOD 30 MIN: CPT | Performed by: REGISTERED NURSE

## 2024-06-25 RX ORDER — AMOXICILLIN AND CLAVULANATE POTASSIUM 875; 125 MG/1; MG/1
1 TABLET, FILM COATED ORAL 2 TIMES DAILY
Qty: 14 TABLET | Refills: 0 | Status: SHIPPED | OUTPATIENT
Start: 2024-06-25 | End: 2024-07-02

## 2024-06-25 ASSESSMENT — ANXIETY QUESTIONNAIRES
2. NOT BEING ABLE TO STOP OR CONTROL WORRYING: NOT AT ALL
5. BEING SO RESTLESS THAT IT IS HARD TO SIT STILL: NOT AT ALL
3. WORRYING TOO MUCH ABOUT DIFFERENT THINGS: SEVERAL DAYS
GAD7 TOTAL SCORE: 3
1. FEELING NERVOUS, ANXIOUS, OR ON EDGE: SEVERAL DAYS
7. FEELING AFRAID AS IF SOMETHING AWFUL MIGHT HAPPEN: NOT AT ALL
6. BECOMING EASILY ANNOYED OR IRRITABLE: SEVERAL DAYS
IF YOU CHECKED OFF ANY PROBLEMS ON THIS QUESTIONNAIRE, HOW DIFFICULT HAVE THESE PROBLEMS MADE IT FOR YOU TO DO YOUR WORK, TAKE CARE OF THINGS AT HOME, OR GET ALONG WITH OTHER PEOPLE: NOT DIFFICULT AT ALL
4. TROUBLE RELAXING: NOT AT ALL

## 2024-06-25 ASSESSMENT — PATIENT HEALTH QUESTIONNAIRE - PHQ9
SUM OF ALL RESPONSES TO PHQ9 QUESTIONS 1 & 2: 2
DEPRESSION UNABLE TO ASSESS: URGENT/EMERGENT SITUATION
9. THOUGHTS THAT YOU WOULD BE BETTER OFF DEAD, OR OF HURTING YOURSELF: NOT AT ALL
1. LITTLE INTEREST OR PLEASURE IN DOING THINGS: SEVERAL DAYS
SUM OF ALL RESPONSES TO PHQ QUESTIONS 1-9: 4
SUM OF ALL RESPONSES TO PHQ QUESTIONS 1-9: 4
10. IF YOU CHECKED OFF ANY PROBLEMS, HOW DIFFICULT HAVE THESE PROBLEMS MADE IT FOR YOU TO DO YOUR WORK, TAKE CARE OF THINGS AT HOME, OR GET ALONG WITH OTHER PEOPLE: NOT DIFFICULT AT ALL
SUM OF ALL RESPONSES TO PHQ QUESTIONS 1-9: 4
3. TROUBLE FALLING OR STAYING ASLEEP: NOT AT ALL
4. FEELING TIRED OR HAVING LITTLE ENERGY: SEVERAL DAYS
5. POOR APPETITE OR OVEREATING: NOT AT ALL
SUM OF ALL RESPONSES TO PHQ QUESTIONS 1-9: 4
2. FEELING DOWN, DEPRESSED OR HOPELESS: SEVERAL DAYS
6. FEELING BAD ABOUT YOURSELF - OR THAT YOU ARE A FAILURE OR HAVE LET YOURSELF OR YOUR FAMILY DOWN: NOT AT ALL
7. TROUBLE CONCENTRATING ON THINGS, SUCH AS READING THE NEWSPAPER OR WATCHING TELEVISION: SEVERAL DAYS
8. MOVING OR SPEAKING SO SLOWLY THAT OTHER PEOPLE COULD HAVE NOTICED. OR THE OPPOSITE, BEING SO FIGETY OR RESTLESS THAT YOU HAVE BEEN MOVING AROUND A LOT MORE THAN USUAL: NOT AT ALL

## 2024-06-25 NOTE — PROGRESS NOTES
Housing in the Last Year: No       MEDICATIONS:      Current Outpatient Medications   Medication Sig Dispense Refill    amoxicillin-clavulanate (AUGMENTIN) 875-125 MG per tablet Take 1 tablet by mouth 2 times daily for 7 days 14 tablet 0    metFORMIN (GLUCOPHAGE) 500 MG tablet Take 1 tablet by mouth 2 times daily (with meals) 180 tablet 1    esomeprazole (NEXIUM) 20 MG delayed release capsule take 1 capsule by mouth every morning before breakfast 90 capsule 3    losartan (COZAAR) 100 MG tablet take 1 tablet by mouth once daily 30 tablet 0    montelukast (SINGULAIR) 10 MG tablet take 1 tablet by mouth nightly 30 tablet 5    atorvastatin (LIPITOR) 80 MG tablet take 1 tablet by mouth once daily 90 tablet 1    DULoxetine (CYMBALTA) 60 MG extended release capsule take 1 capsule by mouth once daily 90 capsule 1    carvedilol (COREG) 12.5 MG tablet Take 1 tablet by mouth 2 times daily      furosemide (LASIX) 40 MG tablet Take 1 tablet by mouth daily      ibuprofen (ADVIL;MOTRIN) 800 MG tablet Take 1 tablet by mouth 2 times daily as needed for Pain 120 tablet 2    cetirizine (ZYRTEC) 10 MG tablet Take 1 tablet by mouth daily       No current facility-administered medications for this visit.       ALLERGIES:      No Known Allergies    RESULTS:      Results for POC orders placed in visit on 06/25/24   POCT glycosylated hemoglobin (Hb A1C)   Result Value Ref Range    Hemoglobin A1C 5.4 %      Office Visit on 06/25/2024   Component Date Value Ref Range Status    Hemoglobin A1C 06/25/2024 5.4  % Final   Hospital Outpatient Visit on 06/20/2024   Component Date Value Ref Range Status    PSA 06/20/2024 0.50  0.00 - 4.00 ng/mL Final    Comment: The Roche \"ECLIA\" assay is used.  Results obtained with different assay methods cannot be   used interchangeably.      TSH 06/20/2024 1.61  0.27 - 4.20 uIU/mL Final    Vit D, 25-Hydroxy 06/20/2024 23.5 (L)  30.0 - 100.0 ng/mL Final    Comment:    Reference Range:  Vitamin D status

## 2024-07-02 ENCOUNTER — NURSE ONLY (OUTPATIENT)
Dept: FAMILY MEDICINE CLINIC | Age: 59
End: 2024-07-02

## 2024-07-02 VITALS — DIASTOLIC BLOOD PRESSURE: 84 MMHG | HEART RATE: 90 BPM | OXYGEN SATURATION: 97 % | SYSTOLIC BLOOD PRESSURE: 136 MMHG

## 2024-07-02 NOTE — PROGRESS NOTES
Patient here today for a blood pressure check since last appointment with PCP.    Patient denies any complaints of symptoms such as headache, blurred vision, nausea, lightheadedness.      Patient is currently taking medication. The patient took the medication 1030 am.     The patients blood pressure today is 136/84  and the pulse is 90.     Alan consulted and notified of blood pressure and no symptoms. Alan advised pt to cont medication and follow up at next appt.      Writer discussed with patient physician’s plan.  Patient verbalized understanding.  Patient to follow up with Alan on 1/2/25.

## 2024-07-24 DIAGNOSIS — I10 ESSENTIAL HYPERTENSION: ICD-10-CM

## 2024-07-24 RX ORDER — LOSARTAN POTASSIUM 100 MG/1
100 TABLET ORAL DAILY
Qty: 30 TABLET | Refills: 0 | Status: SHIPPED | OUTPATIENT
Start: 2024-07-24

## 2024-07-24 NOTE — TELEPHONE ENCOUNTER
LOV 6/25/24   RTO 1/2/25  LRF 6/15/24          Controlled Substance Monitoring:    Acute and Chronic Pain Monitoring:   RX Monitoring Attestation Periodic Controlled Substance Monitoring   2/2/2019  10:31 AM The Prescription Monitoring Report for this patient was reviewed today. No signs of potential drug abuse or diversion identified.

## 2024-08-11 DIAGNOSIS — M51.26 LUMBAGO DUE TO DISPLACEMENT OF INTERVERTEBRAL DISC: ICD-10-CM

## 2024-08-11 DIAGNOSIS — F33.0 MAJOR DEPRESSIVE DISORDER, RECURRENT EPISODE, MILD (HCC): ICD-10-CM

## 2024-08-12 RX ORDER — DULOXETIN HYDROCHLORIDE 60 MG/1
60 CAPSULE, DELAYED RELEASE ORAL DAILY
Qty: 90 CAPSULE | Refills: 1 | Status: SHIPPED | OUTPATIENT
Start: 2024-08-12

## 2024-08-12 NOTE — TELEPHONE ENCOUNTER
LOV 6/25/24   RTO 1/2/25  LRF 4/29/24          Controlled Substance Monitoring:    Acute and Chronic Pain Monitoring:   RX Monitoring Attestation Periodic Controlled Substance Monitoring   2/2/2019  10:31 AM The Prescription Monitoring Report for this patient was reviewed today. No signs of potential drug abuse or diversion identified.

## 2024-08-21 DIAGNOSIS — I10 ESSENTIAL HYPERTENSION: ICD-10-CM

## 2024-08-22 RX ORDER — LOSARTAN POTASSIUM 100 MG/1
100 TABLET ORAL DAILY
Qty: 30 TABLET | Refills: 0 | Status: SHIPPED | OUTPATIENT
Start: 2024-08-22

## 2024-08-22 NOTE — TELEPHONE ENCOUNTER
Facundo Varela is calling to request a refill on the following medication(s):    Last Visit Date (If Applicable):  6/25/2024    Next Visit Date:    1/2/2025    Medication Request:  Requested Prescriptions     Pending Prescriptions Disp Refills    losartan (COZAAR) 100 MG tablet [Pharmacy Med Name: LOSARTAN 100MG TABLETS] 30 tablet 0     Sig: TAKE 1 TABLET BY MOUTH DAILY

## 2024-08-23 DIAGNOSIS — I10 ESSENTIAL HYPERTENSION: ICD-10-CM

## 2024-08-23 RX ORDER — LOSARTAN POTASSIUM 100 MG/1
100 TABLET ORAL DAILY
Qty: 30 TABLET | Refills: 0 | OUTPATIENT
Start: 2024-08-23

## 2024-09-25 RX ORDER — IBUPROFEN 800 MG/1
800 TABLET, FILM COATED ORAL 2 TIMES DAILY
Qty: 120 TABLET | Refills: 2 | OUTPATIENT
Start: 2024-09-25

## 2024-09-25 NOTE — TELEPHONE ENCOUNTER
LOV 6/25/24   RTO 1/2/25  LRF 11/28/23          Controlled Substance Monitoring:    Acute and Chronic Pain Monitoring:   RX Monitoring Attestation Periodic Controlled Substance Monitoring   2/2/2019  10:31 AM The Prescription Monitoring Report for this patient was reviewed today. No signs of potential drug abuse or diversion identified.

## 2024-09-27 DIAGNOSIS — I10 ESSENTIAL HYPERTENSION: ICD-10-CM

## 2024-09-27 DIAGNOSIS — K21.9 GASTROESOPHAGEAL REFLUX DISEASE WITHOUT ESOPHAGITIS: ICD-10-CM

## 2024-09-29 RX ORDER — LOSARTAN POTASSIUM 100 MG/1
100 TABLET ORAL DAILY
Qty: 30 TABLET | Refills: 0 | Status: SHIPPED | OUTPATIENT
Start: 2024-09-29

## 2024-10-02 DIAGNOSIS — J30.2 SEASONAL ALLERGIC RHINITIS, UNSPECIFIED TRIGGER: ICD-10-CM

## 2024-10-03 NOTE — TELEPHONE ENCOUNTER
LOV: 6/25/2024    RTO:   Future Appointments   Date Time Provider Department Center   1/2/2025  2:00 PM Alan Hartley APRN - CNP [place] [facility] DEP     LRF: 11/28/23          Controlled Substance Monitoring:    Acute and Chronic Pain Monitoring:   RX Monitoring Attestation Periodic Controlled Substance Monitoring   2/2/2019  10:31 AM The Prescription Monitoring Report for this patient was reviewed today. No signs of potential drug abuse or diversion identified.

## 2024-10-06 RX ORDER — IBUPROFEN 800 MG/1
800 TABLET, FILM COATED ORAL 2 TIMES DAILY PRN
Qty: 120 TABLET | Refills: 2 | Status: SHIPPED | OUTPATIENT
Start: 2024-10-06

## 2024-10-06 RX ORDER — MONTELUKAST SODIUM 10 MG/1
10 TABLET ORAL NIGHTLY
Qty: 30 TABLET | Refills: 5 | Status: SHIPPED | OUTPATIENT
Start: 2024-10-06

## 2024-10-16 DIAGNOSIS — J30.2 SEASONAL ALLERGIC RHINITIS, UNSPECIFIED TRIGGER: ICD-10-CM

## 2024-10-17 RX ORDER — MONTELUKAST SODIUM 10 MG/1
10 TABLET ORAL NIGHTLY
Qty: 30 TABLET | Refills: 5 | OUTPATIENT
Start: 2024-10-17

## 2024-10-21 DIAGNOSIS — I10 ESSENTIAL HYPERTENSION: ICD-10-CM

## 2024-10-21 RX ORDER — LOSARTAN POTASSIUM 100 MG/1
100 TABLET ORAL DAILY
Qty: 90 TABLET | Refills: 1 | Status: SHIPPED | OUTPATIENT
Start: 2024-10-21

## 2024-10-21 NOTE — TELEPHONE ENCOUNTER
LOV: 6/25/2024    RTO:   Future Appointments   Date Time Provider Department Center   1/2/2025  2:00 PM Alan Hartley APRN - CNP [place] [facility] DEP     LRF: 9/29/24          Controlled Substance Monitoring:    Acute and Chronic Pain Monitoring:   RX Monitoring Attestation Periodic Controlled Substance Monitoring   2/2/2019  10:31 AM The Prescription Monitoring Report for this patient was reviewed today. No signs of potential drug abuse or diversion identified.

## 2024-12-20 DIAGNOSIS — E78.00 PURE HYPERCHOLESTEROLEMIA: ICD-10-CM

## 2024-12-20 DIAGNOSIS — R73.01 IMPAIRED FASTING BLOOD SUGAR: ICD-10-CM

## 2024-12-20 RX ORDER — ATORVASTATIN CALCIUM 80 MG/1
TABLET, FILM COATED ORAL
Qty: 90 TABLET | Refills: 1 | Status: SHIPPED | OUTPATIENT
Start: 2024-12-20

## 2024-12-20 NOTE — TELEPHONE ENCOUNTER
LOV 6/25/24  LRF 6/17/24 Metformin, Atorvastatin 5/13/24    Health Maintenance   Topic Date Due    Annual Wellness Visit (Medicare)  01/12/2024    Flu vaccine (1) 08/01/2024    COVID-19 Vaccine (5 - 2023-24 season) 09/01/2024    Shingles vaccine (1 of 2) 06/25/2025 (Originally 11/6/2013)    Lipids  06/20/2025    A1C test (Diabetic or Prediabetic)  06/25/2025    Depression Monitoring  06/25/2025    Colorectal Cancer Screen  12/01/2026    DTaP/Tdap/Td vaccine (3 - Td or Tdap) 06/25/2034    Respiratory Syncytial Virus (RSV) Pregnant or age 60 yrs+ (1 - 1-dose 75+ series) 11/06/2038    Hepatitis C screen  Completed    HIV screen  Completed    Hepatitis A vaccine  Aged Out    Hepatitis B vaccine  Aged Out    Hib vaccine  Aged Out    Polio vaccine  Aged Out    Meningococcal (ACWY) vaccine  Aged Out    Pneumococcal 0-64 years Vaccine  Aged Out    Diabetic Alb to Cr ratio (uACR) test  Discontinued    GFR test (Diabetes, CKD 3-4, OR last GFR 15-59)  Discontinued    Prostate Specific Antigen (PSA) Screening or Monitoring  Discontinued             (applicable per patient's age: Cancer Screenings, Depression Screening, Fall Risk Screening, Immunizations)    Hemoglobin A1C (%)   Date Value   06/25/2024 5.4   11/10/2022 6.0   10/27/2021 5.6     AST (U/L)   Date Value   12/07/2022 25     ALT (U/L)   Date Value   12/07/2022 23     BUN (mg/dL)   Date Value   04/19/2023 16      (goal A1C is < 7)   (goal LDL is <100) need 30-50% reduction from baseline     BP Readings from Last 3 Encounters:   07/02/24 136/84   06/25/24 (!) 146/86   11/28/23 134/88    (goal /80)      All Future Testing planned in [facility]:  Lab Frequency Next Occurrence       Next Visit Date:  Future Appointments   Date Time Provider Department Center   1/2/2025  2:00 PM Alan Hartley APRN - CNP [facility] DEP            Patient Active Problem List:     Hyperlipidemia     Essential hypertension     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

## 2025-01-23 ENCOUNTER — OFFICE VISIT (OUTPATIENT)
Dept: FAMILY MEDICINE CLINIC | Age: 60
End: 2025-01-23

## 2025-01-23 VITALS
HEIGHT: 76 IN | BODY MASS INDEX: 33.24 KG/M2 | SYSTOLIC BLOOD PRESSURE: 120 MMHG | DIASTOLIC BLOOD PRESSURE: 78 MMHG | TEMPERATURE: 97.6 F | HEART RATE: 61 BPM | WEIGHT: 273 LBS | OXYGEN SATURATION: 99 %

## 2025-01-23 DIAGNOSIS — Z23 NEED FOR INFLUENZA VACCINATION: Primary | ICD-10-CM

## 2025-01-23 DIAGNOSIS — Z13.220 SCREENING FOR LIPID DISORDERS: ICD-10-CM

## 2025-01-23 DIAGNOSIS — I34.0 NONRHEUMATIC MITRAL VALVE REGURGITATION: ICD-10-CM

## 2025-01-23 DIAGNOSIS — E11.9 CONTROLLED TYPE 2 DIABETES MELLITUS WITHOUT COMPLICATION, WITHOUT LONG-TERM CURRENT USE OF INSULIN (HCC): ICD-10-CM

## 2025-01-23 DIAGNOSIS — Z00.00 MEDICARE ANNUAL WELLNESS VISIT, SUBSEQUENT: ICD-10-CM

## 2025-01-23 PROBLEM — R42 DIZZINESS: Status: ACTIVE | Noted: 2024-10-17

## 2025-01-23 PROBLEM — E87.1 HYPONATREMIA: Status: ACTIVE | Noted: 2024-10-17

## 2025-01-23 PROBLEM — M48.061 SPINAL STENOSIS OF LUMBAR REGION: Status: ACTIVE | Noted: 2017-09-28

## 2025-01-23 RX ORDER — FAMOTIDINE 20 MG/1
20 TABLET, FILM COATED ORAL 2 TIMES DAILY
COMMUNITY

## 2025-01-23 RX ORDER — METOPROLOL TARTRATE 50 MG
50 TABLET ORAL 2 TIMES DAILY
COMMUNITY
Start: 2024-11-12

## 2025-01-23 SDOH — ECONOMIC STABILITY: FOOD INSECURITY: WITHIN THE PAST 12 MONTHS, THE FOOD YOU BOUGHT JUST DIDN'T LAST AND YOU DIDN'T HAVE MONEY TO GET MORE.: NEVER TRUE

## 2025-01-23 SDOH — ECONOMIC STABILITY: FOOD INSECURITY: WITHIN THE PAST 12 MONTHS, YOU WORRIED THAT YOUR FOOD WOULD RUN OUT BEFORE YOU GOT MONEY TO BUY MORE.: NEVER TRUE

## 2025-01-23 ASSESSMENT — PATIENT HEALTH QUESTIONNAIRE - PHQ9
SUM OF ALL RESPONSES TO PHQ QUESTIONS 1-9: 5
6. FEELING BAD ABOUT YOURSELF - OR THAT YOU ARE A FAILURE OR HAVE LET YOURSELF OR YOUR FAMILY DOWN: SEVERAL DAYS
SUM OF ALL RESPONSES TO PHQ QUESTIONS 1-9: 5
9. THOUGHTS THAT YOU WOULD BE BETTER OFF DEAD, OR OF HURTING YOURSELF: NOT AT ALL
10. IF YOU CHECKED OFF ANY PROBLEMS, HOW DIFFICULT HAVE THESE PROBLEMS MADE IT FOR YOU TO DO YOUR WORK, TAKE CARE OF THINGS AT HOME, OR GET ALONG WITH OTHER PEOPLE: NOT DIFFICULT AT ALL
SUM OF ALL RESPONSES TO PHQ QUESTIONS 1-9: 5
8. MOVING OR SPEAKING SO SLOWLY THAT OTHER PEOPLE COULD HAVE NOTICED. OR THE OPPOSITE, BEING SO FIGETY OR RESTLESS THAT YOU HAVE BEEN MOVING AROUND A LOT MORE THAN USUAL: NOT AT ALL
2. FEELING DOWN, DEPRESSED OR HOPELESS: NOT AT ALL
3. TROUBLE FALLING OR STAYING ASLEEP: SEVERAL DAYS
SUM OF ALL RESPONSES TO PHQ QUESTIONS 1-9: 5
7. TROUBLE CONCENTRATING ON THINGS, SUCH AS READING THE NEWSPAPER OR WATCHING TELEVISION: SEVERAL DAYS
SUM OF ALL RESPONSES TO PHQ9 QUESTIONS 1 & 2: 1
1. LITTLE INTEREST OR PLEASURE IN DOING THINGS: SEVERAL DAYS
5. POOR APPETITE OR OVEREATING: NOT AT ALL
4. FEELING TIRED OR HAVING LITTLE ENERGY: SEVERAL DAYS

## 2025-01-23 ASSESSMENT — LIFESTYLE VARIABLES
HOW OFTEN DURING THE LAST YEAR HAVE YOU FOUND THAT YOU WERE NOT ABLE TO STOP DRINKING ONCE YOU HAD STARTED: NEVER
HAVE YOU OR SOMEONE ELSE BEEN INJURED AS A RESULT OF YOUR DRINKING: NO
HOW OFTEN DURING THE LAST YEAR HAVE YOU HAD A FEELING OF GUILT OR REMORSE AFTER DRINKING: NEVER
HOW MANY STANDARD DRINKS CONTAINING ALCOHOL DO YOU HAVE ON A TYPICAL DAY: 3 OR 4
HOW OFTEN DURING THE LAST YEAR HAVE YOU NEEDED AN ALCOHOLIC DRINK FIRST THING IN THE MORNING TO GET YOURSELF GOING AFTER A NIGHT OF HEAVY DRINKING: NEVER
HAS A RELATIVE, FRIEND, DOCTOR, OR ANOTHER HEALTH PROFESSIONAL EXPRESSED CONCERN ABOUT YOUR DRINKING OR SUGGESTED YOU CUT DOWN: NO
HOW OFTEN DO YOU HAVE A DRINK CONTAINING ALCOHOL: 2-3 TIMES A WEEK
HOW OFTEN DURING THE LAST YEAR HAVE YOU FAILED TO DO WHAT WAS NORMALLY EXPECTED FROM YOU BECAUSE OF DRINKING: NEVER
HOW OFTEN DURING THE LAST YEAR HAVE YOU BEEN UNABLE TO REMEMBER WHAT HAPPENED THE NIGHT BEFORE BECAUSE YOU HAD BEEN DRINKING: NEVER

## 2025-01-23 NOTE — PATIENT INSTRUCTIONS
professional. [employer], disclaims any warranty or liability for your use of this information.         9 Ways to Cut Back on Drinking  Maybe you've found yourself drinking more alcohol than you'd prefer. If you want to cut back, here are some ideas to try.    Think before you drink.  Do you really want a drink, or is it just a habit? If you're used to having a drink at a certain time, try doing something else then.     Look for substitutes.  Find some no-alcohol drinks that you enjoy, like flavored seltzer water, tea with honey, or tonic with a slice of lime. Or try alcohol-free beer or \"virgin\" cocktails (without the alcohol).     Drink more water.  Use water to quench your thirst. Drink a glass of water before you have any alcohol. Have another glass along with every drink or between drinks.     Shrink your drink.  For example, have a bottle of beer instead of a pint. Use a smaller glass for wine. Choose drinks with lower alcohol content (ABV%). Or use less liquor and more mixer in cocktails.     Slow down.  It's easy to drink quickly and without thinking about it. Pay attention, and make each drink last longer.     Do the math.  Total up how much you spend on alcohol each month. How much is that a year? If you cut back, what could you do with the money you save?     Take a break.  Choose a day or two each week when you won't drink at all. Notice how you feel on those days, physically and emotionally. How did you sleep? Do you feel better? Over time, add more break days.     Count calories.  Would you like to lose some weight? For some people that's a good motivator for cutting back. Figure out how many calories are in each drink. How many does that add up to in a day? In a week? In a month?     Practice saying no.  Be ready when someone offers you a drink. Try: \"Thanks, I've had enough.\" Or \"Thanks, but I'm cutting back.\" Or \"No, thanks. I feel better when I drink less.\"   Current as of: November 15,

## 2025-01-23 NOTE — PROGRESS NOTES
includes ibuprofen, taken once daily in the morning, a practice he has maintained for approximately 3 to 4 years. He also takes metformin twice daily, once in the morning and once at night.    He acknowledges excessive alcohol consumption, averaging 3 to 4 mixed drinks three times a week, and expresses a desire to reduce this intake. He does not use marijuana or smoke.    He admits to a lack of physical activity, citing cold weather as a deterrent, but recognizes the need for increased exercise. He has been attempting weight loss but finds it challenging. He has no history of myocardial infarction.    SOCIAL HISTORY  The patient admits to drinking alcohol 3 days a week, consuming 3-4 mixed drinks on those days. The patient does not eat gummies or smoke.    MEDICATIONS  Current: Cymbalta, ibuprofen, metformin, Lasix  Discontinued: carvedilol    IMMUNIZATIONS  The patient received a tetanus shot during the summer.      Patient's complete Health Risk Assessment and screening values have been reviewed and are found in Flowsheets. The following problems were reviewed today and where indicated follow up appointments were made and/or referrals ordered.    Positive Risk Factor Screenings with Interventions:      Depression:  PHQ-2 Score: 1  PHQ-9 Total Score: 5  Total Score Interpretation: 5-9 = mild depression  Interventions:  Patient advised to follow-up in this office for further evaluation and treatment  Continue with Cymbalta  See AVS for additional education material    Alcohol Screening:  AUDIT-C Score: 5  AUDIT Total Score: 5  Total Score Interpretation: 0-7 suggests low risk alcohol consumption but assess individual risks   Interventions:  Patient comments: three days a week, three to four mixed drinks.   Alcohol consumption guidelines reviewed. Moderation recommended              Inactivity:  On average, how many days per week do you engage in moderate to strenuous exercise (like a brisk walk)?: 1 day (!)

## 2025-01-24 DIAGNOSIS — J30.2 SEASONAL ALLERGIC RHINITIS, UNSPECIFIED TRIGGER: ICD-10-CM

## 2025-01-24 RX ORDER — MONTELUKAST SODIUM 10 MG/1
10 TABLET ORAL NIGHTLY
Qty: 90 TABLET | OUTPATIENT
Start: 2025-01-24

## 2025-03-22 DIAGNOSIS — J30.2 SEASONAL ALLERGIC RHINITIS, UNSPECIFIED TRIGGER: ICD-10-CM

## 2025-03-24 RX ORDER — MONTELUKAST SODIUM 10 MG/1
10 TABLET ORAL NIGHTLY
Qty: 30 TABLET | Refills: 5 | Status: SHIPPED | OUTPATIENT
Start: 2025-03-24

## 2025-03-24 NOTE — TELEPHONE ENCOUNTER
LOV: 1/23/2025    RTO:   Future Appointments   Date Time Provider Department Center   7/23/2025  1:30 PM Alan Hartley APRN - CNP [place] [facility] DEP     LRF: 10/6/2024          Controlled Substance Monitoring:    Acute and Chronic Pain Monitoring:   RX Monitoring Attestation Periodic Controlled Substance Monitoring   2/2/2019  10:31 AM The Prescription Monitoring Report for this patient was reviewed today. No signs of potential drug abuse or diversion identified.

## 2025-03-25 DIAGNOSIS — R73.01 IMPAIRED FASTING BLOOD SUGAR: ICD-10-CM

## 2025-04-15 DIAGNOSIS — F33.0 MAJOR DEPRESSIVE DISORDER, RECURRENT EPISODE, MILD: ICD-10-CM

## 2025-04-15 DIAGNOSIS — I10 ESSENTIAL HYPERTENSION: ICD-10-CM

## 2025-04-15 DIAGNOSIS — M51.26 LUMBAGO DUE TO DISPLACEMENT OF INTERVERTEBRAL DISC: ICD-10-CM

## 2025-04-15 RX ORDER — DULOXETIN HYDROCHLORIDE 60 MG/1
60 CAPSULE, DELAYED RELEASE ORAL DAILY
Qty: 90 CAPSULE | Refills: 1 | Status: SHIPPED | OUTPATIENT
Start: 2025-04-15

## 2025-04-15 RX ORDER — LOSARTAN POTASSIUM 100 MG/1
100 TABLET ORAL DAILY
Qty: 90 TABLET | Refills: 1 | Status: SHIPPED | OUTPATIENT
Start: 2025-04-15

## 2025-04-15 NOTE — TELEPHONE ENCOUNTER
LOV: 1/23/2025    RTO:   Future Appointments   Date Time Provider Department Center   7/23/2025  1:40 PM Alan Hartley APRN - CNP [place] [facility] DEP     LRF: 8/12/2024          Controlled Substance Monitoring:    Acute and Chronic Pain Monitoring:   RX Monitoring Attestation Periodic Controlled Substance Monitoring   2/2/2019  10:31 AM The Prescription Monitoring Report for this patient was reviewed today. No signs of potential drug abuse or diversion identified.

## 2025-05-21 RX ORDER — IBUPROFEN 800 MG/1
800 TABLET, FILM COATED ORAL 2 TIMES DAILY PRN
Qty: 120 TABLET | Refills: 2 | OUTPATIENT
Start: 2025-05-21

## 2025-06-20 DIAGNOSIS — E78.00 PURE HYPERCHOLESTEROLEMIA: ICD-10-CM

## 2025-06-20 DIAGNOSIS — R73.01 IMPAIRED FASTING BLOOD SUGAR: ICD-10-CM

## 2025-06-20 NOTE — TELEPHONE ENCOUNTER
LOV 1/23/25  LRF 12/20/24 Both Medications    Health Maintenance   Topic Date Due    Diabetic foot exam  Never done    Diabetic retinal exam  Never done    Diabetic Alb to Cr ratio (uACR) test  01/11/2024    GFR test (Diabetes, CKD 3-4, OR last GFR 15-59)  04/19/2024    Lipids  06/20/2025    A1C test (Diabetic or Prediabetic)  06/25/2025    Shingles vaccine (1 of 2) 06/25/2025 (Originally 11/6/2013)    COVID-19 Vaccine (5 - 2024-25 season) 01/23/2026 (Originally 9/1/2024)    Depression Monitoring  01/23/2026    Annual Wellness Visit (Medicare)  01/24/2026    Colorectal Cancer Screen  12/01/2026    DTaP/Tdap/Td vaccine (3 - Td or Tdap) 06/25/2034    Respiratory Syncytial Virus (RSV) Pregnant or age 60 yrs+ (1 - 1-dose 75+ series) 11/06/2038    Flu vaccine  Completed    Pneumococcal 50+ years Vaccine  Completed    Hepatitis C screen  Completed    HIV screen  Completed    Hepatitis A vaccine  Aged Out    Hepatitis B vaccine  Aged Out    Hib vaccine  Aged Out    Polio vaccine  Aged Out    Meningococcal (ACWY) vaccine  Aged Out    Meningococcal B vaccine  Aged Out    Pneumococcal 0-49 years Vaccine  Discontinued    Prostate Specific Antigen (PSA) Screening or Monitoring  Discontinued             (applicable per patient's age: Cancer Screenings, Depression Screening, Fall Risk Screening, Immunizations)    Hemoglobin A1C (%)   Date Value   06/25/2024 5.4   11/10/2022 6.0   10/27/2021 5.6     AST (U/L)   Date Value   12/07/2022 25     ALT (U/L)   Date Value   12/07/2022 23     BUN (mg/dL)   Date Value   04/19/2023 16      (goal A1C is < 7)   (goal LDL is <100) need 30-50% reduction from baseline     BP Readings from Last 3 Encounters:   01/23/25 120/78   07/02/24 136/84   06/25/24 (!) 146/86    (goal /80)      All Future Testing planned in [facility]:  Lab Frequency Next Occurrence   Albumin/Creatinine Ratio, Urine Once 01/23/2025   Comprehensive Metabolic Panel Once 01/23/2025   Hemoglobin A1C Once 01/23/2025   Lipid

## 2025-06-22 RX ORDER — ATORVASTATIN CALCIUM 80 MG/1
80 TABLET, FILM COATED ORAL DAILY
Qty: 90 TABLET | Refills: 1 | Status: SHIPPED | OUTPATIENT
Start: 2025-06-22

## 2025-06-24 DIAGNOSIS — R73.01 IMPAIRED FASTING BLOOD SUGAR: ICD-10-CM

## 2025-06-24 DIAGNOSIS — K21.9 GASTROESOPHAGEAL REFLUX DISEASE WITHOUT ESOPHAGITIS: ICD-10-CM

## 2025-06-24 RX ORDER — IBUPROFEN 800 MG/1
800 TABLET, FILM COATED ORAL 2 TIMES DAILY PRN
Qty: 120 TABLET | Refills: 2 | Status: SHIPPED | OUTPATIENT
Start: 2025-06-24

## 2025-07-18 ENCOUNTER — HOSPITAL ENCOUNTER (OUTPATIENT)
Age: 60
Setting detail: SPECIMEN
Discharge: HOME | End: 2025-07-18

## 2025-07-18 DIAGNOSIS — Z13.220 SCREENING FOR LIPID DISORDERS: ICD-10-CM

## 2025-07-18 DIAGNOSIS — E11.9 CONTROLLED TYPE 2 DIABETES MELLITUS WITHOUT COMPLICATION, WITHOUT LONG-TERM CURRENT USE OF INSULIN (HCC): ICD-10-CM

## 2025-07-18 LAB
ALBUMIN SERPL-MCNC: 3.7 G/DL (ref 3.5–5.2)
ALBUMIN/GLOB SERPL: 0.9 {RATIO} (ref 1–2.5)
ALP SERPL-CCNC: 91 U/L (ref 40–129)
ALT SERPL-CCNC: 17 U/L (ref 10–50)
ANION GAP SERPL CALCULATED.3IONS-SCNC: 17 MMOL/L (ref 9–16)
AST SERPL-CCNC: 21 U/L (ref 10–50)
BILIRUB SERPL-MCNC: 0.3 MG/DL (ref 0–1.2)
BUN SERPL-MCNC: 14 MG/DL (ref 8–23)
CALCIUM SERPL-MCNC: 10 MG/DL (ref 8.6–10.4)
CHLORIDE SERPL-SCNC: 97 MMOL/L (ref 98–107)
CHOLEST SERPL-MCNC: 172 MG/DL (ref 0–199)
CHOLESTEROL/HDL RATIO: 2.4
CO2 SERPL-SCNC: 21 MMOL/L (ref 20–31)
CREAT SERPL-MCNC: 1.4 MG/DL (ref 0.7–1.2)
CREAT UR-MCNC: 131 MG/DL (ref 39–259)
EST. AVERAGE GLUCOSE BLD GHB EST-MCNC: 114 MG/DL
GFR, ESTIMATED: 57 ML/MIN/1.73M2
GLUCOSE SERPL-MCNC: 71 MG/DL (ref 74–99)
HBA1C MFR BLD: 5.6 % (ref 4–6)
HDLC SERPL-MCNC: 72 MG/DL
LDLC SERPL CALC-MCNC: 53 MG/DL (ref 0–100)
MICROALBUMIN UR-MCNC: 20 MG/L (ref 0–20)
MICROALBUMIN/CREAT UR-RTO: 15 MCG/MG CREAT (ref 0–17)
POTASSIUM SERPL-SCNC: 4.4 MMOL/L (ref 3.7–5.3)
PROT SERPL-MCNC: 7.8 G/DL (ref 6.6–8.7)
SODIUM SERPL-SCNC: 135 MMOL/L (ref 136–145)
TRIGL SERPL-MCNC: 233 MG/DL
VLDLC SERPL CALC-MCNC: 47 MG/DL (ref 1–30)

## 2025-07-23 ENCOUNTER — OFFICE VISIT (OUTPATIENT)
Dept: FAMILY MEDICINE CLINIC | Age: 60
End: 2025-07-23
Payer: MEDICARE

## 2025-07-23 VITALS
TEMPERATURE: 98.2 F | BODY MASS INDEX: 32.58 KG/M2 | DIASTOLIC BLOOD PRESSURE: 68 MMHG | WEIGHT: 262 LBS | OXYGEN SATURATION: 96 % | SYSTOLIC BLOOD PRESSURE: 124 MMHG | HEIGHT: 75 IN | HEART RATE: 80 BPM

## 2025-07-23 DIAGNOSIS — I10 ESSENTIAL HYPERTENSION: Primary | ICD-10-CM

## 2025-07-23 DIAGNOSIS — E11.9 CONTROLLED TYPE 2 DIABETES MELLITUS WITHOUT COMPLICATION, WITHOUT LONG-TERM CURRENT USE OF INSULIN (HCC): ICD-10-CM

## 2025-07-23 DIAGNOSIS — G47.00 INSOMNIA, UNSPECIFIED TYPE: ICD-10-CM

## 2025-07-23 PROCEDURE — 1036F TOBACCO NON-USER: CPT | Performed by: REGISTERED NURSE

## 2025-07-23 PROCEDURE — G8427 DOCREV CUR MEDS BY ELIG CLIN: HCPCS | Performed by: REGISTERED NURSE

## 2025-07-23 PROCEDURE — 3017F COLORECTAL CA SCREEN DOC REV: CPT | Performed by: REGISTERED NURSE

## 2025-07-23 PROCEDURE — 99214 OFFICE O/P EST MOD 30 MIN: CPT | Performed by: REGISTERED NURSE

## 2025-07-23 PROCEDURE — 2022F DILAT RTA XM EVC RTNOPTHY: CPT | Performed by: REGISTERED NURSE

## 2025-07-23 PROCEDURE — G8417 CALC BMI ABV UP PARAM F/U: HCPCS | Performed by: REGISTERED NURSE

## 2025-07-23 PROCEDURE — 3078F DIAST BP <80 MM HG: CPT | Performed by: REGISTERED NURSE

## 2025-07-23 PROCEDURE — 3044F HG A1C LEVEL LT 7.0%: CPT | Performed by: REGISTERED NURSE

## 2025-07-23 PROCEDURE — 3074F SYST BP LT 130 MM HG: CPT | Performed by: REGISTERED NURSE

## 2025-07-23 RX ORDER — FUROSEMIDE 20 MG/1
20 TABLET ORAL DAILY
COMMUNITY
Start: 2025-05-09

## 2025-07-23 RX ORDER — AMLODIPINE BESYLATE 5 MG/1
5 TABLET ORAL DAILY
COMMUNITY
Start: 2025-07-15

## 2025-07-23 RX ORDER — TRAZODONE HYDROCHLORIDE 50 MG/1
50 TABLET ORAL NIGHTLY
Qty: 90 TABLET | Refills: 1 | Status: SHIPPED | OUTPATIENT
Start: 2025-07-23

## 2025-07-23 ASSESSMENT — ENCOUNTER SYMPTOMS
DIARRHEA: 0
SORE THROAT: 0
CONSTIPATION: 0
COUGH: 0
RHINORRHEA: 0
SHORTNESS OF BREATH: 0

## 2025-07-23 NOTE — PROGRESS NOTES
Provider  Srini Carrillo MD as Consulting Physician (Infectious Diseases)  Rayshawn Luna MD as Consulting Physician (Pulmonary Disease)  Paula Callahan MD as Consulting Physician (Anesthesiology)  Nely Birmingham MD as Consulting Physician (Gastroenterology)        Review of Systems   Constitutional:  Negative for activity change, fatigue and unexpected weight change.   HENT:  Negative for congestion, ear pain, hearing loss, rhinorrhea and sore throat.    Respiratory:  Negative for cough and shortness of breath.    Cardiovascular:  Positive for leg swelling (left leg). Negative for chest pain and palpitations.   Gastrointestinal:  Negative for constipation and diarrhea. Nausea: does get feeling of dry heaving randomly.  Musculoskeletal:  Negative for arthralgias and gait problem.   Neurological:  Negative for dizziness, weakness and headaches.   Psychiatric/Behavioral:  Negative for confusion. The patient is not nervous/anxious.         PHYSICAL EXAM:      /68 (BP Site: Left Upper Arm, Patient Position: Sitting, BP Cuff Size: Medium Adult)   Pulse 80   Temp 98.2 °F (36.8 °C) (Tympanic)   Ht 1.905 m (6' 3\")   Wt 118.8 kg (262 lb)   SpO2 96%   BMI 32.75 kg/m²     Physical Exam  Vitals reviewed.   Constitutional:       Appearance: Normal appearance. He is obese. He is not ill-appearing.   Cardiovascular:      Rate and Rhythm: Normal rate and regular rhythm.      Pulses: Normal pulses.      Heart sounds: Normal heart sounds. No murmur heard.     No friction rub. No gallop.   Pulmonary:      Effort: Pulmonary effort is normal. No respiratory distress.      Breath sounds: Normal breath sounds. No wheezing.   Abdominal:      General: Bowel sounds are normal.      Palpations: Abdomen is soft.      Tenderness: There is no abdominal tenderness.   Musculoskeletal:         General: Normal range of motion.      Right lower leg: No edema.      Left lower leg: Edema present.   Skin:     General: Skin is

## 2025-08-01 ENCOUNTER — HOSPITAL ENCOUNTER (OUTPATIENT)
Age: 60
Setting detail: SPECIMEN
Discharge: HOME | End: 2025-08-01

## 2025-08-01 LAB
MAGNESIUM SERPL-MCNC: 1.5 MG/DL (ref 1.6–2.4)
POTASSIUM SERPL-SCNC: 5.2 MMOL/L (ref 3.7–5.3)

## 2025-08-19 DIAGNOSIS — J30.2 SEASONAL ALLERGIC RHINITIS, UNSPECIFIED TRIGGER: ICD-10-CM

## 2025-08-19 RX ORDER — MONTELUKAST SODIUM 10 MG/1
10 TABLET ORAL NIGHTLY
Qty: 90 TABLET | Refills: 1 | Status: SHIPPED | OUTPATIENT
Start: 2025-08-19

## (undated) DEVICE — TUBING, SUCTION, 3/16" X 10', STRAIGHT: Brand: MEDLINE

## (undated) DEVICE — SPONGE GZ W4XL4IN COT 4 PLY WVN

## (undated) DEVICE — CANNULA IV 18GA L15IN BLNT FILL LUERLOCK HUB MJCT

## (undated) DEVICE — JELLY,LUBE,STERILE,FLIP TOP,TUBE,2-OZ: Brand: MEDLINE

## (undated) DEVICE — SET VLV 3 PC AWS DISPOSABLE GRDIAN SCOPEVALET

## (undated) DEVICE — 4-PORT MANIFOLD: Brand: NEPTUNE 2

## (undated) DEVICE — SOLUTION IV IRRIG WATER 1000ML POUR BRL 2F7114

## (undated) DEVICE — BITEBLOCK 54FR W/ DENT RIM BLOX

## (undated) DEVICE — MEDI-VAC NON-CONDUCTIVE SUCTION TUBING 7MM X 6.1M (20 FT.) L: Brand: CARDINAL HEALTH

## (undated) DEVICE — SINGLE-USE BIOPSY FORCEPS: Brand: RADIAL JAW 4

## (undated) DEVICE — GAUZE,SPONGE,3"X3",4PLY,NS,LF: Brand: MEDLINE

## (undated) DEVICE — BLOCK BITE ENDOSCP AD 21 MM W/ DIL BLU LF DISP

## (undated) DEVICE — EMESIS BASIN: Brand: DEROYAL

## (undated) DEVICE — BRUSH PRESOAK CLEAN BACTERIOSTATIC DUAL

## (undated) DEVICE — FORCEPS REPROCESS BIOP RADL JAW 4 W/NDL

## (undated) DEVICE — FORCEPS BX L240CM JAW DIA2.8MM L CAP W/ NDL MIC MESH TOOTH

## (undated) DEVICE — GLOVE ORANGE PI 7 1/2   MSG9075

## (undated) DEVICE — 35 ML SYRINGE LUER-LOCK TIP: Brand: MONOJECT

## (undated) DEVICE — Device: Brand: SPOT ENDOSOPIC MARKER